# Patient Record
Sex: FEMALE | Race: WHITE | HISPANIC OR LATINO | Employment: FULL TIME | URBAN - METROPOLITAN AREA
[De-identification: names, ages, dates, MRNs, and addresses within clinical notes are randomized per-mention and may not be internally consistent; named-entity substitution may affect disease eponyms.]

---

## 2017-01-11 ENCOUNTER — ALLSCRIPTS OFFICE VISIT (OUTPATIENT)
Dept: OTHER | Facility: OTHER | Age: 57
End: 2017-01-11

## 2017-01-11 DIAGNOSIS — R10.9 ABDOMINAL PAIN: ICD-10-CM

## 2017-01-13 ENCOUNTER — TRANSCRIBE ORDERS (OUTPATIENT)
Dept: ADMINISTRATIVE | Facility: HOSPITAL | Age: 57
End: 2017-01-13

## 2017-01-13 DIAGNOSIS — R10.9 RIGHT FLANK PAIN: Primary | ICD-10-CM

## 2017-01-21 ENCOUNTER — ALLSCRIPTS OFFICE VISIT (OUTPATIENT)
Dept: OTHER | Facility: OTHER | Age: 57
End: 2017-01-21

## 2017-02-07 ENCOUNTER — HOSPITAL ENCOUNTER (OUTPATIENT)
Dept: CT IMAGING | Facility: HOSPITAL | Age: 57
Discharge: HOME | End: 2017-02-07
Payer: COMMERCIAL

## 2017-02-07 DIAGNOSIS — R10.9 ABDOMINAL PAIN: ICD-10-CM

## 2017-02-07 PROCEDURE — 74176 CT ABD & PELVIS W/O CONTRAST: CPT

## 2017-02-09 ENCOUNTER — GENERIC CONVERSION - ENCOUNTER (OUTPATIENT)
Dept: OTHER | Facility: OTHER | Age: 57
End: 2017-02-09

## 2017-04-03 ENCOUNTER — ALLSCRIPTS OFFICE VISIT (OUTPATIENT)
Dept: OTHER | Facility: OTHER | Age: 57
End: 2017-04-03

## 2017-05-16 ENCOUNTER — ALLSCRIPTS OFFICE VISIT (OUTPATIENT)
Dept: OTHER | Facility: OTHER | Age: 57
End: 2017-05-16

## 2017-05-16 DIAGNOSIS — E55.9 VITAMIN D DEFICIENCY: ICD-10-CM

## 2017-05-16 DIAGNOSIS — E03.9 HYPOTHYROIDISM: ICD-10-CM

## 2017-05-16 DIAGNOSIS — E78.5 HYPERLIPIDEMIA: ICD-10-CM

## 2017-05-16 DIAGNOSIS — F41.1 GENERALIZED ANXIETY DISORDER: ICD-10-CM

## 2017-05-16 DIAGNOSIS — Z12.31 ENCOUNTER FOR SCREENING MAMMOGRAM FOR MALIGNANT NEOPLASM OF BREAST: ICD-10-CM

## 2017-06-22 ENCOUNTER — APPOINTMENT (OUTPATIENT)
Dept: LAB | Facility: CLINIC | Age: 57
End: 2017-06-22
Payer: COMMERCIAL

## 2017-06-22 ENCOUNTER — TRANSCRIBE ORDERS (OUTPATIENT)
Dept: LAB | Facility: CLINIC | Age: 57
End: 2017-06-22

## 2017-06-22 DIAGNOSIS — E03.9 HYPOTHYROIDISM: ICD-10-CM

## 2017-06-22 DIAGNOSIS — E55.9 VITAMIN D DEFICIENCY: ICD-10-CM

## 2017-06-22 DIAGNOSIS — E78.5 HYPERLIPIDEMIA: ICD-10-CM

## 2017-06-22 DIAGNOSIS — F41.1 GENERALIZED ANXIETY DISORDER: ICD-10-CM

## 2017-06-22 LAB
25(OH)D3 SERPL-MCNC: 21.2 NG/ML (ref 30–100)
ALBUMIN SERPL BCP-MCNC: 3.8 G/DL (ref 3.5–5)
ALP SERPL-CCNC: 58 U/L (ref 46–116)
ALT SERPL W P-5'-P-CCNC: 37 U/L (ref 12–78)
ANION GAP SERPL CALCULATED.3IONS-SCNC: 5 MMOL/L (ref 4–13)
AST SERPL W P-5'-P-CCNC: 21 U/L (ref 5–45)
BASOPHILS # BLD AUTO: 0 THOUSANDS/ΜL (ref 0–0.1)
BASOPHILS NFR BLD AUTO: 0 % (ref 0–1)
BILIRUB SERPL-MCNC: 0.44 MG/DL (ref 0.2–1)
BUN SERPL-MCNC: 31 MG/DL (ref 5–25)
CALCIUM SERPL-MCNC: 8.8 MG/DL (ref 8.3–10.1)
CHLORIDE SERPL-SCNC: 108 MMOL/L (ref 100–108)
CHOLEST SERPL-MCNC: 228 MG/DL (ref 50–200)
CO2 SERPL-SCNC: 27 MMOL/L (ref 21–32)
CREAT SERPL-MCNC: 0.66 MG/DL (ref 0.6–1.3)
EOSINOPHIL # BLD AUTO: 0.06 THOUSAND/ΜL (ref 0–0.61)
EOSINOPHIL NFR BLD AUTO: 1 % (ref 0–6)
ERYTHROCYTE [DISTWIDTH] IN BLOOD BY AUTOMATED COUNT: 13.4 % (ref 11.6–15.1)
GFR SERPL CREATININE-BSD FRML MDRD: >60 ML/MIN/1.73SQ M
GLUCOSE P FAST SERPL-MCNC: 89 MG/DL (ref 65–99)
HCT VFR BLD AUTO: 40 % (ref 34.8–46.1)
HDLC SERPL-MCNC: 50 MG/DL (ref 40–60)
HGB BLD-MCNC: 12.9 G/DL (ref 11.5–15.4)
LDLC SERPL CALC-MCNC: 159 MG/DL (ref 0–100)
LYMPHOCYTES # BLD AUTO: 2.1 THOUSANDS/ΜL (ref 0.6–4.47)
LYMPHOCYTES NFR BLD AUTO: 48 % (ref 14–44)
MCH RBC QN AUTO: 29 PG (ref 26.8–34.3)
MCHC RBC AUTO-ENTMCNC: 32.3 G/DL (ref 31.4–37.4)
MCV RBC AUTO: 90 FL (ref 82–98)
MONOCYTES # BLD AUTO: 0.38 THOUSAND/ΜL (ref 0.17–1.22)
MONOCYTES NFR BLD AUTO: 9 % (ref 4–12)
NEUTROPHILS # BLD AUTO: 1.81 THOUSANDS/ΜL (ref 1.85–7.62)
NEUTS SEG NFR BLD AUTO: 42 % (ref 43–75)
NRBC BLD AUTO-RTO: 0 /100 WBCS
PLATELET # BLD AUTO: 270 THOUSANDS/UL (ref 149–390)
PMV BLD AUTO: 9.2 FL (ref 8.9–12.7)
POTASSIUM SERPL-SCNC: 4.3 MMOL/L (ref 3.5–5.3)
PROT SERPL-MCNC: 6.9 G/DL (ref 6.4–8.2)
RBC # BLD AUTO: 4.45 MILLION/UL (ref 3.81–5.12)
SODIUM SERPL-SCNC: 140 MMOL/L (ref 136–145)
TRIGL SERPL-MCNC: 95 MG/DL
TSH SERPL DL<=0.05 MIU/L-ACNC: 2.32 UIU/ML (ref 0.36–3.74)
WBC # BLD AUTO: 4.36 THOUSAND/UL (ref 4.31–10.16)

## 2017-06-22 PROCEDURE — 85025 COMPLETE CBC W/AUTO DIFF WBC: CPT

## 2017-06-22 PROCEDURE — 80061 LIPID PANEL: CPT

## 2017-06-22 PROCEDURE — 36415 COLL VENOUS BLD VENIPUNCTURE: CPT

## 2017-06-22 PROCEDURE — 80053 COMPREHEN METABOLIC PANEL: CPT

## 2017-06-22 PROCEDURE — 84443 ASSAY THYROID STIM HORMONE: CPT

## 2017-06-22 PROCEDURE — 82306 VITAMIN D 25 HYDROXY: CPT

## 2017-06-23 ENCOUNTER — GENERIC CONVERSION - ENCOUNTER (OUTPATIENT)
Dept: OTHER | Facility: OTHER | Age: 57
End: 2017-06-23

## 2017-07-20 ENCOUNTER — GENERIC CONVERSION - ENCOUNTER (OUTPATIENT)
Dept: OTHER | Facility: OTHER | Age: 57
End: 2017-07-20

## 2017-08-15 ENCOUNTER — ALLSCRIPTS OFFICE VISIT (OUTPATIENT)
Dept: OTHER | Facility: OTHER | Age: 57
End: 2017-08-15

## 2018-01-10 NOTE — RESULT NOTES
Message   Recorded as Task   Date: 06/23/2017 08:58 AM, Created By: Radames Real   Task Name: Call Patient with results   Assigned To: Liatbernarda Cam   Regarding Patient: Valeriano Severino, Status: In Progress   Comment:    Jose Nazario - 23 Jun 2017 8:58 AM     Patient Phone: [phone]    cholesterol is a bit elevated and vitamin D level is low  Would recommend 5000 units of vitamin D3 over-the-counter daily  Encourage diet and exercise for cholesterol  Alex Quintana - 26 Jun 2017 10:34 AM     TASK IN PROGRESS   Mary Dias - 26 Jun 2017 10:34 AM     TASK EDITED  [facility] for patient to call for BW results  Kathy Franks - 28 Jun 2017 8:55 AM     TASK EDITED  lmom to call for results  Kathy Franks - 05 Jul 2017 12:56 PM     TASK EDITED  lmom to call for results  Kathy Franks - 11 Jul 2017 7:18 AM     TASK EDITED  pt has not responded to messages  Please send letter  Grisel Lima - 20 Jul 2017 10:28 AM     TASK EDITED  pt notified of results and recommendations          Signatures   Electronically signed by : Imtiaz Gerber, ; Jul 20 2017 10:29AM EST                       (Author)

## 2018-01-11 ENCOUNTER — ALLSCRIPTS OFFICE VISIT (OUTPATIENT)
Dept: OTHER | Facility: OTHER | Age: 58
End: 2018-01-11

## 2018-01-12 VITALS
BODY MASS INDEX: 19.93 KG/M2 | HEART RATE: 80 BPM | SYSTOLIC BLOOD PRESSURE: 142 MMHG | HEIGHT: 63 IN | DIASTOLIC BLOOD PRESSURE: 82 MMHG | WEIGHT: 112.5 LBS | RESPIRATION RATE: 16 BRPM

## 2018-01-12 NOTE — PROGRESS NOTES
Assessment   1  Sinusitis (473 9) (J32 9)    Plan   Sinusitis    · Azithromycin 250 MG Oral Tablet; TAKE 2 TABLETS ON DAY 1 THEN TAKE 1    TABLET A DAY FOR 4 DAYS    Discussion/Summary      #1  Azithromycin 250 mg -2 tablets today followed by 1 tablet daily for the next 4 days  She is to use Coricidin HBP/cold and flu 1 tablet 3 times a day and Delsym 2 tsp twice a day for her cough  to get over her infection  if not better  Possible side effects of new medications were reviewed with the patient/guardian today  The treatment plan was reviewed with the patient/guardian  The patient/guardian understands and agrees with the treatment plan       Self Referrals: No      Chief Complaint   Congestion, loss of voice, painful sore throat that started Paul night  Body aches, ears bothersome, HA, fatigue - lsh      History of Present Illness   HPI: This is a 51-year-old female who comes in with head congestion, postnasal drip and a sore throat  She also has a hoarse voice which has been going on for the past 5 days  She is also complaining of body aches and pressure in her ears  She denies any nausea, vomiting or diarrhea and she has used over-the-counter Dristan with no relief  Her blood pressure is 104/72 and her temperature is 98 3Â°  Review of Systems        Constitutional: feeling poorly, but-- as noted in HPI,-- no fever,-- no chills-- and-- not feeling tired  ENT: hoarseness-- and-- Head congestion and postnasal drip, but-- as noted in HPI,-- no nosebleeds,-- no hearing loss-- and-- no nasal discharge--       The patient presents with complaints of sudden onset of moderate bilateral earache, described as dull and aching, non-radiating  The patient presents with complaints of sudden onset of moderate bilateral sore throat, described as sharp, non-radiating  Cardiovascular: no complaints of slow or fast heart rate, no chest pain, no palpitations, no leg claudication or lower extremity edema  Respiratory: as noted in HPI,-- no shortness of breath,-- no orthopnea,-- no wheezing,-- no shortness of breath during exertion-- and-- no PND--       The patient presents with complaints of gradual onset of intermittent episodes of mild cough, described as non-productive  Gastrointestinal: no complaints of abdominal pain, no constipation, no nausea or diarrhea, no vomiting, no bloody stools  ROS reviewed  Active Problems   1  Allergic dermatitis (692 9) (L23 9)   2  Allergic rhinitis (477 9) (J30 9)   3  Anxiety (300 00) (F41 9)   4  Burning sensation of feet (782 0) (R20 8)   5  Chest discomfort (786 59) (R07 89)   6  Depression with anxiety (300 4) (F41 8)   7  Ear pain (388 70) (H92 09)   8  Eustachian tube dysfunction (381 81) (H69 80)   9  CIERA (generalized anxiety disorder) (300 02) (F41 1)   10  Grief reaction (309 0) (F43 20)   11  Headache (784 0) (R51)   12  Headache, migraine (346 90) (G43 909)   13  Hiatal hernia (553 3) (K44 9)   14  Denied: History of mental disorder   15  Hot Flashes   16  Hyperlipemia (272 4) (E78 5)   17  Hypothyroidism (244 9) (E03 9)   18  Left knee pain (719 46) (M25 562)   19  Neck pain (723 1) (M54 2)   20  Osteoarthritis (715 90) (M19 90)   21  Otitis media (382 9) (H66 90)   22  Pain of upper extremity (729 5) (M79 603)   23  Painful urination (788 1) (R30 9)   24  Patellar tendonitis of left knee (726 64) (M76 52)   25  Purpura (286 6)   26  Right flank pain (789 09) (R10 9)   27  Right shoulder pain (719 41) (M25 511)   28  Sinusitis (473 9) (J32 9)   29  Urinary tract infection, bacterial (599 0,041 9) (N39 0,A49 9)   30  Visit for screening mammogram (V76 12) (Z12 31)   31  Vitamin D deficiency (268 9) (E55 9)    Past Medical History   1  History of Acute nonsuppurative otitis media, unspecified laterality (381 00) (H65 199)   2  History of Acute otitis media, unspecified laterality   3  History of Acute Sphenoidal Sinusitis (461 3)   4   History of Grief reaction (309 0) (F43 20)   5  History of Hand pain, unspecified laterality   6  History of abdominal pain (V13 89) (Z87 898)   7  History of acute sinusitis (V12 69) (Z87 09)   8  History of chest pain (V13 89) (Z87 898)   9  History of chronic sinusitis (V12 69) (Z87 09)   10  History of fever (V13 89) (Z87 898)   11  History of headache (V13 89) (Z87 898)   12  Denied: History of mental disorder   13  History of nausea (V12 79) (Z87 898)   14  History of scabies (V12 09) (Z86 19)   15  History of sciatica (V12 49) (Z86 69)   16  History of sinusitis (V12 69) (Z87 09)   17  History of sinusitis (V12 69) (Z87 09)   18  History of urinary frequency (V13 09) (Z87 898)   19  History of Left hip pain (719 45) (M25 552)   20  History of Left knee pain (719 46) (M25 562)   21  History of Muscle ache (729 1) (M79 1)   22  History of Otalgia, unspecified laterality (388 70) (H92 09)   23  History of Pain of lower leg, unspecified laterality (729 5) (M79 669)   24  Sinusitis (473 9) (J32 9)   25  History of Soft Tissue Pain In Lower Extremities (729 5)   26  History of URI, acute (465 9) (J06 9)   27  History of Urinary Tract Infection (V13 02)  Active Problems And Past Medical History Reviewed: The active problems and past medical history were reviewed and updated today  Family History   Mother    1  Family history of Coronary Artery Disease (V17 49)   2  Family history of Diabetes Mellitus (V18 0)  Father    3  Family history of Cancer   4  Family history of Family Health Status Of Father -   Family History    5  No family history of mental disorder  Family History Reviewed: The family history was reviewed and updated today  Social History    · Being A Social Drinker   · Employed   · Native Language Citizen of [place]   · Never a smoker   · Occupation:   · Teacher   · Secondhand smoke exposure (V15 89) (Z77 22)  The social history was reviewed and updated today   The social history was reviewed and is unchanged  Surgical History   1  History of  Section  Surgical History Reviewed: The surgical history was reviewed and updated today  Current Meds    1  BuPROPion HCl ER (XL) 300 MG Oral Tablet Extended Release 24 Hour; Therapy: 50CZB4785 to Recorded   2  Fish Oil 1000 MG Oral Capsule Recorded   3  Levothyroxine Sodium 25 MCG Oral Tablet; take 1 tablet by mouth daily; Therapy: 38JOP6947 to (Dina Meade)  Requested for: 83URQ9428; Last     Rx:[id number] Ordered   4  Vitamin B-12 1000 MCG Oral Tablet; TAKE 1 TABLET DAILY AS DIRECTED; Therapy: 48IYL8218 to (Evaluate:48Qqn5154); Last Rx:2015 Ordered   5  Vitamin D 2000 UNIT Oral Capsule; take 1 capsule daily; Therapy: 23JFT3099 to (Last Rx:2015) Ordered     The medication list was reviewed and updated today  Allergies   1  Ceftin TABS   2  Ciprofloxacin HCl TABS   3  Fiorinal CAPS   4  Lyrica CAPS    Vitals    Recorded: 27WDV8243 10:43AM   Temperature 98 3 F, Oral   Heart Rate 76, L Radial   Pulse Quality Regular, L Radial   Systolic 294, LUE, Sitting   Diastolic 72, LUE, Sitting   Height 5 ft 3 in   Weight 117 lb    BMI Calculated 20 73   BSA Calculated 1 54     Physical Exam        Constitutional      General appearance: No acute distress, well appearing and well nourished  Ears, Nose, Mouth, and Throat      External inspection of ears and nose: Normal        Otoscopic examination: Abnormal  -- Tympanic membranes dull bilaterally without injection or erythema  Oropharynx: Abnormal  -- Positive postnasal drip, +2 erythema of posterior pharynx without exudates  Pulmonary      Auscultation of lungs: Clear to auscultation  Cardiovascular      Auscultation of heart: Normal rate and rhythm, normal S1 and S2, without murmurs  Examination of extremities for edema and/or varicosities: Normal        Lymphatic      Palpation of lymph nodes in neck: No lymphadenopathy         Psychiatric Orientation to person, place, and time: Normal        Mood and affect: Normal           Signatures    Electronically signed by : GENEVIEVE Murphy; Jan 11 2018 11:01AM EST                       (Author)     Electronically signed by : Mikey Shelton MD; Jan 11 2018 11:47AM EST                       (Author)

## 2018-01-12 NOTE — MISCELLANEOUS
Message  Return to work or school:   Teofilo Cheng is under my professional care  She was seen in my office on 05/16/2017   She is able to return to work on  05/17/2017      Patient was seen in the office today for her annual physical  please excuse absence for 05/16/2017  if you have any questions please feel free to call the office  Noemi Kwon PA-C        Signatures   Electronically signed by : Michelle Reilly, ; May 16 2017  3:33PM EST                       (Author)

## 2018-01-13 VITALS
BODY MASS INDEX: 20.55 KG/M2 | WEIGHT: 116 LBS | SYSTOLIC BLOOD PRESSURE: 104 MMHG | HEIGHT: 63 IN | HEART RATE: 76 BPM | DIASTOLIC BLOOD PRESSURE: 78 MMHG

## 2018-01-13 VITALS
HEIGHT: 63 IN | TEMPERATURE: 97.2 F | DIASTOLIC BLOOD PRESSURE: 82 MMHG | SYSTOLIC BLOOD PRESSURE: 108 MMHG | WEIGHT: 112.5 LBS | RESPIRATION RATE: 16 BRPM | HEART RATE: 80 BPM | BODY MASS INDEX: 19.93 KG/M2

## 2018-01-13 VITALS — TEMPERATURE: 97.9 F

## 2018-01-13 NOTE — RESULT NOTES
Verified Results  (1) COMPREHENSIVE METABOLIC PANEL 35QJE8257 51:21TM Pedro Sosa Order Number: [id number]     Test Name Result Flag Reference   GLUCOSE,RANDM 95 mg/dL     If the patient is fasting, the ADA then defines impaired fasting glucose as > 100 mg/dL and diabetes as > or equal to 123 mg/dL  SODIUM 141 mmol/L  136-145   POTASSIUM 4 0 mmol/L  3 5-5 3   CHLORIDE 107 mmol/L  100-108   CARBON DIOXIDE 27 mmol/L  21-32   ANION GAP (CALC) 7 mmol/L  4-13   BLOOD UREA NITROGEN 28 mg/dL H 5-25   CREATININE 0 74 mg/dL  0 60-1 30   Standardized to IDMS reference method   CALCIUM 8 6 mg/dL  8 3-10 1   BILI, TOTAL 0 50 mg/dL  0 20-1 00   ALK PHOSPHATAS 56 U/L     ALT (SGPT) 52 U/L  12-78   AST(SGOT) 25 U/L  5-45   ALBUMIN 3 9 g/dL  3 5-5 0   TOTAL PROTEIN 6 9 g/dL  6 4-8 2   eGFR Non-African American      >60 0 ml/min/1 73sq m   - Patient Instructions: This is a fasting blood test  Water, black tea or black coffee only after 9:00pm the night before test Drink 2 glasses of water the morning of test   National Kidney Disease Education Program recommendations are as follows:  GFR calculation is accurate only with a steady state creatinine  Chronic Kidney disease less than 60 ml/min/1 73 sq  meters  Kidney failure less than 15 ml/min/1 73 sq  meters  (1) CBC/PLT/DIFF 44Foq5345 08:29AM Pedro Sosa Order Number: [id number]     Test Name Result Flag Reference   WBC COUNT 4 29 Thousand/uL L 4 31-10 16   RBC COUNT 4 51 Million/uL  3 81-5 12   HEMOGLOBIN 13 1 g/dL  11 5-15 4   HEMATOCRIT 39 9 %  34 8-46  1   MCV 89 fL  82-98   MCH 29 0 pg  26 8-34 3   MCHC 32 8 g/dL  31 4-37 4   RDW 13 4 %  11 6-15 1   MPV 9 3 fL  8 9-12 7   PLATELET COUNT 306 Thousands/uL  149-390   nRBC AUTOMATED 0 /100 WBCs     NEUTROPHILS RELATIVE PERCENT 43 %  43-75   LYMPHOCYTES RELATIVE PERCENT 46 % H 14-44   MONOCYTES RELATIVE PERCENT 8 %  4-12   EOSINOPHILS RELATIVE PERCENT 3 %  0-6   BASOPHILS RELATIVE PERCENT 0 %  0-1   NEUTROPHILS ABSOLUTE COUNT 1 82 Thousands/?L L 1 85-7 62   LYMPHOCYTES ABSOLUTE COUNT 1 99 Thousands/?L  0 60-4 47   MONOCYTES ABSOLUTE COUNT 0 34 Thousand/?L  0 17-1 22   EOSINOPHILS ABSOLUTE COUNT 0 12 Thousand/?L  0 00-0 61   BASOPHILS ABSOLUTE COUNT 0 01 Thousands/?L  0 00-0 10   - Patient Instructions: This bloodwork is non-fasting  Please drink two glasses of water morning of bloodwork  - Patient Instructions: This bloodwork is non-fasting  Please drink two glasses of water morning of bloodwork  (1) LIPID PANEL FASTING W DIRECT LDL REFLEX 84Osu1748 08:29AM Latjeanmariee Puls Order Number: [id number]     Test Name Result Flag Reference   CHOLESTEROL 234 mg/dL H    LDL CHOLESTEROL CALCULATED 167 mg/dL H 0-100   - Patient Instructions: This is a fasting blood test  Water, black tea or black coffee only after 9:00pm the night before test   Drink 2 glasses of water the morning of test     - Patient Instructions: This is a fasting blood test  Water, black tea or black coffee only after 9:00pm the night before test Drink 2 glasses of water the morning of test   Triglyceride:         Normal              <150 mg/dl       Borderline High    150-199 mg/dl       High               200-499 mg/dl       Very High          >499 mg/dl  Cholesterol:         Desirable        <200 mg/dl      Borderline High  200-239 mg/dl      High             >239 mg/dl  HDL Cholesterol:        High    >59 mg/dL      Low     <41 mg/dL  LDL Cholesterol:        Optimal          <100 mg/dl        Near Optimal     100-129 mg/dl        Above Optimal          Borderline High   130-159 mg/dl          High              160-189 mg/dl          Very High        >189 mg/dl  LDL CALCULATED:    This screening LDL is a calculated result  It does not have the accuracy of the Direct Measured LDL in the monitoring of patients with hyperlipidemia and/or statin therapy     Direct Measure LDL (GLC651) must be ordered separately in these patients  TRIGLYCERIDES 136 mg/dL  <=150   Specimen collection should occur prior to N-Acetylcysteine or Metamizole administration due to the potential for falsely depressed results  HDL,DIRECT 40 mg/dL  40-60   Specimen collection should occur prior to Metamizole administration due to the potential for falsely depressed results  (1) TSH WITH FT4 REFLEX 09Aug2016 08:29AM Latrelle Puls Order Number: [id number]     Test Name Result Flag Reference   TSH 3 770 uIU/mL H 0 358-3 740   - Patient Instructions: This is a fasting blood test  Water, black tea or black coffee only after 9:00pm the night before test Drink 2 glasses of water the morning of test   Patients undergoing fluorescein dye angiography may retain small amounts of fluorescein in the body for 48-72 hours post procedure  Samples containing fluorescein can produce falsely depressed TSH values  If the patient had this procedure,a specimen should be resubmitted post fluorescein clearance  The recommended reference ranges for TSH during pregnancy are as follows:  First trimester 0 1 to 2 5 uIU/mL  Second trimester  0 2 to 3 0 uIU/mL  Third trimester 0 3 to 3 0 uIU/m   T4,FREE 0 83 ng/dL  0 76-1 46   - Patient Instructions: This is a fasting blood test  Water, black tea or black coffee only after 9:00pm the night before test Drink 2 glasses of water the morning of test      (1) VITAMIN D 25-HYDROXY 09Aug2016 08:29AM Latrelle Puls Order Number: [id number]     Test Name Result Flag Reference   VIT D 25-HYDROX 33 8 ng/mL  30 0-100 0   This assay is a certified procedure of the CDC Vitamin D Standardization Certification Program (VDSCP)     Deficiency <20ng/ml   Insufficiency 20-30ng/ml   Sufficient  ng/ml     *Patients undergoing fluorescein dye angiography may retain small amounts of fluorescein in the body for 48-72 hours post procedure   Samples containing fluorescein can produce falsely elevated Vitamin D values  If the patient had this procedure, a specimen should be resubmitted post fluorescein clearance

## 2018-01-14 VITALS
WEIGHT: 113.25 LBS | HEART RATE: 76 BPM | SYSTOLIC BLOOD PRESSURE: 122 MMHG | BODY MASS INDEX: 20.07 KG/M2 | HEIGHT: 63 IN | DIASTOLIC BLOOD PRESSURE: 70 MMHG

## 2018-01-14 NOTE — PROGRESS NOTES
Assessment    1  Encounter for preventive health examination (V70 0) (Z00 00)   2  Hypothyroidism (244 9) (E03 9)   3  Hyperlipemia (272 4) (E78 5)   4  Vitamin D deficiency (268 9) (E55 9)   5  CIERA (generalized anxiety disorder) (300 02) (F41 1)   6  Never a smoker    Plan  CIERA (generalized anxiety disorder), Hyperlipemia, Hypothyroidism, Vitamin D deficiency    · (1) CBC/PLT/DIFF; Status:Active; Requested for:16May2017;    · (1) COMPREHENSIVE METABOLIC PANEL; Status:Active; Requested for:16May2017;    · (1) LIPID PANEL FASTING W DIRECT LDL REFLEX; Status:Active; Requested  for:16May2017;    · (1) TSH WITH FT4 REFLEX; Status:Active; Requested for:16May2017;    · (1) VITAMIN D 25-HYDROXY; Status:Active; Requested for:16May2017;   Visit for screening mammogram    · MAMMO SCREENING BILATERAL W 3D & CAD; Status:Active - Retrospective By Protocol  Authorization; Requested for:16May2017;     Discussion/Summary    1  Health maintenance-recommend assessing baseline blood work  Patient is physically stable for travel currently overseas  No acute concerns  2  Hypothyroidism-stable on levothyroxine 25 Âµg daily  Will reassess TSH level with labs  3  Hyperlipidemia-status unknown  Will reassess labs  4  Vitamin D deficiency-stable on 2000 units vitamin D over-the-counter daily  5  Generalized anxiety disorder-patient seems to be stable at this point  No medication changes  Follow-up with lab results as necessary  Chief Complaint  Physical for a class she is taking overseas  mjs      History of Present Illness  HPI: This is a 80-year-old female that presents to the office for health maintenance physical  She is planning on traveling [place] for three-week course and pain  She is currently getting her masters completed in [employer] [facility] [employer]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  She has been feeling well without any acute complaints  She continues to grieve the untimely loss of her  in the past year   It has been almost a year since she has had routine blood work completed  Review of Systems    Constitutional: no fever, not feeling poorly, no chills and not feeling tired  Eyes: no eyesight problems and no purulent discharge from the eyes  ENT: no nosebleeds and no nasal discharge  Cardiovascular: no chest pain and no palpitations  Respiratory: no shortness of breath, no cough, no wheezing and no shortness of breath during exertion  Gastrointestinal: no abdominal pain, no nausea, no constipation and no diarrhea  Musculoskeletal: no arthralgias, no joint swelling and no myalgias  Integumentary: no rashes  Neurological: no headache  Hematologic/Lymphatic: no swollen glands and no swollen glands in the neck  Active Problems    1  Allergic dermatitis (692 9) (L23 9)   2  Allergic rhinitis (477 9) (J30 9)   3  Anxiety (300 00) (F41 9)   4  Burning sensation of feet (782 0) (R20 8)   5  Chest discomfort (786 59) (R07 89)   6  Depression with anxiety (300 4) (F41 8)   7  Ear pain (388 70) (H92 09)   8  Eustachian tube dysfunction (381 81) (H69 80)   9  CIERA (generalized anxiety disorder) (300 02) (F41 1)   10  Grief reaction (309 0) (F43 20)   11  Headache (784 0) (R51)   12  Hiatal hernia (553 3) (K44 9)   13  Denied: History of mental disorder   14  Hot Flashes   15  Hyperlipemia (272 4) (E78 5)   16  Hypothyroidism (244 9) (E03 9)   17  Left knee pain (719 46) (M25 562)   18  Migraine headache (346 90) (G43 909)   19  Neck pain (723 1) (M54 2)   20  Osteoarthritis (715 90) (M19 90)   21  Otitis media (382 9) (H66 90)   22  Pain of upper extremity (729 5) (M79 603)   23  Painful urination (788 1) (R30 9)   24  Patellar tendonitis of left knee (726 64) (M76 52)   25  Purpura (286 6)   26  Right flank pain (789 09) (R10 9)   27  Right shoulder pain (719 41) (M25 511)   28  Urinary tract infection, bacterial (599 0,041 9) (N39 0,A49 9)   29   Vitamin D deficiency (268 9) (E55 9)    Past Medical History    · History of Acute nonsuppurative otitis media, unspecified laterality (381 00) (H65 199)   · History of Acute otitis media, unspecified laterality   · History of Acute Sphenoidal Sinusitis (461 3)   · History of Grief reaction (309 0) (F43 20)   · History of Hand pain, unspecified laterality   · History of abdominal pain (V13 89) (P41 026)   · History of acute sinusitis (V12 69) (Z87 09)   · History of chest pain (V13 89) (U12 536)   · History of chronic sinusitis (V12 69) (Z87 09)   · History of fever (V13 89) (R61 593)   · History of headache (V13 89) (D63 042)   · Denied: History of mental disorder   · History of nausea (V12 79) (Z26 753)   · History of scabies (V12 09) (Z86 19)   · History of sciatica (V12 49) (Z86 69)   · History of sinusitis (V12 69) (Z87 09)   · History of sinusitis (V12 69) (Z87 09)   · History of urinary frequency (V13 09) (F13 206)   · History of Left hip pain (719 45) (M25 552)   · History of Left knee pain (719 46) (M25 562)   · History of Muscle ache (729 1) (M79 1)   · History of Otalgia, unspecified laterality (388 70) (H92 09)   · History of Pain of lower leg, unspecified laterality (729 5) (M79 669)   · Sinusitis (473 9) (J32 9)   · History of Soft Tissue Pain In Lower Extremities (729 5)   · History of URI, acute (465 9) (J06 9)   · History of Urinary Tract Infection (V13 02)    Surgical History    · History of  Section    Family History  Mother    · Family history of Coronary Artery Disease (V17 49)   · Family history of Diabetes Mellitus (V18 0)  Father    · Family history of Cancer   · Family history of Family Health Status Of Father -   Family History    · No family history of mental disorder    Social History    · Being A Social Drinker   · Employed   · Marital History - Currently    · Native Language Kosovan   · Never a smoker   · Occupation:   · Teacher   · Secondhand smoke exposure (V1 89) (Z77 22)    Current Meds   1   Fish Oil 1000 MG Oral Capsule Recorded   2  Levothyroxine Sodium 25 MCG Oral Tablet; TAKE 1 TABLET BY MOUTH ONCE DAILY; Therapy: 37AVV4850 to (Evaluate:24Nov2016)  Requested for: 16Ghr5628; Last   Rx:10Kkv5628 Ordered   3  Vitamin B-12 1000 MCG Oral Tablet; TAKE 1 TABLET DAILY AS DIRECTED; Therapy: 16YNI6554 to (Evaluate:25Lei1278); Last Rx:18Mar2015 Ordered   4  Vitamin D 2000 UNIT Oral Capsule; take 1 capsule daily; Therapy: 53JFK6350 to (Last Rx:18Mar2015) Ordered   5  Wellbutrin  MG Oral Tablet Extended Release 24 Hour; Therapy: 00OUW1433 to (Last Rx:33Prp7039)  Requested for: 69IRR3671 Ordered    Allergies    1  Ceftin TABS   2  Ciprofloxacin HCl TABS   3  Fiorinal CAPS   4  Lyrica CAPS    Vitals   Recorded: 89KRC0193 03:19PM   Heart Rate 76   Systolic 460   Diastolic 70   Height 5 ft 3 in   Weight 113 lb 4 00 oz   BMI Calculated 20 06   BSA Calculated 1 52     Physical Exam    Constitutional   General appearance: No acute distress, well appearing and well nourished  Head and Face   Head and face: Normal     Palpation of the face and sinuses: No sinus tenderness  Eyes   Conjunctiva and lids: No swelling, erythema or discharge  Pupils and irises: Equal, round, reactive to light  Ophthalmoscopic examination: Normal fundi and optic discs  Ears, Nose, Mouth, and Throat   External inspection of ears and nose: Normal     Otoscopic examination: Tympanic membranes translucent with normal light reflex  Canals patent without erythema  Hearing: Normal     Nasal mucosa, septum, and turbinates: Normal without edema or erythema  Lips, teeth, and gums: Normal, good dentition  Oropharynx: Normal with no erythema, edema, exudate or lesions  Neck   Neck: Supple, symmetric, trachea midline, no masses  Thyroid: Normal, no thyromegaly  Pulmonary   Percussion of chest: Normal     Palpation of chest: Normal     Cardiovascular   Palpation of heart: Normal PMI, no thrills      Auscultation of heart: Normal rate and rhythm, normal S1 and S2, no murmurs  Peripheral vascular exam: Normal     Examination of extremities for edema and/or varicosities: Normal     Abdomen   Abdomen: Non-tender, no masses  Liver and spleen: No hepatomegaly or splenomegaly  Anus, perineum, and rectum: Normal sphincter tone, no masses, no prolapse  Stool sample for occult blood: Negative  Genitourinary   External genitalia and vagina: Normal, no lesions appreciated  Urethra: Normal, no discharge  Bladder: Not distended, no tenderness  Cervix: Normal, no lesions  Uterus: Normal size, no tenderness, no masses  Adnexa/Parametria: Normal, no masses or tenderness  Lymphatic   Palpation of lymph nodes in axillae: No lymphadenopathy  Palpation of lymph nodes in groin: No lymphadenopathy  Musculoskeletal   Gait and station: Normal     Digits and nails: Normal without clubbing or cyanosis  Joints, bones, and muscles: Normal     Range of motion: Normal     Stability: Normal     Muscle strength/tone: Normal     Skin   Skin and subcutaneous tissue: Normal without rashes or lesions  Neurologic   Cortical function: Normal mental status  Reflexes: 2+ and symmetric  Psychiatric   Judgment and insight: Normal     Orientation to person, place, and time: Normal     Recent and remote memory: Intact      Mood and affect: Normal        Signatures   Electronically signed by : Guillermo Mazariegos [facility]; May 16 2017  3:35PM EST                       (Author)    Electronically signed by : Althea Walker MD; May 16 2017  7:36PM EST                       (Author)

## 2018-01-16 NOTE — RESULT NOTES
Verified Results  CT RENAL STONE STUDY ABDOMEN PELVIS WO CONTRAST [id number] 06:05PM Abbey Damon Order Number: [id number]    - Patient Instructions: To schedule this appointment, please contact Central Scheduling at [phone]  Test Name Result Flag Reference   CT RENAL STONE STUDY ABDOMEN PELVIS WO CONTRAST (Report)     CT ABDOMEN AND PELVIS WITHOUT IV CONTRAST - LOW DOSE RENAL STONE      INDICATION: Right flank pain      COMPARISON: None  TECHNIQUE: Low dose thin section CT examination of the abdomen and pelvis was performed without intravenous or oral contrast according to a protocol specifically designed to evaluate for urinary tract calculus  Axial, sagittal and coronal reformatted    images were submitted for interpretation  This examination, like all CT scans performed in the [facility], was performed utilizing techniques to minimize radiation dose exposure, including the use of iterative reconstruction and    automated exposure control  Evaluation for pathology in the abdomen and pelvis that is unrelated to urinary tract calculi is limited  FINDINGS:     RIGHT KIDNEY AND URETER:   No urinary tract calculi  No hydronephrosis or hydroureter  No perinephric collection  LEFT KIDNEY AND URETER:   No urinary tract calculi  No hydronephrosis or hydroureter  No perinephric collection  URINARY BLADDER:   Unremarkable  No significant abnormality in the visualized lung bases  Limited low radiation dose noncontrast CT evaluation demonstrates no clinically significant abnormality of liver, spleen, pancreas, or adrenal glands  No calcified gallstones or gallbladder wall thickening noted  No bowel obstruction  No ascites or lymphadenopathy  Bilateral pelvic clips are noted  There is a moderate amount of stool noted throughout the colon  Limited evaluation demonstrates no evidence to suggest acute appendicitis     No acute fracture or destructive osseous lesion is identified  IMPRESSION:     No hydronephrosis or intrarenal calculus  No acute intra-abdominal abnormality  No free air or free fluid          Workstation performed: [id number]     Signed by:   Barbara Soliz MD   2/9/17

## 2018-01-16 NOTE — RESULT NOTES
Verified Results  (1) CBC/PLT/DIFF [id number] 10:33AM Niecy Sosa Order Number: [id number]     Test Name Result Flag Reference   WBC COUNT 4 36 Thousand/uL  4 31-10 16   RBC COUNT 4 45 Million/uL  3 81-5 12   HEMOGLOBIN 12 9 g/dL  11 5-15 4   HEMATOCRIT 40 0 %  34 8-46  1   MCV 90 fL  82-98   MCH 29 0 pg  26 8-34 3   MCHC 32 3 g/dL  31 4-37 4   RDW 13 4 %  11 6-15 1   MPV 9 2 fL  8 9-12 7   PLATELET COUNT 272 Thousands/uL  149-390   nRBC AUTOMATED 0 /100 WBCs     NEUTROPHILS RELATIVE PERCENT 42 % L 43-75   LYMPHOCYTES RELATIVE PERCENT 48 % H 14-44   MONOCYTES RELATIVE PERCENT 9 %  4-12   EOSINOPHILS RELATIVE PERCENT 1 %  0-6   BASOPHILS RELATIVE PERCENT 0 %  0-1   NEUTROPHILS ABSOLUTE COUNT 1 81 Thousands/? ??L L 1 85-7 62   LYMPHOCYTES ABSOLUTE COUNT 2 10 Thousands/? ??L  0 60-4 47   MONOCYTES ABSOLUTE COUNT 0 38 Thousand/? ??L  0 17-1 22   EOSINOPHILS ABSOLUTE COUNT 0 06 Thousand/? ??L  0 00-0 61   BASOPHILS ABSOLUTE COUNT 0 00 Thousands/? ??L  0 00-0 10     (1) COMPREHENSIVE METABOLIC PANEL [id number] 12:86NU Niecy Catesdwell Order Number: [id number]     Test Name Result Flag Reference   SODIUM 140 mmol/L  136-145   POTASSIUM 4 3 mmol/L  3 5-5 3   CHLORIDE 108 mmol/L  100-108   CARBON DIOXIDE 27 mmol/L  21-32   ANION GAP (CALC) 5 mmol/L  4-13   BLOOD UREA NITROGEN 31 mg/dL H 5-25   CREATININE 0 66 mg/dL  0 60-1 30   Standardized to IDMS reference method   CALCIUM 8 8 mg/dL  8 3-10 1   BILI, TOTAL 0 44 mg/dL  0 20-1 00   ALK PHOSPHATAS 58 U/L     ALT (SGPT) 37 U/L  12-78   AST(SGOT) 21 U/L  5-45   ALBUMIN 3 8 g/dL  3 5-5 0   TOTAL PROTEIN 6 9 g/dL  6 4-8 2   eGFR Non-African American      >60 0 ml/min/1 73sq [facility] Disease Education Program recommendations are as follows:  GFR calculation is accurate only with a steady state creatinine  Chronic Kidney disease less than 60 ml/min/1 73 sq  meters  Kidney failure less than 15 ml/min/1 73 sq  meters     GLUCOSE FASTING 89 mg/dL  65-99     (1) LIPID PANEL FASTING W DIRECT LDL REFLEX 22Jun2017 10:33AM Nolan Minnie Order Number: [id number]     Test Name Result Flag Reference   CHOLESTEROL 228 mg/dL H    LDL CHOLESTEROL CALCULATED 159 mg/dL H 0-100   Triglyceride:         Normal              <150 mg/dl       Borderline High    150-199 mg/dl       High               200-499 mg/dl       Very High          >499 mg/dl  Cholesterol:         Desirable        <200 mg/dl      Borderline High  200-239 mg/dl      High             >239 mg/dl  HDL Cholesterol:        High    >59 mg/dL      Low     <41 mg/dL  LDL Cholesterol:        Optimal          <100 mg/dl        Near Optimal     100-129 mg/dl        Above Optimal          Borderline High   130-159 mg/dl          High              160-189 mg/dl          Very High        >189 mg/dl  LDL CALCULATED:    This screening LDL is a calculated result  It does not have the accuracy of the Direct Measured LDL in the monitoring of patients with hyperlipidemia and/or statin therapy  Direct Measure LDL (DVB809) must be ordered separately in these patients  TRIGLYCERIDES 95 mg/dL  <=150   Specimen collection should occur prior to N-Acetylcysteine or Metamizole administration due to the potential for falsely depressed results  HDL,DIRECT 50 mg/dL  40-60   Specimen collection should occur prior to Metamizole administration due to the potential for falsely depressed results  (1) TSH WITH FT4 REFLEX 22Jun2017 10:33AM Nolan Minnie Order Number: [id number]     Test Name Result Flag Reference   TSH 2 320 uIU/mL  0 358-3 740   Patients undergoing fluorescein dye angiography may retain small amounts of fluorescein in the body for 48-72 hours post procedure  Samples containing fluorescein can produce falsely depressed TSH values  If the patient had this procedure,a specimen should be resubmitted post fluorescein clearance            The recommended reference ranges for TSH during pregnancy are as follows:  First trimester 0 1 to 2 5 uIU/mL  Second trimester  0 2 to 3 0 uIU/mL  Third trimester 0 3 to 3 0 uIU/m     (1) VITAMIN D 25-HYDROXY [id number] 10:33AM Seema Barr Order Number: [id number]     Test Name Result Flag Reference   VIT D 25-HYDROX 21 2 ng/mL L 30 0-100 0   This assay is a certified procedure of the CDC Vitamin D Standardization Certification Program (VDSCP)     Deficiency <20ng/ml   Insufficiency 20-30ng/ml   Sufficient  ng/ml     *Patients undergoing fluorescein dye angiography may retain small amounts of fluorescein in the body for 48-72 hours post procedure  Samples containing fluorescein can produce falsely elevated Vitamin D values  If the patient had this procedure, a specimen should be resubmitted post fluorescein clearance

## 2018-01-23 VITALS
WEIGHT: 117 LBS | HEIGHT: 63 IN | SYSTOLIC BLOOD PRESSURE: 104 MMHG | DIASTOLIC BLOOD PRESSURE: 72 MMHG | HEART RATE: 76 BPM | BODY MASS INDEX: 20.73 KG/M2 | TEMPERATURE: 98.3 F

## 2018-03-15 DIAGNOSIS — E03.9 ACQUIRED HYPOTHYROIDISM: Primary | ICD-10-CM

## 2018-03-15 RX ORDER — LEVOTHYROXINE SODIUM 0.03 MG/1
TABLET ORAL
Qty: 90 TABLET | Refills: 0 | Status: SHIPPED | OUTPATIENT
Start: 2018-03-15 | End: 2018-06-10 | Stop reason: SDUPTHER

## 2018-06-10 DIAGNOSIS — E03.9 ACQUIRED HYPOTHYROIDISM: ICD-10-CM

## 2018-06-11 RX ORDER — LEVOTHYROXINE SODIUM 0.03 MG/1
TABLET ORAL
Qty: 90 TABLET | Refills: 0 | Status: SHIPPED | OUTPATIENT
Start: 2018-06-11 | End: 2018-09-14 | Stop reason: SDUPTHER

## 2018-07-02 ENCOUNTER — OFFICE VISIT (OUTPATIENT)
Dept: FAMILY MEDICINE CLINIC | Facility: CLINIC | Age: 58
End: 2018-07-02
Payer: COMMERCIAL

## 2018-07-02 VITALS
SYSTOLIC BLOOD PRESSURE: 110 MMHG | TEMPERATURE: 99.1 F | HEART RATE: 68 BPM | WEIGHT: 122 LBS | DIASTOLIC BLOOD PRESSURE: 62 MMHG | BODY MASS INDEX: 21.61 KG/M2

## 2018-07-02 DIAGNOSIS — E03.9 HYPOTHYROIDISM, UNSPECIFIED TYPE: ICD-10-CM

## 2018-07-02 DIAGNOSIS — Z12.11 SCREENING FOR COLON CANCER: ICD-10-CM

## 2018-07-02 DIAGNOSIS — E55.9 VITAMIN D DEFICIENCY: ICD-10-CM

## 2018-07-02 DIAGNOSIS — J30.9 ALLERGIC RHINITIS, UNSPECIFIED SEASONALITY, UNSPECIFIED TRIGGER: ICD-10-CM

## 2018-07-02 DIAGNOSIS — E78.5 HYPERLIPIDEMIA, UNSPECIFIED HYPERLIPIDEMIA TYPE: ICD-10-CM

## 2018-07-02 DIAGNOSIS — H69.80 DYSFUNCTION OF EUSTACHIAN TUBE, UNSPECIFIED LATERALITY: ICD-10-CM

## 2018-07-02 DIAGNOSIS — J01.40 ACUTE PANSINUSITIS, RECURRENCE NOT SPECIFIED: Primary | ICD-10-CM

## 2018-07-02 PROBLEM — F41.9 ANXIETY: Status: ACTIVE | Noted: 2017-01-21

## 2018-07-02 PROBLEM — F41.1 GAD (GENERALIZED ANXIETY DISORDER): Status: ACTIVE | Noted: 2017-01-21

## 2018-07-02 PROCEDURE — 99214 OFFICE O/P EST MOD 30 MIN: CPT | Performed by: FAMILY MEDICINE

## 2018-07-02 RX ORDER — AZELASTINE 1 MG/ML
2 SPRAY, METERED NASAL 2 TIMES DAILY
Qty: 30 ML | Refills: 0 | Status: SHIPPED | OUTPATIENT
Start: 2018-07-02 | End: 2019-01-23

## 2018-07-02 RX ORDER — BUPROPION HYDROCHLORIDE 300 MG/1
300 TABLET ORAL
COMMUNITY
Start: 2018-06-22 | End: 2020-12-21 | Stop reason: SDUPTHER

## 2018-07-02 RX ORDER — AZITHROMYCIN 250 MG/1
TABLET, FILM COATED ORAL
Qty: 6 TABLET | Refills: 0 | Status: SHIPPED | OUTPATIENT
Start: 2018-07-02 | End: 2018-07-07

## 2018-07-02 NOTE — PROGRESS NOTES
Assessment/Plan:  1  Acute sinusitis, Z-Spencer prescribed  2  Allergic rhinitis/eustachian tube dysfunction, Astelin was prescribed  3  Hypothyroidism, blood work ordered  4  Hyperlipidemia blood work ordered  5  Vitamin-D deficiency blood work ordered  6  Colon cancer screening, Hemoccult was ordered  7  Patient to return in 1 month with her usual primary care provider, Braeden Aquino  If still symptoms in 1 week return to office at that point        Allergic rhinitis  Astelin ordered    Eustachian tube dysfunction  Astelin ordered    Hypothyroidism  Blood work ordered    Vitamin D deficiency  Blood work ordered    Hyperlipemia  Blood work ordered    Screening for colon cancer  Hemoccult is ordered with blood work       Diagnoses and all orders for this visit:    Acute pansinusitis, recurrence not specified  -     azithromycin (ZITHROMAX) 250 mg tablet; Take 2 tablets today then 1 tablet daily till finished    Allergic rhinitis, unspecified seasonality, unspecified trigger  -     azelastine (ASTELIN) 0 1 % nasal spray; 2 sprays into each nostril 2 (two) times a day Use in each nostril as directed    Dysfunction of Eustachian tube, unspecified laterality  -     azelastine (ASTELIN) 0 1 % nasal spray; 2 sprays into each nostril 2 (two) times a day Use in each nostril as directed    Hyperlipidemia, unspecified hyperlipidemia type  -     Comprehensive metabolic panel; Future  -     CBC; Future  -     Lipid Panel with Direct LDL reflex; Future  -     T4; Future  -     TSH, 3rd generation with Free T4 reflex; Future  -     Urinalysis with microscopic; Future  -     Occult Bloood,Fecal Immunochemical; Future  -     Vitamin D 25 hydroxy; Future    Hypothyroidism, unspecified type  -     Comprehensive metabolic panel; Future  -     CBC; Future  -     Lipid Panel with Direct LDL reflex; Future  -     T4; Future  -     TSH, 3rd generation with Free T4 reflex; Future  -     Urinalysis with microscopic;  Future  - Occult Bloood,Fecal Immunochemical; Future  -     Vitamin D 25 hydroxy; Future    Vitamin D deficiency  -     Comprehensive metabolic panel; Future  -     CBC; Future  -     Lipid Panel with Direct LDL reflex; Future  -     T4; Future  -     TSH, 3rd generation with Free T4 reflex; Future  -     Urinalysis with microscopic; Future  -     Occult Bloood,Fecal Immunochemical; Future  -     Vitamin D 25 hydroxy; Future    Screening for colon cancer  -     Comprehensive metabolic panel; Future  -     CBC; Future  -     Lipid Panel with Direct LDL reflex; Future  -     T4; Future  -     TSH, 3rd generation with Free T4 reflex; Future  -     Urinalysis with microscopic; Future  -     Occult Bloood,Fecal Immunochemical; Future  -     Vitamin D 25 hydroxy; Future          Subjective: patient c/o PND, sore throat, headache, sinus pressure, ear and neck pain x 3 days  Patient is taking Tylenol and Motrin with some relief  No cough or nasal congestion  ak     Patient ID: Delilah Watkins is a 62 y o  female  The past 3 days patient is having increasing sinus pain and pressure sneezing sinus headache  Postnasal drip scratchy throat mild dry cough  Patient is using over-the-counter Tylenol Motrin with very limited relief  Positive fever no chills or night sweats patient has not had blood work for her thyroid in over a year        The following portions of the patient's history were reviewed and updated as appropriate: allergies, current medications, past family history, past medical history, past social history, past surgical history and problem list     Review of Systems   Constitutional: Positive for fever  Negative for chills  HENT:        HPI   Eyes: Negative  Respiratory:        HPI   Cardiovascular: Negative  Gastrointestinal: Negative  Endocrine:        HPI   Genitourinary: Negative  Musculoskeletal: Negative  Skin: Negative  Allergic/Immunologic: Positive for environmental allergies  Neurological:        Mild sinus headache   Hematological: Negative  Psychiatric/Behavioral: Negative  Objective:      /62   Pulse 68   Temp 99 1 °F (37 3 °C)   Wt 55 3 kg (122 lb)   BMI 21 61 kg/m²          Physical Exam   Constitutional: She is oriented to person, place, and time  She appears well-developed and well-nourished  HENT:   Head: Normocephalic and atraumatic  Right TM with chronic perforation left TM with otosclerosis no injection positive allergic turbinates positive pansinus tenderness to percussion positive purulent postnasal drip minimal pharyngeal injection negative exudate positive shotty cervical anterior adenopathy   Eyes: Conjunctivae and EOM are normal  Pupils are equal, round, and reactive to light  No scleral icterus  Neck: Normal range of motion  Neck supple  Cardiovascular: Normal rate, regular rhythm and normal heart sounds  Pulmonary/Chest: Effort normal and breath sounds normal    Musculoskeletal: She exhibits no edema or tenderness  Lymphadenopathy:     She has cervical adenopathy  Neurological: She is alert and oriented to person, place, and time  No cranial nerve deficit  Negative meningeal signs   Skin: Skin is warm and dry  Psychiatric: She has a normal mood and affect

## 2018-07-25 ENCOUNTER — APPOINTMENT (OUTPATIENT)
Dept: LAB | Facility: CLINIC | Age: 58
End: 2018-07-25
Payer: COMMERCIAL

## 2018-07-25 DIAGNOSIS — E55.9 VITAMIN D DEFICIENCY: ICD-10-CM

## 2018-07-25 DIAGNOSIS — Z12.11 SCREENING FOR COLON CANCER: ICD-10-CM

## 2018-07-25 DIAGNOSIS — E78.5 HYPERLIPIDEMIA, UNSPECIFIED HYPERLIPIDEMIA TYPE: ICD-10-CM

## 2018-07-25 DIAGNOSIS — E03.9 HYPOTHYROIDISM, UNSPECIFIED TYPE: ICD-10-CM

## 2018-07-25 LAB
25(OH)D3 SERPL-MCNC: 19.6 NG/ML (ref 30–100)
ALBUMIN SERPL BCP-MCNC: 4 G/DL (ref 3.5–5)
ALP SERPL-CCNC: 62 U/L (ref 46–116)
ALT SERPL W P-5'-P-CCNC: 47 U/L (ref 12–78)
ANION GAP SERPL CALCULATED.3IONS-SCNC: 3 MMOL/L (ref 4–13)
AST SERPL W P-5'-P-CCNC: 23 U/L (ref 5–45)
BILIRUB SERPL-MCNC: 0.35 MG/DL (ref 0.2–1)
BUN SERPL-MCNC: 17 MG/DL (ref 5–25)
CALCIUM SERPL-MCNC: 8.7 MG/DL (ref 8.3–10.1)
CHLORIDE SERPL-SCNC: 106 MMOL/L (ref 100–108)
CHOLEST SERPL-MCNC: 251 MG/DL (ref 50–200)
CO2 SERPL-SCNC: 28 MMOL/L (ref 21–32)
CREAT SERPL-MCNC: 0.82 MG/DL (ref 0.6–1.3)
ERYTHROCYTE [DISTWIDTH] IN BLOOD BY AUTOMATED COUNT: 12.8 % (ref 11.6–15.1)
GFR SERPL CREATININE-BSD FRML MDRD: 79 ML/MIN/1.73SQ M
GLUCOSE P FAST SERPL-MCNC: 90 MG/DL (ref 65–99)
HCT VFR BLD AUTO: 43.3 % (ref 34.8–46.1)
HDLC SERPL-MCNC: 41 MG/DL (ref 40–60)
HGB BLD-MCNC: 13.7 G/DL (ref 11.5–15.4)
LDLC SERPL CALC-MCNC: 166 MG/DL (ref 0–100)
MCH RBC QN AUTO: 28.5 PG (ref 26.8–34.3)
MCHC RBC AUTO-ENTMCNC: 31.6 G/DL (ref 31.4–37.4)
MCV RBC AUTO: 90 FL (ref 82–98)
PLATELET # BLD AUTO: 257 THOUSANDS/UL (ref 149–390)
PMV BLD AUTO: 9.3 FL (ref 8.9–12.7)
POTASSIUM SERPL-SCNC: 3.9 MMOL/L (ref 3.5–5.3)
PROT SERPL-MCNC: 7.3 G/DL (ref 6.4–8.2)
RBC # BLD AUTO: 4.8 MILLION/UL (ref 3.81–5.12)
SODIUM SERPL-SCNC: 137 MMOL/L (ref 136–145)
T4 FREE SERPL-MCNC: 0.81 NG/DL (ref 0.76–1.46)
T4 SERPL-MCNC: 6.9 UG/DL (ref 4.7–13.3)
TRIGL SERPL-MCNC: 219 MG/DL
TSH SERPL DL<=0.05 MIU/L-ACNC: 4.95 UIU/ML (ref 0.36–3.74)
WBC # BLD AUTO: 4.89 THOUSAND/UL (ref 4.31–10.16)

## 2018-07-25 PROCEDURE — 82306 VITAMIN D 25 HYDROXY: CPT

## 2018-07-25 PROCEDURE — 84439 ASSAY OF FREE THYROXINE: CPT

## 2018-07-25 PROCEDURE — 80053 COMPREHEN METABOLIC PANEL: CPT

## 2018-07-25 PROCEDURE — 85027 COMPLETE CBC AUTOMATED: CPT

## 2018-07-25 PROCEDURE — 84443 ASSAY THYROID STIM HORMONE: CPT

## 2018-07-25 PROCEDURE — 36415 COLL VENOUS BLD VENIPUNCTURE: CPT

## 2018-07-25 PROCEDURE — 80061 LIPID PANEL: CPT

## 2018-07-26 ENCOUNTER — APPOINTMENT (OUTPATIENT)
Dept: LAB | Facility: CLINIC | Age: 58
End: 2018-07-26
Payer: COMMERCIAL

## 2018-07-26 DIAGNOSIS — E78.5 HYPERLIPIDEMIA, UNSPECIFIED HYPERLIPIDEMIA TYPE: ICD-10-CM

## 2018-07-26 DIAGNOSIS — Z12.11 SCREENING FOR COLON CANCER: ICD-10-CM

## 2018-07-26 DIAGNOSIS — R82.90 ABNORMAL FINDING IN URINE: Primary | ICD-10-CM

## 2018-07-26 DIAGNOSIS — E55.9 VITAMIN D DEFICIENCY: ICD-10-CM

## 2018-07-26 DIAGNOSIS — E03.9 HYPOTHYROIDISM, UNSPECIFIED TYPE: ICD-10-CM

## 2018-07-26 LAB
BACTERIA UR QL AUTO: ABNORMAL /HPF
BILIRUB UR QL STRIP: NEGATIVE
CAOX CRY URNS QL MICRO: ABNORMAL /HPF
CLARITY UR: ABNORMAL
COLOR UR: YELLOW
GLUCOSE UR STRIP-MCNC: NEGATIVE MG/DL
HEMOCCULT STL QL IA: NEGATIVE
HGB UR QL STRIP.AUTO: NEGATIVE
KETONES UR STRIP-MCNC: NEGATIVE MG/DL
LEUKOCYTE ESTERASE UR QL STRIP: ABNORMAL
NITRITE UR QL STRIP: NEGATIVE
NON-SQ EPI CELLS URNS QL MICRO: ABNORMAL /HPF
PH UR STRIP.AUTO: 6 [PH] (ref 4.5–8)
PROT UR STRIP-MCNC: NEGATIVE MG/DL
RBC #/AREA URNS AUTO: ABNORMAL /HPF
SP GR UR STRIP.AUTO: 1.03 (ref 1–1.03)
UROBILINOGEN UR QL STRIP.AUTO: 0.2 E.U./DL
WBC #/AREA URNS AUTO: ABNORMAL /HPF

## 2018-07-26 PROCEDURE — G0328 FECAL BLOOD SCRN IMMUNOASSAY: HCPCS

## 2018-07-26 PROCEDURE — 81001 URINALYSIS AUTO W/SCOPE: CPT

## 2018-07-27 ENCOUNTER — OFFICE VISIT (OUTPATIENT)
Dept: FAMILY MEDICINE CLINIC | Facility: CLINIC | Age: 58
End: 2018-07-27
Payer: COMMERCIAL

## 2018-07-27 ENCOUNTER — HOSPITAL ENCOUNTER (OUTPATIENT)
Dept: ULTRASOUND IMAGING | Facility: HOSPITAL | Age: 58
Discharge: HOME | End: 2018-07-27
Payer: COMMERCIAL

## 2018-07-27 VITALS
HEART RATE: 68 BPM | SYSTOLIC BLOOD PRESSURE: 116 MMHG | BODY MASS INDEX: 21.79 KG/M2 | WEIGHT: 123 LBS | TEMPERATURE: 98.2 F | HEIGHT: 63 IN | DIASTOLIC BLOOD PRESSURE: 68 MMHG

## 2018-07-27 DIAGNOSIS — M79.652 THIGH PAIN, MUSCULOSKELETAL, LEFT: ICD-10-CM

## 2018-07-27 DIAGNOSIS — M79.652 THIGH PAIN, MUSCULOSKELETAL, LEFT: Primary | ICD-10-CM

## 2018-07-27 PROCEDURE — 99213 OFFICE O/P EST LOW 20 MIN: CPT | Performed by: FAMILY MEDICINE

## 2018-07-27 PROCEDURE — 76882 US LMTD JT/FCL EVL NVASC XTR: CPT

## 2018-07-27 PROCEDURE — 3008F BODY MASS INDEX DOCD: CPT | Performed by: FAMILY MEDICINE

## 2018-07-27 PROCEDURE — 1036F TOBACCO NON-USER: CPT | Performed by: FAMILY MEDICINE

## 2018-07-27 RX ORDER — PHENOL 1.4 %
600 AEROSOL, SPRAY (ML) MUCOUS MEMBRANE 2 TIMES DAILY WITH MEALS
COMMUNITY

## 2018-07-27 RX ORDER — EVENING PRIMROSE OIL 500 MG
1 CAPSULE ORAL
COMMUNITY
End: 2019-09-11 | Stop reason: HOSPADM

## 2018-07-27 RX ORDER — VITAMIN B COMPLEX
1 CAPSULE ORAL DAILY
COMMUNITY

## 2018-07-27 RX ORDER — ASCORBIC ACID 500 MG
500 TABLET ORAL DAILY
COMMUNITY
End: 2019-09-11 | Stop reason: HOSPADM

## 2018-07-27 RX ORDER — NAPROXEN SODIUM 220 MG
440 TABLET ORAL 2 TIMES DAILY WITH MEALS
Qty: 120 TABLET | Refills: 0
Start: 2018-07-27 | End: 2018-11-16

## 2018-07-27 RX ORDER — MELATONIN
1000 DAILY
COMMUNITY

## 2018-07-27 NOTE — PROGRESS NOTES
Assessment/Plan:    Thigh pain, musculoskeletal, left  There is swelling and irritation just posterior to the medial aspect of the knee at the joint line  Recommend US  Question Baker Cyst          Diagnoses and all orders for this visit:    Thigh pain, musculoskeletal, left  -     US extremity soft tissue; Future  -     naproxen sodium (ALEVE) 220 MG tablet; Take 2 tablets (440 mg total) by mouth 2 (two) times a day with meals for 30 days    Other orders  -     cholecalciferol (VITAMIN D3) 1,000 units tablet; Take 1,000 Units by mouth daily  -     ascorbic acid (VITAMIN C) 500 mg tablet; Take 500 mg by mouth daily  -     b complex vitamins capsule; Take 1 capsule by mouth daily  -     calcium carbonate (OS-GABRIELLE) 600 MG tablet; Take 600 mg by mouth 2 (two) times a day with meals  -     Evening Primrose Oil 500 MG CAPS; Take by mouth          Subjective:   Left knee pain that has been ongoing for a year, but last night became worse  Area is sensitive to touch and makes it difficult to walk  No specific injury  -  [facility]     Patient ID: Zulma Mcclain is a 62 y o  female  Patient does have a significant amount of pain in the left knee  She noted it recently to get worse  Please see chief complaint  She did report that she stop eating meat, and her knees seem to be improving in the past   It was also other joints that improved  Unfortunately, this area of swelling and pain is now new and problematic  So far she did not try any medications for it  When she is sitting in the office she does not have a significant amount of pain  If you touch the area it causes a dramatic increase in pain  Of note, the patient is flying internationally next week  The following portions of the patient's history were reviewed and updated as appropriate: allergies, current medications and problem list     Review of Systems   Constitutional: Negative  HENT: Negative  Eyes: Negative      Respiratory: Negative  Cardiovascular: Negative  Musculoskeletal: Positive for myalgias  Objective:      /68 (BP Location: Left arm, Patient Position: Sitting, Cuff Size: Standard)   Pulse 68   Temp 98 2 °F (36 8 °C) (Oral)   Ht 5' 3" (1 6 m)   Wt 55 8 kg (123 lb)   BMI 21 79 kg/m²          Physical Exam   Constitutional: She appears well-developed and well-nourished  HENT:   Head: Normocephalic  Musculoskeletal:        Left knee: She exhibits swelling  Tenderness found  Legs:  Nursing note and vitals reviewed

## 2018-07-27 NOTE — ASSESSMENT & PLAN NOTE
There is swelling and irritation just posterior to the medial aspect of the knee at the joint line  Recommend US    Question Zepeda Cyst

## 2018-07-27 NOTE — ASSESSMENT & PLAN NOTE
Patient's ultrasound was abnormal, and creates the possibility of a communicating cyst with the knee  At this point, radiology is recommending that the patient have an MRI  Given that [place] is going away international in the near future, I would recommend stat MRI

## 2018-07-27 NOTE — PROGRESS NOTES
Problem List Items Addressed This Visit        Other    Thigh pain, musculoskeletal, left - Primary     Patient's ultrasound was abnormal, and creates the possibility of a communicating cyst with the knee  At this point, radiology is recommending that the patient have an MRI  Given that she is going away international in the near future, I would recommend stat MRI           Relevant Orders    MRI knee left  wo contrast

## 2018-07-28 ENCOUNTER — HOSPITAL ENCOUNTER (OUTPATIENT)
Dept: MRI IMAGING | Facility: HOSPITAL | Age: 58
Discharge: HOME | End: 2018-07-28
Payer: COMMERCIAL

## 2018-07-28 DIAGNOSIS — M79.652 THIGH PAIN, MUSCULOSKELETAL, LEFT: ICD-10-CM

## 2018-07-28 PROCEDURE — 73721 MRI JNT OF LWR EXTRE W/O DYE: CPT

## 2018-07-30 ENCOUNTER — APPOINTMENT (OUTPATIENT)
Dept: LAB | Facility: CLINIC | Age: 58
End: 2018-07-30
Payer: COMMERCIAL

## 2018-07-30 ENCOUNTER — TELEPHONE (OUTPATIENT)
Dept: FAMILY MEDICINE CLINIC | Facility: CLINIC | Age: 58
End: 2018-07-30

## 2018-07-30 DIAGNOSIS — R82.90 ABNORMAL FINDING IN URINE: ICD-10-CM

## 2018-07-30 LAB
BACTERIA UR QL AUTO: ABNORMAL /HPF
BILIRUB UR QL STRIP: NEGATIVE
CAOX CRY URNS QL MICRO: ABNORMAL /HPF
CLARITY UR: ABNORMAL
COLOR UR: YELLOW
GLUCOSE UR STRIP-MCNC: NEGATIVE MG/DL
HGB UR QL STRIP.AUTO: NEGATIVE
KETONES UR STRIP-MCNC: NEGATIVE MG/DL
LEUKOCYTE ESTERASE UR QL STRIP: NEGATIVE
NITRITE UR QL STRIP: NEGATIVE
NON-SQ EPI CELLS URNS QL MICRO: ABNORMAL /HPF
PH UR STRIP.AUTO: 5.5 [PH] (ref 4.5–8)
PROT UR STRIP-MCNC: NEGATIVE MG/DL
RBC #/AREA URNS AUTO: ABNORMAL /HPF
SP GR UR STRIP.AUTO: 1.03 (ref 1–1.03)
UROBILINOGEN UR QL STRIP.AUTO: 0.2 E.U./DL
WBC #/AREA URNS AUTO: ABNORMAL /HPF

## 2018-07-30 PROCEDURE — 81001 URINALYSIS AUTO W/SCOPE: CPT

## 2018-07-30 PROCEDURE — 87086 URINE CULTURE/COLONY COUNT: CPT

## 2018-07-30 NOTE — TELEPHONE ENCOUNTER
Per Dr Nikko Grullon results show baker cyst follow with ortho  Called pt with results  Pt aware   R Yomi

## 2018-07-31 LAB — BACTERIA UR CULT: NORMAL

## 2018-09-14 DIAGNOSIS — E03.9 ACQUIRED HYPOTHYROIDISM: ICD-10-CM

## 2018-09-14 RX ORDER — LEVOTHYROXINE SODIUM 0.03 MG/1
TABLET ORAL
Qty: 90 TABLET | Refills: 0 | Status: SHIPPED | OUTPATIENT
Start: 2018-09-14 | End: 2018-12-15 | Stop reason: SDUPTHER

## 2018-11-16 ENCOUNTER — OFFICE VISIT (OUTPATIENT)
Dept: FAMILY MEDICINE CLINIC | Facility: CLINIC | Age: 58
End: 2018-11-16
Payer: COMMERCIAL

## 2018-11-16 VITALS
RESPIRATION RATE: 16 BRPM | BODY MASS INDEX: 22.15 KG/M2 | WEIGHT: 125 LBS | HEART RATE: 68 BPM | SYSTOLIC BLOOD PRESSURE: 110 MMHG | DIASTOLIC BLOOD PRESSURE: 68 MMHG | HEIGHT: 63 IN

## 2018-11-16 DIAGNOSIS — F41.1 GAD (GENERALIZED ANXIETY DISORDER): ICD-10-CM

## 2018-11-16 DIAGNOSIS — R07.9 CHEST PAIN, UNSPECIFIED TYPE: Primary | ICD-10-CM

## 2018-11-16 DIAGNOSIS — R11.0 NAUSEA: ICD-10-CM

## 2018-11-16 DIAGNOSIS — E03.9 HYPOTHYROIDISM, UNSPECIFIED TYPE: ICD-10-CM

## 2018-11-16 DIAGNOSIS — E78.49 OTHER HYPERLIPIDEMIA: ICD-10-CM

## 2018-11-16 PROBLEM — Z12.11 SCREENING FOR COLON CANCER: Status: RESOLVED | Noted: 2018-07-02 | Resolved: 2018-11-16

## 2018-11-16 PROCEDURE — 93000 ELECTROCARDIOGRAM COMPLETE: CPT | Performed by: FAMILY MEDICINE

## 2018-11-16 PROCEDURE — 3008F BODY MASS INDEX DOCD: CPT | Performed by: FAMILY MEDICINE

## 2018-11-16 PROCEDURE — 99214 OFFICE O/P EST MOD 30 MIN: CPT | Performed by: FAMILY MEDICINE

## 2018-11-16 RX ORDER — MELOXICAM 7.5 MG/1
7.5 TABLET ORAL DAILY
Qty: 5 TABLET | Refills: 0 | Status: SHIPPED | OUTPATIENT
Start: 2018-11-16 | End: 2019-01-23

## 2018-11-16 NOTE — PATIENT INSTRUCTIONS
Chest Pain   AMBULATORY CARE:   Chest pain  can be caused by a range of conditions, from not serious to life-threatening  It may be caused by a heart attack or a blood clot in your lungs  Sometimes chest pain or pressure is caused by poor blood flow to your heart (angina)  Infection, inflammation, or a fracture in the bones or cartilage in your chest can cause pain or discomfort  Chest pain can also be a symptom of a digestive problem, such as acid reflux or a stomach ulcer  An anxiety attack or a strong emotion such as anger can also cause chest pain  It is important to follow up with your healthcare provider to find the cause of your chest pain  Common symptoms you may have with chest pain:   · Fever or sweating     · Nausea or vomiting     · Shortness of breath     · Discomfort or pressure that spreads from your chest to your back, jaw, or arm     · A racing or slow heartbeat     · Feeling weak, tired, or faint  Call 911 if:   · You have any of the following signs of a heart attack:      ¨ Squeezing, pressure, or pain in your chest that lasts longer than 5 minutes or returns    ¨ Discomfort or pain in your back, neck, jaw, stomach, or arm     ¨ Trouble breathing    ¨ Nausea or vomiting    ¨ Lightheadedness or a sudden cold sweat, especially with chest pain or trouble breathing    Seek care immediately if:   · You have chest discomfort that gets worse, even with medicine  · You cough or vomit blood  · Your bowel movements are black or bloody  · You cannot stop vomiting, or it hurts to swallow  Contact your healthcare provider if:   · You have questions or concerns about your condition or care  Treatment for chest pain  may include medicine to treat your symptoms while your healthcare provider finds the cause of your chest pain  · Medicines  may be given to treat the cause of your chest pain  Examples include pain medicine, anxiety medicine, or medicines to increase blood flow to your heart  · Do not take certain medicines without asking your healthcare provider first   These include NSAIDs, herbal or vitamin supplements, or hormones (estrogen or progestin)  Follow up with your healthcare provider within 72 hours, or as directed: You may need to return for more tests to find the cause of your chest pain  You may be referred to a specialist, such as a cardiologist or gastroenterologist  Write down your questions so you remember to ask them during your visits  Healthy living tips: The following are general healthy guidelines  If your chest pain is caused by a heart problem, your healthcare provider will give you specific guidelines to follow  · Do not smoke  Nicotine and other chemicals in cigarettes and cigars can cause lung and heart damage  Ask your healthcare provider for information if you currently smoke and need help to quit  E-cigarettes or smokeless tobacco still contain nicotine  Talk to your healthcare provider before you use these products  · Eat a variety of healthy, low-fat foods  Healthy foods include fruits, vegetables, whole-grain breads, low-fat dairy products, beans, lean meats, and fish  Ask for more information about a heart healthy diet  · Ask about activity  Your healthcare provider will tell you which activities to limit or avoid  Ask when you can drive, return to work, and have sex  Ask about the best exercise plan for you  · Maintain a healthy weight  Ask your healthcare provider how much you should weigh  Ask him or her to help you create a weight loss plan if you are overweight  · Get the flu and pneumonia vaccines  All adults should get the influenza (flu) vaccine  Get it every year as soon as it becomes available  The pneumococcal vaccine is given to adults aged 72 years or older  The vaccine is given every 5 years to prevent pneumococcal disease, such as pneumonia    © 2017 [employer] Information is for End User's use only and may not be sold, redistributed or otherwise used for commercial purposes  All illustrations and images included in CareNotes® are the copyrighted property of A [employer]  or [employer]el  The above information is an  only  It is not intended as medical advice for individual conditions or treatments  Talk to your doctor, nurse or pharmacist before following any medical regimen to see if it is safe and effective for you

## 2018-11-16 NOTE — ASSESSMENT & PLAN NOTE
Left-sided chest pain radiating to left shoulder, with nausea, EKG no significant finding, stress test to be done non urgently  Discussed with patient if her chest pain get worse patient to go to the emergency room immediately    Meloxicam was given to help her pain if it is musculoskeletal,

## 2018-11-16 NOTE — PROGRESS NOTES
Assessment/Plan:    Chest pain  Left-sided chest pain radiating to left shoulder, with nausea, EKG no significant finding, stress test to be done non urgently  Discussed with patient if her chest pain get worse patient to go to the emergency room immediately  Meloxicam was given to help her pain if it is musculoskeletal,    Nausea  This could be related to the pain, advised to use Maalox over-the-counter, Zantac over-the-counter, if not better to return to the office  CIERA (generalized anxiety disorder)  Seem to be stable and under control, no trigger    Other hyperlipidemia  Stable, continue without statin  Diagnoses and all orders for this visit:    Chest pain, unspecified type  -     POCT ECG  -     Echo stress test w contrast if indicated; Future  -     meloxicam (MOBIC) 7 5 mg tablet; Take 1 tablet (7 5 mg total) by mouth daily    Nausea    Other hyperlipidemia    CIERA (generalized anxiety disorder)    Hypothyroidism, unspecified type          Subjective: Pt here with complains of chest pain and nausea since yesterday  KAMRYN Celaya     Patient ID: Balaji Murphy is a 62 y o  female  Patient complaining of chest pain since yesterday in the afternoon, her pain get worse at night after she was shoveling, her pain resolved for the night but then this morning her pain started again 6/10 on the left chest radiating to her left shoulder, associated with nausea, no vomiting, no palpitation, no fever or chills, no recent upper respiratory symptoms  Chest Pain    This is a new problem  The current episode started yesterday  The onset quality is sudden  The problem occurs constantly  The problem has been gradually worsening  The pain is at a severity of 6/10  The pain is moderate  The quality of the pain is described as dull, heavy and pressure  The pain radiates to the left shoulder  Associated symptoms include nausea   Pertinent negatives include no abdominal pain, back pain, claudication, cough, diaphoresis, dizziness, exertional chest pressure, fever, headaches, hemoptysis, irregular heartbeat, leg pain, lower extremity edema, malaise/fatigue, near-syncope, numbness, orthopnea, palpitations, PND, shortness of breath, sputum production, syncope, vomiting or weakness  The pain is aggravated by exertion  She has tried nothing for the symptoms  The treatment provided no relief  There are no known risk factors  The following portions of the patient's history were reviewed and updated as appropriate: allergies, current medications, past family history, past medical history, past social history, past surgical history and problem list     Review of Systems   Constitutional: Negative for activity change, appetite change, chills, diaphoresis, fatigue, fever and malaise/fatigue  HENT: Negative for congestion, postnasal drip, sinus pressure, sore throat and voice change  Eyes: Negative for pain, redness and visual disturbance  Respiratory: Negative for cough, hemoptysis, sputum production, chest tightness, shortness of breath and wheezing  Cardiovascular: Positive for chest pain  Negative for palpitations, orthopnea, claudication, syncope, PND and near-syncope  Gastrointestinal: Positive for nausea  Negative for abdominal pain, constipation, diarrhea and vomiting  Endocrine: Negative for cold intolerance, heat intolerance and polyuria  Genitourinary: Negative for dysuria, enuresis, flank pain and frequency  Musculoskeletal: Negative for back pain, gait problem, joint swelling and neck pain  Skin: Negative for color change and wound  Neurological: Negative for dizziness, syncope, speech difficulty, weakness, numbness and headaches  Psychiatric/Behavioral: Negative for behavioral problems and confusion  The patient is not nervous/anxious            Objective:      Vitals:    11/16/18 1317   BP: 110/68   BP Location: Left arm   Patient Position: Sitting   Cuff Size: Standard   Pulse: 68   Resp: 16 Weight: 56 7 kg (125 lb)   Height: 5' 3" (1 6 m)          Physical Exam   Constitutional: She is oriented to person, place, and time  She appears well-developed and well-nourished  HENT:   Head: Normocephalic and atraumatic  Eyes: Pupils are equal, round, and reactive to light  Conjunctivae and EOM are normal    Neck: Normal range of motion  Neck supple  Cardiovascular: Normal rate, regular rhythm, normal heart sounds and intact distal pulses  Pulmonary/Chest: Effort normal and breath sounds normal    Abdominal: Soft  Bowel sounds are normal    Musculoskeletal: Normal range of motion  Neurological: She is alert and oriented to person, place, and time  She has normal reflexes  Skin: Skin is warm and dry  Psychiatric: She has a normal mood and affect  Her behavior is normal    Nursing note and vitals reviewed      Procedures

## 2018-11-16 NOTE — ASSESSMENT & PLAN NOTE
This could be related to the pain, advised to use Maalox over-the-counter, Zantac over-the-counter, if not better to return to the office

## 2018-11-28 ENCOUNTER — HOSPITAL ENCOUNTER (OUTPATIENT)
Dept: NON INVASIVE DIAGNOSTICS | Facility: CLINIC | Age: 58
Discharge: HOME | End: 2018-11-28
Payer: COMMERCIAL

## 2018-11-28 DIAGNOSIS — R07.9 CHEST PAIN, UNSPECIFIED TYPE: ICD-10-CM

## 2018-11-28 PROCEDURE — 93351 STRESS TTE COMPLETE: CPT | Performed by: INTERNAL MEDICINE

## 2018-11-28 PROCEDURE — 93350 STRESS TTE ONLY: CPT

## 2018-11-29 LAB
CHEST PAIN STATEMENT: NORMAL
MAX DIASTOLIC BP: 70 MMHG
MAX HEART RATE: 144 BPM
MAX PREDICTED HEART RATE: 162 BPM
MAX. SYSTOLIC BP: 156 MMHG
PROTOCOL NAME: NORMAL
TARGET HR FORMULA: NORMAL
TEST INDICATION: NORMAL
TIME IN EXERCISE PHASE: NORMAL

## 2018-12-15 DIAGNOSIS — E03.9 ACQUIRED HYPOTHYROIDISM: ICD-10-CM

## 2018-12-17 RX ORDER — LEVOTHYROXINE SODIUM 0.03 MG/1
TABLET ORAL
Qty: 90 TABLET | Refills: 0 | Status: SHIPPED | OUTPATIENT
Start: 2018-12-17 | End: 2019-03-15 | Stop reason: SDUPTHER

## 2019-01-23 ENCOUNTER — OFFICE VISIT (OUTPATIENT)
Dept: FAMILY MEDICINE CLINIC | Facility: CLINIC | Age: 59
End: 2019-01-23
Payer: COMMERCIAL

## 2019-01-23 VITALS
HEART RATE: 76 BPM | DIASTOLIC BLOOD PRESSURE: 70 MMHG | HEIGHT: 63 IN | WEIGHT: 128 LBS | BODY MASS INDEX: 22.68 KG/M2 | SYSTOLIC BLOOD PRESSURE: 112 MMHG

## 2019-01-23 DIAGNOSIS — M79.605 PAIN IN BOTH LOWER EXTREMITIES: Primary | ICD-10-CM

## 2019-01-23 DIAGNOSIS — J01.00 ACUTE NON-RECURRENT MAXILLARY SINUSITIS: ICD-10-CM

## 2019-01-23 DIAGNOSIS — M79.604 PAIN IN BOTH LOWER EXTREMITIES: Primary | ICD-10-CM

## 2019-01-23 PROCEDURE — 1036F TOBACCO NON-USER: CPT | Performed by: PHYSICIAN ASSISTANT

## 2019-01-23 PROCEDURE — 99214 OFFICE O/P EST MOD 30 MIN: CPT | Performed by: PHYSICIAN ASSISTANT

## 2019-01-23 RX ORDER — CYCLOBENZAPRINE HCL 10 MG
10 TABLET ORAL 3 TIMES DAILY PRN
Qty: 30 TABLET | Refills: 0 | Status: SHIPPED | OUTPATIENT
Start: 2019-01-23 | End: 2019-04-22 | Stop reason: ALTCHOICE

## 2019-01-23 RX ORDER — AZITHROMYCIN 250 MG/1
TABLET, FILM COATED ORAL
Qty: 6 TABLET | Refills: 0 | Status: SHIPPED | OUTPATIENT
Start: 2019-01-23 | End: 2019-01-28

## 2019-01-23 NOTE — PROGRESS NOTES
Assessment/Plan:  Patient Instructions   1  Bilateral thigh pain-likely secondary to sciatica-recommend trial of meloxicam 15 mg daily for the next 1-2 weeks  Patient also given Flexeril 10 mg to be used up to 3 times daily as needed for muscle spasm or tightness  If patient is not doing better in the next 1-2 weeks would recommend further evaluation by physical therapy and considering imaging  2   Acute sinusitis-recommended Zithromax Z-Spencer as directed  The patient was also advised to use Flonase, 2 sprays in each nostril daily  They may use over-the-counter NSAIDs such as ibuprofen as necessary for pressure  Follow-up in the next 4-5 days if not improved  Diagnoses and all orders for this visit:    Pain in both lower extremities  -     cyclobenzaprine (FLEXERIL) 10 mg tablet; Take 1 tablet (10 mg total) by mouth 3 (three) times a day as needed for muscle spasms    Acute non-recurrent maxillary sinusitis  -     azithromycin (ZITHROMAX) 250 mg tablet; Take 2 Tabs by mouth today, then Take 1 tablet daily for 4 more days  Subjective:   C/o intermittent shooting pains down B/L legs  Onset  weeks  mjs     Patient ID: Davida Winters is a 62 y o  female  HPI:  This is a 54-year-old female who presents to the office with concerns over bilateral leg pain which has been coming and going for the past several weeks  She has been lifting a lot heavy items and is having some low back pain across the low back as well  She states sometimes the leg pain can be quite sharp and intense and last up to a half day before it seems to let up  She has not tried anything over-the-counter for it yet  She has not had any weakness in the legs or trouble with walking  Sometimes after she is standing for a while she will get a sharp pain  She does continue to go to the gym and exercise regularly  She also is concerned with increased headaches and sinus congestion recently    She has been having some dizziness and feels that her ears are full  The following portions of the patient's history were reviewed and updated as appropriate: allergies, current medications, past family history, past medical history, past social history, past surgical history and problem list     Review of Systems   Constitutional: Negative for activity change, fatigue and fever  HENT: Positive for congestion, postnasal drip, rhinorrhea, sinus pressure and sore throat  Negative for ear pain  Respiratory: Negative for cough, chest tightness, shortness of breath and wheezing  Cardiovascular: Negative for palpitations  Gastrointestinal: Negative for diarrhea and nausea  Musculoskeletal: Positive for arthralgias, back pain and myalgias  Skin: Negative for rash  Neurological: Negative for dizziness and numbness  All other systems reviewed and are negative  Objective:      /70   Pulse 76   Ht 5' 3" (1 6 m)   Wt 58 1 kg (128 lb)   BMI 22 67 kg/m²          Physical Exam   Constitutional: She is oriented to person, place, and time  She appears well-developed and well-nourished  No distress  HENT:   Head: Normocephalic and atraumatic  Mouth/Throat: No oropharyngeal exudate  Turbinates are erythematous and edematous bilaterally  Post nasal drip of the posterior pharynx noted  Bilateral tympanic membranes are dull with fluid effusion  Eyes: Pupils are equal, round, and reactive to light  Right eye exhibits no discharge  Left eye exhibits no discharge  Neck: Neck supple  Cardiovascular: Normal rate, regular rhythm and normal heart sounds  Exam reveals no friction rub  No murmur heard  Pulmonary/Chest: Effort normal and breath sounds normal  No respiratory distress  She has no wheezes  She has no rales  Musculoskeletal: Normal range of motion  She exhibits no edema  Full range of motion with lumbar spine  No point tenderness to palpation  Lymphadenopathy:     She has cervical adenopathy  Neurological: She is alert and oriented to person, place, and time  Skin: Skin is warm and dry  No erythema  Psychiatric: She has a normal mood and affect  Her behavior is normal    Nursing note and vitals reviewed

## 2019-01-23 NOTE — PATIENT INSTRUCTIONS
1   Bilateral thigh pain-likely secondary to sciatica-recommend trial of meloxicam 15 mg daily for the next 1-2 weeks  Patient also given Flexeril 10 mg to be used up to 3 times daily as needed for muscle spasm or tightness  If patient is not doing better in the next 1-2 weeks would recommend further evaluation by physical therapy and considering imaging  2   Acute sinusitis-recommended Zithromax Z-Spencer as directed  The patient was also advised to use Flonase, 2 sprays in each nostril daily  They may use over-the-counter NSAIDs such as ibuprofen as necessary for pressure  Follow-up in the next 4-5 days if not improved

## 2019-02-06 ENCOUNTER — OFFICE VISIT (OUTPATIENT)
Dept: FAMILY MEDICINE CLINIC | Facility: CLINIC | Age: 59
End: 2019-02-06
Payer: COMMERCIAL

## 2019-02-06 VITALS
HEIGHT: 63 IN | WEIGHT: 125.8 LBS | TEMPERATURE: 99 F | HEART RATE: 68 BPM | BODY MASS INDEX: 22.29 KG/M2 | DIASTOLIC BLOOD PRESSURE: 64 MMHG | SYSTOLIC BLOOD PRESSURE: 112 MMHG

## 2019-02-06 DIAGNOSIS — E03.9 HYPOTHYROIDISM, UNSPECIFIED TYPE: ICD-10-CM

## 2019-02-06 DIAGNOSIS — J01.01 ACUTE RECURRENT MAXILLARY SINUSITIS: Primary | ICD-10-CM

## 2019-02-06 DIAGNOSIS — M54.30 SCIATICA, UNSPECIFIED LATERALITY: ICD-10-CM

## 2019-02-06 PROCEDURE — 99214 OFFICE O/P EST MOD 30 MIN: CPT | Performed by: PHYSICIAN ASSISTANT

## 2019-02-06 PROCEDURE — 3008F BODY MASS INDEX DOCD: CPT | Performed by: PHYSICIAN ASSISTANT

## 2019-02-06 RX ORDER — MELOXICAM 15 MG/1
15 TABLET ORAL DAILY
Qty: 30 TABLET | Refills: 2 | Status: SHIPPED | OUTPATIENT
Start: 2019-02-06 | End: 2019-09-11 | Stop reason: HOSPADM

## 2019-02-06 RX ORDER — SULFAMETHOXAZOLE AND TRIMETHOPRIM 800; 160 MG/1; MG/1
1 TABLET ORAL EVERY 12 HOURS SCHEDULED
Qty: 20 TABLET | Refills: 0 | Status: SHIPPED | OUTPATIENT
Start: 2019-02-06 | End: 2019-02-16

## 2019-02-06 NOTE — PATIENT INSTRUCTIONS
1   Acute sinusitis-recommended Bactrim DS, 1 tablet twice daily for 10 days with food as directed  The patient was also advised to use Flonase, 2 sprays in each nostril daily  They may use over-the-counter NSAIDs such as ibuprofen as necessary for pressure  Follow-up in the next 4-5 days if not improved  2  Sciatica-patient given refill of meloxicam 15 mg daily as necessary  3  Hypothyroidism-presently stable with levothyroxine

## 2019-02-06 NOTE — PROGRESS NOTES
Assessment/Plan:  Patient Instructions   1  Acute sinusitis-recommended Bactrim DS, 1 tablet twice daily for 10 days with food as directed  The patient was also advised to use Flonase, 2 sprays in each nostril daily  They may use over-the-counter NSAIDs such as ibuprofen as necessary for pressure  Follow-up in the next 4-5 days if not improved  2  Sciatica-patient given refill of meloxicam 15 mg daily as necessary  3  Hypothyroidism-presently stable with levothyroxine  Diagnoses and all orders for this visit:    Acute recurrent maxillary sinusitis  -     sulfamethoxazole-trimethoprim (BACTRIM DS) 800-160 mg per tablet; Take 1 tablet by mouth every 12 (twelve) hours for 10 days    Sciatica, unspecified laterality  -     meloxicam (MOBIC) 15 mg tablet; Take 1 tablet (15 mg total) by mouth daily    Hypothyroidism, unspecified type          Subjective: Pt c/o still not better from recent sinus infection  She states she is no longer using the Flonase or antibiotic  kw     Patient ID: Barney Marti is a 62 y o  female  Recurrent symptoms of pain in her right maxillary sinus area and head  She feels that this is going into her ear on the right side  She has had some decreased hearing on the right side  She was recently treated with Zithromax for a sinus infection and felt that the symptoms were better for several days but started to return  She has been run down in a little bit fatigue as well as experiencing some nausea and stomach upset  The following portions of the patient's history were reviewed and updated as appropriate: allergies, current medications, past family history, past medical history, past social history, past surgical history and problem list     Review of Systems   Constitutional: Positive for activity change and fatigue  Negative for fever  HENT: Positive for congestion, postnasal drip, rhinorrhea, sinus pressure and sore throat  Negative for ear pain      Respiratory: Positive for cough  Negative for chest tightness, shortness of breath and wheezing  Cardiovascular: Negative for palpitations  Gastrointestinal: Negative for diarrhea and nausea  Musculoskeletal: Positive for myalgias  Negative for arthralgias  Skin: Negative for rash  Neurological: Negative for dizziness and numbness  All other systems reviewed and are negative  Objective:      /64 (BP Location: Left arm)   Pulse 68   Temp 99 °F (37 2 °C) (Tympanic)   Ht 5' 3" (1 6 m)   Wt 57 1 kg (125 lb 12 8 oz)   BMI 22 28 kg/m²          Physical Exam   Constitutional: She is oriented to person, place, and time  She appears well-developed and well-nourished  No distress  HENT:   Head: Normocephalic and atraumatic  Mouth/Throat: No oropharyngeal exudate  Turbinates are erythematous and edematous bilaterally  Post nasal drip of the posterior pharynx noted  Eyes: Pupils are equal, round, and reactive to light  Right eye exhibits no discharge  Left eye exhibits no discharge  Neck: Neck supple  Cardiovascular: Normal rate, regular rhythm and normal heart sounds  Exam reveals no friction rub  No murmur heard  Pulmonary/Chest: Effort normal and breath sounds normal  No respiratory distress  She has no wheezes  She has no rales  Musculoskeletal: Normal range of motion  She exhibits no edema  Lymphadenopathy:     She has cervical adenopathy  Neurological: She is alert and oriented to person, place, and time  Skin: Skin is warm and dry  No erythema  Psychiatric: She has a normal mood and affect  Her behavior is normal    Nursing note and vitals reviewed

## 2019-02-13 ENCOUNTER — TELEPHONE (OUTPATIENT)
Dept: FAMILY MEDICINE CLINIC | Facility: CLINIC | Age: 59
End: 2019-02-13

## 2019-02-13 NOTE — TELEPHONE ENCOUNTER
Pt is on her second round of antibiotics for her sinus infection she has a couple days left of her antibiotic but she still feels very ill and nauseous, she would like to know what she should do, please call pt

## 2019-02-13 NOTE — TELEPHONE ENCOUNTER
Nausea may be from the antibiotic that she is taking  She only has another 2 days left  Would recommend that she take it with food and possibly use some Pepto-Bismol for nausea as necessary  If her sinus infection symptoms are persistent I would recommend further evaluation by Otolaryngology

## 2019-03-15 DIAGNOSIS — E03.9 ACQUIRED HYPOTHYROIDISM: ICD-10-CM

## 2019-03-15 RX ORDER — LEVOTHYROXINE SODIUM 0.03 MG/1
TABLET ORAL
Qty: 90 TABLET | Refills: 0 | Status: SHIPPED | OUTPATIENT
Start: 2019-03-15 | End: 2019-06-27 | Stop reason: SDUPTHER

## 2019-04-22 ENCOUNTER — OFFICE VISIT (OUTPATIENT)
Dept: FAMILY MEDICINE CLINIC | Facility: CLINIC | Age: 59
End: 2019-04-22
Payer: COMMERCIAL

## 2019-04-22 VITALS
TEMPERATURE: 98.5 F | WEIGHT: 124 LBS | DIASTOLIC BLOOD PRESSURE: 80 MMHG | HEART RATE: 68 BPM | SYSTOLIC BLOOD PRESSURE: 122 MMHG | BODY MASS INDEX: 21.97 KG/M2 | HEIGHT: 63 IN

## 2019-04-22 DIAGNOSIS — R11.0 NAUSEA: ICD-10-CM

## 2019-04-22 DIAGNOSIS — E03.9 HYPOTHYROIDISM, UNSPECIFIED TYPE: ICD-10-CM

## 2019-04-22 DIAGNOSIS — F41.1 GAD (GENERALIZED ANXIETY DISORDER): Primary | ICD-10-CM

## 2019-04-22 DIAGNOSIS — F41.8 DEPRESSION WITH ANXIETY: ICD-10-CM

## 2019-04-22 DIAGNOSIS — E78.49 OTHER HYPERLIPIDEMIA: ICD-10-CM

## 2019-04-22 DIAGNOSIS — E55.9 VITAMIN D DEFICIENCY: ICD-10-CM

## 2019-04-22 DIAGNOSIS — M25.50 PAIN IN JOINT, MULTIPLE SITES: ICD-10-CM

## 2019-04-22 PROBLEM — M25.569 KNEE PAIN: Status: ACTIVE | Noted: 2018-07-27

## 2019-04-22 PROBLEM — M71.20 SYNOVIAL CYST OF POPLITEAL SPACE: Status: ACTIVE | Noted: 2018-10-04

## 2019-04-22 PROBLEM — M22.40 CHONDROMALACIA PATELLAE: Status: ACTIVE | Noted: 2018-10-04

## 2019-04-22 PROBLEM — G56.00 CARPAL TUNNEL SYNDROME: Status: ACTIVE | Noted: 2019-04-22

## 2019-04-22 PROCEDURE — 99214 OFFICE O/P EST MOD 30 MIN: CPT | Performed by: FAMILY MEDICINE

## 2019-04-23 ENCOUNTER — APPOINTMENT (OUTPATIENT)
Dept: LAB | Facility: CLINIC | Age: 59
End: 2019-04-23
Payer: COMMERCIAL

## 2019-04-23 DIAGNOSIS — F41.1 GAD (GENERALIZED ANXIETY DISORDER): ICD-10-CM

## 2019-04-23 DIAGNOSIS — R11.0 NAUSEA: ICD-10-CM

## 2019-04-23 DIAGNOSIS — E55.9 VITAMIN D DEFICIENCY: ICD-10-CM

## 2019-04-23 DIAGNOSIS — E03.9 HYPOTHYROIDISM, UNSPECIFIED TYPE: ICD-10-CM

## 2019-04-23 DIAGNOSIS — M25.50 PAIN IN JOINT, MULTIPLE SITES: ICD-10-CM

## 2019-04-23 DIAGNOSIS — E78.49 OTHER HYPERLIPIDEMIA: ICD-10-CM

## 2019-04-23 DIAGNOSIS — F41.8 DEPRESSION WITH ANXIETY: ICD-10-CM

## 2019-04-23 LAB
25(OH)D3 SERPL-MCNC: 38.4 NG/ML (ref 30–100)
ALBUMIN SERPL BCP-MCNC: 4 G/DL (ref 3.5–5)
ALP SERPL-CCNC: 57 U/L (ref 46–116)
ALT SERPL W P-5'-P-CCNC: 26 U/L (ref 12–78)
ANION GAP SERPL CALCULATED.3IONS-SCNC: 6 MMOL/L (ref 4–13)
AST SERPL W P-5'-P-CCNC: 20 U/L (ref 5–45)
BASOPHILS # BLD AUTO: 0.02 THOUSANDS/ΜL (ref 0–0.1)
BASOPHILS NFR BLD AUTO: 0 % (ref 0–1)
BILIRUB SERPL-MCNC: 0.5 MG/DL (ref 0.2–1)
BUN SERPL-MCNC: 16 MG/DL (ref 5–25)
CALCIUM SERPL-MCNC: 8.6 MG/DL (ref 8.3–10.1)
CHLORIDE SERPL-SCNC: 109 MMOL/L (ref 100–108)
CHOLEST SERPL-MCNC: 214 MG/DL (ref 50–200)
CO2 SERPL-SCNC: 27 MMOL/L (ref 21–32)
CREAT SERPL-MCNC: 0.9 MG/DL (ref 0.6–1.3)
EOSINOPHIL # BLD AUTO: 0.07 THOUSAND/ΜL (ref 0–0.61)
EOSINOPHIL NFR BLD AUTO: 2 % (ref 0–6)
ERYTHROCYTE [DISTWIDTH] IN BLOOD BY AUTOMATED COUNT: 13.2 % (ref 11.6–15.1)
ERYTHROCYTE [SEDIMENTATION RATE] IN BLOOD: 12 MM/HOUR (ref 0–20)
GFR SERPL CREATININE-BSD FRML MDRD: 71 ML/MIN/1.73SQ M
GLUCOSE P FAST SERPL-MCNC: 89 MG/DL (ref 65–99)
HCT VFR BLD AUTO: 39.9 % (ref 34.8–46.1)
HDLC SERPL-MCNC: 44 MG/DL (ref 40–60)
HGB BLD-MCNC: 13.1 G/DL (ref 11.5–15.4)
IMM GRANULOCYTES # BLD AUTO: 0.01 THOUSAND/UL (ref 0–0.2)
IMM GRANULOCYTES NFR BLD AUTO: 0 % (ref 0–2)
LDLC SERPL CALC-MCNC: 129 MG/DL (ref 0–100)
LYMPHOCYTES # BLD AUTO: 1.86 THOUSANDS/ΜL (ref 0.6–4.47)
LYMPHOCYTES NFR BLD AUTO: 40 % (ref 14–44)
MCH RBC QN AUTO: 29.8 PG (ref 26.8–34.3)
MCHC RBC AUTO-ENTMCNC: 32.8 G/DL (ref 31.4–37.4)
MCV RBC AUTO: 91 FL (ref 82–98)
MONOCYTES # BLD AUTO: 0.45 THOUSAND/ΜL (ref 0.17–1.22)
MONOCYTES NFR BLD AUTO: 10 % (ref 4–12)
NEUTROPHILS # BLD AUTO: 2.21 THOUSANDS/ΜL (ref 1.85–7.62)
NEUTS SEG NFR BLD AUTO: 48 % (ref 43–75)
NRBC BLD AUTO-RTO: 0 /100 WBCS
PLATELET # BLD AUTO: 280 THOUSANDS/UL (ref 149–390)
PMV BLD AUTO: 9.2 FL (ref 8.9–12.7)
POTASSIUM SERPL-SCNC: 3.9 MMOL/L (ref 3.5–5.3)
PROT SERPL-MCNC: 6.8 G/DL (ref 6.4–8.2)
RBC # BLD AUTO: 4.39 MILLION/UL (ref 3.81–5.12)
SODIUM SERPL-SCNC: 142 MMOL/L (ref 136–145)
TRIGL SERPL-MCNC: 205 MG/DL
TSH SERPL DL<=0.05 MIU/L-ACNC: 4.28 UIU/ML (ref 0.36–3.74)
WBC # BLD AUTO: 4.62 THOUSAND/UL (ref 4.31–10.16)

## 2019-04-23 PROCEDURE — 85025 COMPLETE CBC W/AUTO DIFF WBC: CPT

## 2019-04-23 PROCEDURE — 86038 ANTINUCLEAR ANTIBODIES: CPT

## 2019-04-23 PROCEDURE — 86618 LYME DISEASE ANTIBODY: CPT

## 2019-04-23 PROCEDURE — 85652 RBC SED RATE AUTOMATED: CPT

## 2019-04-23 PROCEDURE — 80053 COMPREHEN METABOLIC PANEL: CPT

## 2019-04-23 PROCEDURE — 82306 VITAMIN D 25 HYDROXY: CPT

## 2019-04-23 PROCEDURE — 84443 ASSAY THYROID STIM HORMONE: CPT

## 2019-04-23 PROCEDURE — 36415 COLL VENOUS BLD VENIPUNCTURE: CPT

## 2019-04-23 PROCEDURE — 80061 LIPID PANEL: CPT

## 2019-04-24 LAB — RYE IGE QN: NEGATIVE

## 2019-04-25 LAB
B BURGDOR IGG SER IA-ACNC: 0.13
B BURGDOR IGM SER IA-ACNC: 0.36

## 2019-04-29 ENCOUNTER — OFFICE VISIT (OUTPATIENT)
Dept: FAMILY MEDICINE CLINIC | Facility: CLINIC | Age: 59
End: 2019-04-29
Payer: COMMERCIAL

## 2019-04-29 VITALS
BODY MASS INDEX: 21.97 KG/M2 | HEART RATE: 64 BPM | DIASTOLIC BLOOD PRESSURE: 64 MMHG | HEIGHT: 63 IN | WEIGHT: 124 LBS | SYSTOLIC BLOOD PRESSURE: 116 MMHG

## 2019-04-29 DIAGNOSIS — E78.2 MIXED HYPERLIPIDEMIA: ICD-10-CM

## 2019-04-29 DIAGNOSIS — R42 DIZZY SPELLS: Primary | ICD-10-CM

## 2019-04-29 DIAGNOSIS — R51.9 FREQUENT HEADACHES: ICD-10-CM

## 2019-04-29 DIAGNOSIS — R26.89 LOSS OF BALANCE: ICD-10-CM

## 2019-04-29 PROCEDURE — 99214 OFFICE O/P EST MOD 30 MIN: CPT | Performed by: FAMILY MEDICINE

## 2019-04-29 RX ORDER — ATORVASTATIN CALCIUM 10 MG/1
10 TABLET, FILM COATED ORAL DAILY
Qty: 90 TABLET | Refills: 3 | Status: SHIPPED | OUTPATIENT
Start: 2019-04-29 | End: 2019-08-26 | Stop reason: SINTOL

## 2019-04-30 ENCOUNTER — TELEPHONE (OUTPATIENT)
Dept: FAMILY MEDICINE CLINIC | Facility: CLINIC | Age: 59
End: 2019-04-30

## 2019-04-30 DIAGNOSIS — R51.9 FREQUENT HEADACHES: Primary | ICD-10-CM

## 2019-04-30 DIAGNOSIS — R42 DIZZY SPELLS: ICD-10-CM

## 2019-05-02 ENCOUNTER — OFFICE VISIT (OUTPATIENT)
Dept: FAMILY MEDICINE CLINIC | Facility: CLINIC | Age: 59
End: 2019-05-02
Payer: COMMERCIAL

## 2019-05-02 VITALS
BODY MASS INDEX: 21.79 KG/M2 | HEART RATE: 68 BPM | DIASTOLIC BLOOD PRESSURE: 70 MMHG | HEIGHT: 63 IN | TEMPERATURE: 98 F | SYSTOLIC BLOOD PRESSURE: 100 MMHG | WEIGHT: 123 LBS

## 2019-05-02 DIAGNOSIS — E78.2 MIXED HYPERLIPIDEMIA: ICD-10-CM

## 2019-05-02 DIAGNOSIS — R30.0 DYSURIA: Primary | ICD-10-CM

## 2019-05-02 DIAGNOSIS — R51.9 FREQUENT HEADACHES: ICD-10-CM

## 2019-05-02 LAB
SL AMB  POCT GLUCOSE, UA: ABNORMAL
SL AMB LEUKOCYTE ESTERASE,UA: ABNORMAL
SL AMB POCT BILIRUBIN,UA: ABNORMAL
SL AMB POCT BLOOD,UA: ABNORMAL
SL AMB POCT CLARITY,UA: CLEAR
SL AMB POCT COLOR,UA: YELLOW
SL AMB POCT KETONES,UA: ABNORMAL
SL AMB POCT NITRITE,UA: ABNORMAL
SL AMB POCT PH,UA: 5
SL AMB POCT SPECIFIC GRAVITY,UA: >=1.03
SL AMB POCT URINE PROTEIN: ABNORMAL
SL AMB POCT UROBILINOGEN: 0.2

## 2019-05-02 PROCEDURE — 81002 URINALYSIS NONAUTO W/O SCOPE: CPT | Performed by: PHYSICIAN ASSISTANT

## 2019-05-02 PROCEDURE — 99214 OFFICE O/P EST MOD 30 MIN: CPT | Performed by: PHYSICIAN ASSISTANT

## 2019-05-02 PROCEDURE — 87086 URINE CULTURE/COLONY COUNT: CPT | Performed by: PHYSICIAN ASSISTANT

## 2019-05-02 RX ORDER — SULFAMETHOXAZOLE AND TRIMETHOPRIM 800; 160 MG/1; MG/1
1 TABLET ORAL EVERY 12 HOURS SCHEDULED
Qty: 20 TABLET | Refills: 0 | Status: SHIPPED | OUTPATIENT
Start: 2019-05-02 | End: 2019-05-09

## 2019-05-03 LAB — BACTERIA UR CULT: NORMAL

## 2019-05-09 ENCOUNTER — OFFICE VISIT (OUTPATIENT)
Dept: FAMILY MEDICINE CLINIC | Facility: CLINIC | Age: 59
End: 2019-05-09
Payer: COMMERCIAL

## 2019-05-09 VITALS
HEIGHT: 63 IN | DIASTOLIC BLOOD PRESSURE: 70 MMHG | BODY MASS INDEX: 21.79 KG/M2 | TEMPERATURE: 98.4 F | HEART RATE: 76 BPM | WEIGHT: 123 LBS | SYSTOLIC BLOOD PRESSURE: 124 MMHG

## 2019-05-09 DIAGNOSIS — R35.0 URINARY FREQUENCY: ICD-10-CM

## 2019-05-09 DIAGNOSIS — R51.9 FREQUENT HEADACHES: ICD-10-CM

## 2019-05-09 DIAGNOSIS — T78.40XA ALLERGIC REACTION TO DRUG, INITIAL ENCOUNTER: Primary | ICD-10-CM

## 2019-05-09 DIAGNOSIS — L50.9 HIVES: ICD-10-CM

## 2019-05-09 PROCEDURE — 99214 OFFICE O/P EST MOD 30 MIN: CPT | Performed by: PHYSICIAN ASSISTANT

## 2019-05-09 RX ORDER — PREDNISONE 10 MG/1
TABLET ORAL
Qty: 21 TABLET | Refills: 0 | Status: SHIPPED | OUTPATIENT
Start: 2019-05-09 | End: 2019-05-15

## 2019-05-28 ENCOUNTER — CONSULT (OUTPATIENT)
Dept: NEUROLOGY | Facility: CLINIC | Age: 59
End: 2019-05-28
Payer: COMMERCIAL

## 2019-05-28 VITALS
HEART RATE: 79 BPM | WEIGHT: 124 LBS | HEIGHT: 63 IN | BODY MASS INDEX: 21.97 KG/M2 | SYSTOLIC BLOOD PRESSURE: 127 MMHG | DIASTOLIC BLOOD PRESSURE: 60 MMHG

## 2019-05-28 DIAGNOSIS — R26.89 LOSS OF BALANCE: ICD-10-CM

## 2019-05-28 DIAGNOSIS — R42 DIZZY SPELLS: ICD-10-CM

## 2019-05-28 DIAGNOSIS — R51.9 WORSENING HEADACHES: Primary | ICD-10-CM

## 2019-05-28 DIAGNOSIS — R51.9 FREQUENT HEADACHES: ICD-10-CM

## 2019-05-28 DIAGNOSIS — M79.18 MYOFASCIAL PAIN ON LEFT SIDE: ICD-10-CM

## 2019-05-28 DIAGNOSIS — M54.2 CERVICALGIA: ICD-10-CM

## 2019-05-28 DIAGNOSIS — J32.0 CHRONIC MAXILLARY SINUSITIS: ICD-10-CM

## 2019-05-28 PROCEDURE — 99244 OFF/OP CNSLTJ NEW/EST MOD 40: CPT | Performed by: PSYCHIATRY & NEUROLOGY

## 2019-05-28 RX ORDER — BACLOFEN 10 MG/1
TABLET ORAL
Qty: 30 TABLET | Refills: 0 | Status: SHIPPED | OUTPATIENT
Start: 2019-05-28 | End: 2019-08-06 | Stop reason: ALTCHOICE

## 2019-05-28 RX ORDER — PREDNISONE 10 MG/1
TABLET ORAL
COMMUNITY
Start: 2019-05-09 | End: 2019-05-28

## 2019-06-11 ENCOUNTER — APPOINTMENT (OUTPATIENT)
Dept: LAB | Facility: CLINIC | Age: 59
End: 2019-06-11
Payer: COMMERCIAL

## 2019-06-11 DIAGNOSIS — R42 DIZZY SPELLS: ICD-10-CM

## 2019-06-11 LAB
BUN SERPL-MCNC: 16 MG/DL (ref 5–25)
CREAT SERPL-MCNC: 0.89 MG/DL (ref 0.6–1.3)
GFR SERPL CREATININE-BSD FRML MDRD: 72 ML/MIN/1.73SQ M
VIT B12 SERPL-MCNC: 541 PG/ML (ref 100–900)

## 2019-06-11 PROCEDURE — 84520 ASSAY OF UREA NITROGEN: CPT

## 2019-06-11 PROCEDURE — 36415 COLL VENOUS BLD VENIPUNCTURE: CPT

## 2019-06-11 PROCEDURE — 82607 VITAMIN B-12: CPT

## 2019-06-11 PROCEDURE — 82565 ASSAY OF CREATININE: CPT

## 2019-06-16 ENCOUNTER — HOSPITAL ENCOUNTER (OUTPATIENT)
Dept: MRI IMAGING | Facility: HOSPITAL | Age: 59
Discharge: HOME | End: 2019-06-16
Attending: PSYCHIATRY & NEUROLOGY
Payer: COMMERCIAL

## 2019-06-16 ENCOUNTER — HOSPITAL ENCOUNTER (OUTPATIENT)
Dept: CT IMAGING | Facility: HOSPITAL | Age: 59
Discharge: HOME | End: 2019-06-16
Attending: PSYCHIATRY & NEUROLOGY
Payer: COMMERCIAL

## 2019-06-16 DIAGNOSIS — J32.0 CHRONIC MAXILLARY SINUSITIS: ICD-10-CM

## 2019-06-16 DIAGNOSIS — R51.9 WORSENING HEADACHES: ICD-10-CM

## 2019-06-16 DIAGNOSIS — R42 DIZZY SPELLS: ICD-10-CM

## 2019-06-16 PROCEDURE — A9585 GADOBUTROL INJECTION: HCPCS | Performed by: PSYCHIATRY & NEUROLOGY

## 2019-06-16 PROCEDURE — 70553 MRI BRAIN STEM W/O & W/DYE: CPT

## 2019-06-16 PROCEDURE — 70486 CT MAXILLOFACIAL W/O DYE: CPT

## 2019-06-16 RX ADMIN — GADOBUTROL 5 ML: 604.72 INJECTION INTRAVENOUS at 11:52

## 2019-06-18 ENCOUNTER — TELEPHONE (OUTPATIENT)
Dept: NEUROLOGY | Facility: CLINIC | Age: 59
End: 2019-06-18

## 2019-06-20 ENCOUNTER — OFFICE VISIT (OUTPATIENT)
Dept: FAMILY MEDICINE CLINIC | Facility: CLINIC | Age: 59
End: 2019-06-20
Payer: COMMERCIAL

## 2019-06-20 VITALS
TEMPERATURE: 98.6 F | BODY MASS INDEX: 21.79 KG/M2 | DIASTOLIC BLOOD PRESSURE: 70 MMHG | SYSTOLIC BLOOD PRESSURE: 110 MMHG | HEIGHT: 63 IN | WEIGHT: 123 LBS | HEART RATE: 68 BPM

## 2019-06-20 DIAGNOSIS — R39.15 URINARY URGENCY: ICD-10-CM

## 2019-06-20 DIAGNOSIS — M54.32 BILATERAL SCIATICA: ICD-10-CM

## 2019-06-20 DIAGNOSIS — J32.9 CHRONIC SINUSITIS, UNSPECIFIED LOCATION: Primary | ICD-10-CM

## 2019-06-20 DIAGNOSIS — M54.31 BILATERAL SCIATICA: ICD-10-CM

## 2019-06-20 PROBLEM — M54.30 SCIATICA: Status: ACTIVE | Noted: 2019-06-20

## 2019-06-20 LAB
SL AMB  POCT GLUCOSE, UA: ABNORMAL
SL AMB LEUKOCYTE ESTERASE,UA: ABNORMAL
SL AMB POCT BILIRUBIN,UA: ABNORMAL
SL AMB POCT BLOOD,UA: ABNORMAL
SL AMB POCT CLARITY,UA: ABNORMAL
SL AMB POCT COLOR,UA: YELLOW
SL AMB POCT KETONES,UA: 15
SL AMB POCT NITRITE,UA: ABNORMAL
SL AMB POCT PH,UA: 5.5
SL AMB POCT SPECIFIC GRAVITY,UA: >=1.03
SL AMB POCT URINE PROTEIN: ABNORMAL
SL AMB POCT UROBILINOGEN: 0.2

## 2019-06-20 PROCEDURE — 1036F TOBACCO NON-USER: CPT | Performed by: FAMILY MEDICINE

## 2019-06-20 PROCEDURE — 99214 OFFICE O/P EST MOD 30 MIN: CPT | Performed by: FAMILY MEDICINE

## 2019-06-20 PROCEDURE — 81002 URINALYSIS NONAUTO W/O SCOPE: CPT | Performed by: FAMILY MEDICINE

## 2019-06-20 PROCEDURE — 3008F BODY MASS INDEX DOCD: CPT | Performed by: FAMILY MEDICINE

## 2019-06-26 ENCOUNTER — TELEPHONE (OUTPATIENT)
Dept: NEUROLOGY | Facility: CLINIC | Age: 59
End: 2019-06-26

## 2019-06-27 DIAGNOSIS — E03.9 ACQUIRED HYPOTHYROIDISM: ICD-10-CM

## 2019-06-27 RX ORDER — LEVOTHYROXINE SODIUM 0.03 MG/1
TABLET ORAL
Qty: 90 TABLET | Refills: 0 | Status: SHIPPED | OUTPATIENT
Start: 2019-06-27 | End: 2019-10-03 | Stop reason: SDUPTHER

## 2019-07-03 ENCOUNTER — TELEPHONE (OUTPATIENT)
Dept: NEUROLOGY | Facility: CLINIC | Age: 59
End: 2019-07-03

## 2019-07-03 NOTE — TELEPHONE ENCOUNTER
Called and spoke with patient regarding rescheduling appointment for 7/16/19 due to Dr Germania Knight not being in the office during that time, Patient agreed, I did offer appointment for 7/9/19 but patient will only be able to come in at 1:00 pm but unfortunately we do not have anything open I did let her know if there is a cancellation we will contact her  Patient rescheduled for 8/6/19 at 10:00 am  Patient was wondering if she can talk to someone in billing let her know Ramona Martinez from billing is not in yet and she will give her a call when she arrives

## 2019-07-05 ENCOUNTER — APPOINTMENT (OUTPATIENT)
Dept: LAB | Facility: CLINIC | Age: 59
End: 2019-07-05
Payer: COMMERCIAL

## 2019-07-05 ENCOUNTER — TRANSCRIBE ORDERS (OUTPATIENT)
Dept: LAB | Facility: CLINIC | Age: 59
End: 2019-07-05

## 2019-07-05 DIAGNOSIS — N76.6 VULVAR ULCER: Primary | ICD-10-CM

## 2019-07-05 PROCEDURE — 86696 HERPES SIMPLEX TYPE 2 TEST: CPT

## 2019-07-05 PROCEDURE — 86695 HERPES SIMPLEX TYPE 1 TEST: CPT

## 2019-07-06 LAB
HSV1 IGG SER IA-ACNC: 57.1 INDEX (ref 0–0.9)
HSV2 IGG SER IA-ACNC: <0.91 INDEX (ref 0–0.9)
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
## 2019-08-06 ENCOUNTER — OFFICE VISIT (OUTPATIENT)
Dept: NEUROLOGY | Facility: CLINIC | Age: 59
End: 2019-08-06
Payer: COMMERCIAL

## 2019-08-06 VITALS
SYSTOLIC BLOOD PRESSURE: 122 MMHG | DIASTOLIC BLOOD PRESSURE: 57 MMHG | BODY MASS INDEX: 21.93 KG/M2 | HEART RATE: 60 BPM | WEIGHT: 123.8 LBS | RESPIRATION RATE: 12 BRPM

## 2019-08-06 DIAGNOSIS — M25.50 PAIN IN JOINT, MULTIPLE SITES: ICD-10-CM

## 2019-08-06 DIAGNOSIS — M79.18 MYOFASCIAL PAIN: Primary | ICD-10-CM

## 2019-08-06 DIAGNOSIS — M79.18 MYOFASCIAL PAIN ON LEFT SIDE: ICD-10-CM

## 2019-08-06 PROCEDURE — 99245 OFF/OP CONSLTJ NEW/EST HI 55: CPT | Performed by: PHYSICAL MEDICINE & REHABILITATION

## 2019-08-06 RX ORDER — METAXALONE 800 MG/1
400 TABLET ORAL 3 TIMES DAILY PRN
Qty: 90 TABLET | Refills: 0 | Status: SHIPPED | OUTPATIENT
Start: 2019-08-06 | End: 2020-06-01 | Stop reason: ALTCHOICE

## 2019-08-06 NOTE — PATIENT INSTRUCTIONS
Therapeutic Massage - go every 2 weeks x 3 months (Look into massage schools for better affordability)  Outpatient PT - to improve your overall flexibility and gradual strength  Discontinue Baclofen  Trial with Skelaxin 400mg 3x/day as needed for muscle tightness spasms  If you feel excessive drowsiness discontinue  We can consider muscle injection in the future      Try an antiinflammatory diet

## 2019-08-23 NOTE — H&P (VIEW-ONLY)
Assessment/Plan:  There have been no changes in patient's past medical history  Medical clearance is pending the results of her lab work, per PCP note from yesterday  Patient will plan to followup in our office postoperatively  Diagnoses and all orders for this visit:    Chronic sphenoidal sinusitis      Deviated nasal septum      Encounter Diagnosis     ICD-10-CM    1   Chronic sphenoidal sinusitis J32 3 Case request operating room: FUNCTIONAL ENDOSCOPIC SINUS SURGERY (FESS) IMAGED GUIDED, SEPTOPLASTY     Basic metabolic panel     CBC and differential     Case request operating room: FUNCTIONAL ENDOSCOPIC SINUS SURGERY (FESS) IMAGED GUIDED, SEPTOPLASTY   2  Chronic sinusitis, unspecified location J32 9 Case request operating room: FUNCTIONAL ENDOSCOPIC SINUS SURGERY (FESS) IMAGED GUIDED, SEPTOPLASTY     Basic metabolic panel     CBC and differential     Case request operating room: FUNCTIONAL ENDOSCOPIC SINUS SURGERY (FESS) IMAGED GUIDED, SEPTOPLASTY   3  Deviated nasal septum J34 2 Case request operating room: FUNCTIONAL ENDOSCOPIC SINUS SURGERY (FESS) IMAGED GUIDED, SEPTOPLASTY     Basic metabolic panel     CBC and differential     Case request operating room: FUNCTIONAL ENDOSCOPIC SINUS SURGERY (FESS) IMAGED GUIDED, SEPTOPLASTY   4  Pre-operative laboratory examination Z01 812 Case request operating room: FUNCTIONAL ENDOSCOPIC SINUS SURGERY (FESS) IMAGED GUIDED, SEPTOPLASTY     Basic metabolic panel     CBC and differential     APTT     Protime-INR     XR chest pa & lateral     Case request operating room: FUNCTIONAL ENDOSCOPIC SINUS SURGERY (FESS) IMAGED GUIDED, SEPTOPLASTY   5  Preoperative testing Z01 818 Case request operating room: FUNCTIONAL ENDOSCOPIC SINUS SURGERY (FESS) IMAGED GUIDED, SEPTOPLASTY     Basic metabolic panel     CBC and differential     APTT     Protime-INR     XR chest pa & lateral     Case request operating room: FUNCTIONAL ENDOSCOPIC SINUS SURGERY (FESS) IMAGED GUIDED, SEPTOPLASTY            Patient ID: Vanessa Smith is a 61 y o  female  HPI  Patient Is scheduled for septoplasty and fess next month  She has no new complaints today  There have been no changes in her past medical history  Medical clearance is pending the results of her lab work per PCP note from yesterday  Previous history of present illness was reviewed and is as follows: She is a 60-year-old female who is referred for chronic sinus disease noted on CT scan confirmed on MRI of the brain with the MRI of the brain negative for focal brain abnormalities  CT of the sinuses personally reviewed which showed near opacification of the left sphenoid sinus with minimal inflammatory changes noted in additional sinuses with no acute or chronic sinusitis in other pan sinuses, other than left sphenoid  Patient has been seen by neurology as well as imaging performed for complaint of chronic headaches  Patient is still having a chronic heaches described on the crown of her head  She also reports chronic inability to breath through her right side of her nose       The following portions of the patient's history were reviewed and updated as appropriate: allergies, current medications, past family history, past medical history, past social history, past surgical history and problem list       Past Medical History:   Diagnosis Date    Chronic sinusitis     last assessed 2013    Grief reaction     last assessed 2017    Scabies     last assessed 3/27/2015    Sciatica     last assessed 2013       Past Surgical History:   Procedure Laterality Date     SECTION         Social History     Tobacco Use    Smoking status: Never Smoker    Smokeless tobacco: Never Used    Tobacco comment: secondhand smoke exposure   Substance Use Topics    Alcohol use: Yes     Comment: socially    Drug use: No       Current Outpatient Medications on File Prior to Visit   Medication Sig Dispense Refill    ascorbic acid (VITAMIN C) 500 mg tablet Take 500 mg by mouth daily      b complex vitamins capsule Take 1 capsule by mouth daily      buPROPion (WELLBUTRIN XL) 300 mg 24 hr tablet Take 300 mg by mouth      calcium carbonate (OS-GABRILELE) 600 MG tablet Take 600 mg by mouth 2 (two) times a day with meals      cholecalciferol (VITAMIN D3) 1,000 units tablet Take 1,000 Units by mouth daily      Evening Primrose Oil 500 MG CAPS Take by mouth      levothyroxine 25 mcg tablet TAKE 1 TABLET BY MOUTH DAILY 90 tablet 0    metaxalone (SKELAXIN) 800 mg tablet Take 0 5 tablets (400 mg total) by mouth 3 (three) times a day as needed for muscle spasms 90 tablet 0    meloxicam (MOBIC) 15 mg tablet Take 1 tablet (15 mg total) by mouth daily (Patient not taking: Reported on 7/29/2019) 30 tablet 2    oxyCODONE-acetaminophen (PERCOCET) 5-325 mg per tablet Take 1 tablet by mouth every 4 (four) hours as needed for moderate painMax Daily Amount: 6 tablets (Patient not taking: Reported on 8/6/2019) 30 tablet 0     No current facility-administered medications on file prior to visit  Allergies   Allergen Reactions    Bactrim [Sulfamethoxazole-Trimethoprim] Hives    Cefuroxime     Ciprofloxacin     Fiorinal  [Butalbital-Aspirin-Caffeine]     Pregabalin     Sulfa Antibiotics Rash           Review of Systems   Constitutional: Negative  HENT: Positive for congestion  Inability to breath through right side of her nose   Eyes: Negative  Respiratory: Negative  Cardiovascular: Negative  Gastrointestinal: Negative  Endocrine: Negative  Genitourinary: Negative  Musculoskeletal: Negative  Skin: Negative  Neurological: Positive for headaches  /70   Pulse 60   Ht 5' 3" (1 6 m)   Wt 56 7 kg (125 lb)   BMI 22 14 kg/m²       PHYSICAL  EXAMINATION    CONSTITUTION:    Appears appropriate for age  No evidence of any acute distress  Communicates normally  Voice quality is clear      Alert and oriented  HEAD/FACE:    Atraumatic, normocephalic on inspection  No scars present  Salivary glands are normal in texture and size without any asymmetry  Facial nerve function is symmetric and normal     EYES:    Extraocular muscles intact in both eyes, normal gaze bilaterally and no evidence of nystagmus  Pupils equal, round, and accommodate to light bilaterally  EARS:    External ears normal     External canals are clear and dry  Tympanic membranes intact with normal mobility, no effusion, no retraction, no perforation  Central monolayer noted bilaterally with peripheral tympanosclerosis noted on the left  Post auricular area is normal    NOSE:    External nose without deformity  Internal mucosa pink and moist     Septum deviated right  Inferior nasal turbinates Edematous  bilaterally    ORAL CAVITY:    Lips normal and healthy in appearance  Partial upper denture removed, remaining upper/lower dentition in good repair  Gums healthy, pink and moist     Tongue appears pink and moist with no lesions  Floor of mouth pink, moist, and smooth  Submandibular ducts patent with clear saliva  Parotid ducts patent with clear saliva  OROPHARYNX:    Soft palate pink and moist without any lesions  Uvula midline without any lesions  Tonsils grade 1 bilaterally  Posterior pharynx pink and moist with mild lymphoid studding    NECK:    Supple and symmetric  No masses noted  Trachea midline  No thyromegaly or nodules noted  LYMPH:    No palpable adenopathy in left or right neck    SKIN:    No rashes  No lesions noted       Lungs:  Clear to auscultation bilaterally; no rales, rhonchi or wheezing in all lung fields    CV:  Regular rate and rhythm

## 2019-08-26 ENCOUNTER — HOSPITAL ENCOUNTER (OUTPATIENT)
Dept: CT IMAGING | Facility: HOSPITAL | Age: 59
Discharge: HOME | End: 2019-08-26
Payer: COMMERCIAL

## 2019-08-26 ENCOUNTER — OFFICE VISIT (OUTPATIENT)
Dept: FAMILY MEDICINE CLINIC | Facility: CLINIC | Age: 59
End: 2019-08-26
Payer: COMMERCIAL

## 2019-08-26 VITALS
RESPIRATION RATE: 16 BRPM | DIASTOLIC BLOOD PRESSURE: 70 MMHG | TEMPERATURE: 98.6 F | HEIGHT: 63 IN | WEIGHT: 123 LBS | SYSTOLIC BLOOD PRESSURE: 106 MMHG | HEART RATE: 60 BPM | BODY MASS INDEX: 21.79 KG/M2

## 2019-08-26 DIAGNOSIS — Z01.818 PREOP GENERAL PHYSICAL EXAM: ICD-10-CM

## 2019-08-26 DIAGNOSIS — J32.3 CHRONIC SPHENOIDAL SINUSITIS: ICD-10-CM

## 2019-08-26 DIAGNOSIS — J32.0 CHRONIC MAXILLARY SINUSITIS: ICD-10-CM

## 2019-08-26 DIAGNOSIS — Z01.818 PRE-OP TESTING: ICD-10-CM

## 2019-08-26 DIAGNOSIS — Z12.39 BREAST CANCER SCREENING: Primary | ICD-10-CM

## 2019-08-26 DIAGNOSIS — J34.2 DEVIATED SEPTUM: ICD-10-CM

## 2019-08-26 PROCEDURE — 70486 CT MAXILLOFACIAL W/O DYE: CPT

## 2019-08-26 PROCEDURE — 1036F TOBACCO NON-USER: CPT | Performed by: FAMILY MEDICINE

## 2019-08-26 PROCEDURE — 93000 ELECTROCARDIOGRAM COMPLETE: CPT | Performed by: FAMILY MEDICINE

## 2019-08-26 PROCEDURE — 99214 OFFICE O/P EST MOD 30 MIN: CPT | Performed by: FAMILY MEDICINE

## 2019-08-26 PROCEDURE — 3008F BODY MASS INDEX DOCD: CPT | Performed by: FAMILY MEDICINE

## 2019-08-26 NOTE — PROGRESS NOTES
Assessment and Plan:  Clearance is pending the results of the labs  She will get her labs done this week  Problem List Items Addressed This Visit        Respiratory    Chronic maxillary sinusitis     Patient to have sinus surgery and a septoplasty done on September 11th by Dr Elsy Saldana  Deviated septum       Other    Preop general physical exam     Her exam was within normal limits  Her blood pressure was 106/70 and her temperature was 98 6°  Her weight was the same at 123 lb  Pre-op testing    Relevant Orders    POCT ECG (Completed)    Breast cancer screening - Primary     Patient was given a requisition to get a mammogram          Relevant Orders    Mammo screening bilateral w 3d & cad                 Diagnoses and all orders for this visit:    Breast cancer screening  -     Mammo screening bilateral w 3d & cad; Future    Preop general physical exam    Pre-op testing  -     POCT ECG    Deviated septum    Chronic maxillary sinusitis              Subjective:      Patient ID: Diego Valdovinos is a 61 y o  female  CC:    Chief Complaint   Patient presents with    Pre-op Exam     Pre op exam for sceptoplasty - scheduled 11 Sept 2019 Dr Chanelle Ku  EKG perfomed today  Pt has orders for [facility]       HPI:    This is a 28-year-old female who comes in for a preop physical exam for a deviated nasal septum and for chronic sinusitis  Her surgery is going to be on September 11th by Dr Elsy Saldana  She has been having headaches due to chronic sinusitis and is anticipating the surgery to give her relief  Her pain threshold is currently 4/10 because of the daily headaches associated with the sinus problem  Her blood pressure is 106/70 and her temperature is 98 6°  Her weight is 123 lb the same as last time and her BMI is 21 7  Her EKG was done today and was read by Dr Lu Pallas as within normal limits  She has requisitions to get lab work done which she will do this week        The following portions of the patient's history were reviewed and updated as appropriate: allergies, current medications, past family history, past medical history, past social history, past surgical history and problem list       Review of Systems   Constitutional: Negative  HENT: Positive for congestion, sinus pressure and sinus pain  Negative for ear discharge, ear pain, facial swelling, postnasal drip, rhinorrhea, sore throat, tinnitus, trouble swallowing and voice change  Eyes: Negative  Respiratory: Negative  Cardiovascular: Negative  Gastrointestinal: Negative  Endocrine: Negative  Genitourinary: Negative  Musculoskeletal: Negative  Skin: Negative  Allergic/Immunologic: Negative  Neurological: Positive for headaches  Negative for dizziness, tremors, seizures, syncope, facial asymmetry, speech difficulty, weakness, light-headedness and numbness  Hematological: Negative  Psychiatric/Behavioral: Negative  Data to review:       Objective:    Vitals:    08/26/19 1339   BP: 106/70   BP Location: Left arm   Patient Position: Sitting   Cuff Size: Standard   Pulse: 60   Resp: 16   Temp: 98 6 °F (37 °C)   TempSrc: Oral   Weight: 55 8 kg (123 lb)   Height: 5' 3" (1 6 m)        Physical Exam   Constitutional: She is oriented to person, place, and time  She appears well-developed and well-nourished  HENT:   Head: Normocephalic  Right Ear: External ear normal    Left Ear: External ear normal    Mouth/Throat: Oropharynx is clear and moist    Eyes: Pupils are equal, round, and reactive to light  Conjunctivae and EOM are normal    Neck: Normal range of motion  Neck supple  Cardiovascular: Normal rate, regular rhythm and normal heart sounds  Pulmonary/Chest: Effort normal and breath sounds normal    Abdominal: Soft  Bowel sounds are normal    Musculoskeletal: Normal range of motion  Neurological: She is alert and oriented to person, place, and time  Skin: Skin is warm  Psychiatric: She has a normal mood and affect  Her behavior is normal  Judgment and thought content normal    Nursing note and vitals reviewed

## 2019-08-26 NOTE — ASSESSMENT & PLAN NOTE
Her exam was within normal limits  Her blood pressure was 106/70 and her temperature was 98 6°  Her weight was the same at 123 lb

## 2019-08-27 PROBLEM — J34.2 DEVIATED NASAL SEPTUM: Status: ACTIVE | Noted: 2019-08-27

## 2019-08-27 PROBLEM — Z01.818 PREOPERATIVE TESTING: Status: ACTIVE | Noted: 2019-08-27

## 2019-08-27 PROBLEM — Z01.812 PRE-OPERATIVE LABORATORY EXAMINATION: Status: ACTIVE | Noted: 2019-08-27

## 2019-08-27 PROBLEM — J32.3 CHRONIC SPHENOIDAL SINUSITIS: Status: ACTIVE | Noted: 2019-08-27

## 2019-08-30 ENCOUNTER — TRANSCRIBE ORDERS (OUTPATIENT)
Dept: LAB | Facility: CLINIC | Age: 59
End: 2019-08-30

## 2019-08-30 ENCOUNTER — HOSPITAL ENCOUNTER (OUTPATIENT)
Dept: RADIOLOGY | Facility: HOSPITAL | Age: 59
Discharge: HOME | End: 2019-08-30
Payer: COMMERCIAL

## 2019-08-30 ENCOUNTER — APPOINTMENT (OUTPATIENT)
Dept: LAB | Facility: CLINIC | Age: 59
End: 2019-08-30
Payer: COMMERCIAL

## 2019-08-30 ENCOUNTER — TELEPHONE (OUTPATIENT)
Dept: FAMILY MEDICINE CLINIC | Facility: CLINIC | Age: 59
End: 2019-08-30

## 2019-08-30 DIAGNOSIS — J32.3 CHRONIC SPHENOIDAL SINUSITIS: Primary | ICD-10-CM

## 2019-08-30 DIAGNOSIS — Z01.812 PRE-OPERATIVE LABORATORY EXAMINATION: ICD-10-CM

## 2019-08-30 DIAGNOSIS — J34.2 DEVIATED NASAL SEPTUM: ICD-10-CM

## 2019-08-30 DIAGNOSIS — Z01.818 OTHER SPECIFIED PRE-OPERATIVE EXAMINATION: ICD-10-CM

## 2019-08-30 DIAGNOSIS — Z01.818 PREOPERATIVE TESTING: ICD-10-CM

## 2019-08-30 DIAGNOSIS — J32.8 OTHER CHRONIC SINUSITIS: ICD-10-CM

## 2019-08-30 DIAGNOSIS — J32.9 CHRONIC SINUSITIS, UNSPECIFIED LOCATION: ICD-10-CM

## 2019-08-30 DIAGNOSIS — J32.3 CHRONIC SPHENOIDAL SINUSITIS: ICD-10-CM

## 2019-08-30 LAB
ANION GAP SERPL CALCULATED.3IONS-SCNC: 3 MMOL/L (ref 4–13)
APTT PPP: 31 SECONDS (ref 23–37)
BASOPHILS # BLD AUTO: 0.01 THOUSANDS/ΜL (ref 0–0.1)
BASOPHILS NFR BLD AUTO: 0 % (ref 0–1)
BUN SERPL-MCNC: 19 MG/DL (ref 5–25)
CALCIUM SERPL-MCNC: 8.7 MG/DL (ref 8.3–10.1)
CHLORIDE SERPL-SCNC: 109 MMOL/L (ref 100–108)
CO2 SERPL-SCNC: 26 MMOL/L (ref 21–32)
CREAT SERPL-MCNC: 0.87 MG/DL (ref 0.6–1.3)
EOSINOPHIL # BLD AUTO: 0.09 THOUSAND/ΜL (ref 0–0.61)
EOSINOPHIL NFR BLD AUTO: 2 % (ref 0–6)
ERYTHROCYTE [DISTWIDTH] IN BLOOD BY AUTOMATED COUNT: 13 % (ref 11.6–15.1)
GFR SERPL CREATININE-BSD FRML MDRD: 73 ML/MIN/1.73SQ M
GLUCOSE SERPL-MCNC: 88 MG/DL (ref 65–140)
HCT VFR BLD AUTO: 41.8 % (ref 34.8–46.1)
HGB BLD-MCNC: 13.6 G/DL (ref 11.5–15.4)
IMM GRANULOCYTES # BLD AUTO: 0.01 THOUSAND/UL (ref 0–0.2)
IMM GRANULOCYTES NFR BLD AUTO: 0 % (ref 0–2)
INR PPP: 1.01 (ref 0.84–1.19)
LYMPHOCYTES # BLD AUTO: 2.09 THOUSANDS/ΜL (ref 0.6–4.47)
LYMPHOCYTES NFR BLD AUTO: 43 % (ref 14–44)
MCH RBC QN AUTO: 28.8 PG (ref 26.8–34.3)
MCHC RBC AUTO-ENTMCNC: 32.5 G/DL (ref 31.4–37.4)
MCV RBC AUTO: 88 FL (ref 82–98)
MONOCYTES # BLD AUTO: 0.47 THOUSAND/ΜL (ref 0.17–1.22)
MONOCYTES NFR BLD AUTO: 10 % (ref 4–12)
NEUTROPHILS # BLD AUTO: 2.23 THOUSANDS/ΜL (ref 1.85–7.62)
NEUTS SEG NFR BLD AUTO: 45 % (ref 43–75)
NRBC BLD AUTO-RTO: 0 /100 WBCS
PLATELET # BLD AUTO: 247 THOUSANDS/UL (ref 149–390)
PMV BLD AUTO: 9.2 FL (ref 8.9–12.7)
POTASSIUM SERPL-SCNC: 4.1 MMOL/L (ref 3.5–5.3)
PROTHROMBIN TIME: 12.9 SECONDS (ref 11.6–14.5)
RBC # BLD AUTO: 4.73 MILLION/UL (ref 3.81–5.12)
SODIUM SERPL-SCNC: 138 MMOL/L (ref 136–145)
WBC # BLD AUTO: 4.9 THOUSAND/UL (ref 4.31–10.16)

## 2019-08-30 PROCEDURE — 36415 COLL VENOUS BLD VENIPUNCTURE: CPT

## 2019-08-30 PROCEDURE — 85025 COMPLETE CBC W/AUTO DIFF WBC: CPT

## 2019-08-30 PROCEDURE — 85730 THROMBOPLASTIN TIME PARTIAL: CPT

## 2019-08-30 PROCEDURE — 85610 PROTHROMBIN TIME: CPT

## 2019-08-30 PROCEDURE — 80048 BASIC METABOLIC PNL TOTAL CA: CPT

## 2019-08-30 PROCEDURE — 71046 X-RAY EXAM CHEST 2 VIEWS: CPT

## 2019-08-30 NOTE — TELEPHONE ENCOUNTER
The patient's labs have returned and she is now cleared for surgery for her deviated nasal septum and her chronic sinusitis

## 2019-09-05 NOTE — PRE-PROCEDURE INSTRUCTIONS
Pre-Surgery Instructions:   Medication Instructions    ascorbic acid (VITAMIN C) 500 mg tablet Instructed patient per Anesthesia Guidelines   b complex vitamins capsule Instructed patient per Anesthesia Guidelines   buPROPion (WELLBUTRIN XL) 300 mg 24 hr tablet Instructed patient per Anesthesia Guidelines   calcium carbonate (OS-GABRIELLE) 600 MG tablet Instructed patient per Anesthesia Guidelines   cholecalciferol (VITAMIN D3) 1,000 units tablet Instructed patient per Anesthesia Guidelines   Evening Primrose Oil 500 MG CAPS Instructed patient per Anesthesia Guidelines   levothyroxine 25 mcg tablet Instructed patient per Anesthesia Guidelines   MAGNESIUM PO Instructed patient per Anesthesia Guidelines   metaxalone (SKELAXIN) 800 mg tablet Instructed patient per Anesthesia Guidelines  Instructed to take Levothyroxine and Wellbutrin with sip of water the morning of surgery per anesthesia guidelines  No aspirin, NSAIDs, vitamins, or supplements 1 week before surgery

## 2019-09-10 ENCOUNTER — ANESTHESIA EVENT (OUTPATIENT)
Dept: PERIOP | Facility: HOSPITAL | Age: 59
End: 2019-09-10
Payer: COMMERCIAL

## 2019-09-11 ENCOUNTER — HOSPITAL ENCOUNTER (OUTPATIENT)
Facility: HOSPITAL | Age: 59
Setting detail: OUTPATIENT SURGERY
Discharge: HOME | End: 2019-09-11
Attending: OTOLARYNGOLOGY | Admitting: OTOLARYNGOLOGY
Payer: COMMERCIAL

## 2019-09-11 ENCOUNTER — ANESTHESIA (OUTPATIENT)
Dept: PERIOP | Facility: HOSPITAL | Age: 59
End: 2019-09-11
Payer: COMMERCIAL

## 2019-09-11 VITALS
RESPIRATION RATE: 16 BRPM | DIASTOLIC BLOOD PRESSURE: 61 MMHG | TEMPERATURE: 97.5 F | WEIGHT: 125 LBS | OXYGEN SATURATION: 100 % | SYSTOLIC BLOOD PRESSURE: 133 MMHG | HEART RATE: 74 BPM | BODY MASS INDEX: 22.15 KG/M2 | HEIGHT: 63 IN

## 2019-09-11 DIAGNOSIS — Z01.812 PRE-OPERATIVE LABORATORY EXAMINATION: ICD-10-CM

## 2019-09-11 DIAGNOSIS — Z01.818 PREOPERATIVE TESTING: ICD-10-CM

## 2019-09-11 DIAGNOSIS — J32.3 CHRONIC SPHENOIDAL SINUSITIS: ICD-10-CM

## 2019-09-11 DIAGNOSIS — J32.9 CHRONIC SINUSITIS, UNSPECIFIED LOCATION: ICD-10-CM

## 2019-09-11 DIAGNOSIS — J34.2 DEVIATED NASAL SEPTUM: ICD-10-CM

## 2019-09-11 PROCEDURE — 88312 SPECIAL STAINS GROUP 1: CPT | Performed by: PATHOLOGY

## 2019-09-11 PROCEDURE — 30520 REPAIR OF NASAL SEPTUM: CPT | Performed by: OTOLARYNGOLOGY

## 2019-09-11 PROCEDURE — 31288 NASAL/SINUS ENDOSCOPY SURG: CPT | Performed by: OTOLARYNGOLOGY

## 2019-09-11 PROCEDURE — 88300 SURGICAL PATH GROSS: CPT | Performed by: PATHOLOGY

## 2019-09-11 PROCEDURE — 88304 TISSUE EXAM BY PATHOLOGIST: CPT | Performed by: PATHOLOGY

## 2019-09-11 PROCEDURE — 61782 SCAN PROC CRANIAL EXTRA: CPT | Performed by: OTOLARYNGOLOGY

## 2019-09-11 RX ORDER — OXYCODONE HYDROCHLORIDE AND ACETAMINOPHEN 5; 325 MG/1; MG/1
2 TABLET ORAL EVERY 4 HOURS PRN
Status: DISCONTINUED | OUTPATIENT
Start: 2019-09-11 | End: 2019-09-11 | Stop reason: HOSPADM

## 2019-09-11 RX ORDER — PROPOFOL 10 MG/ML
INJECTION, EMULSION INTRAVENOUS AS NEEDED
Status: DISCONTINUED | OUTPATIENT
Start: 2019-09-11 | End: 2019-09-11 | Stop reason: SURG

## 2019-09-11 RX ORDER — FENTANYL CITRATE 50 UG/ML
INJECTION, SOLUTION INTRAMUSCULAR; INTRAVENOUS AS NEEDED
Status: DISCONTINUED | OUTPATIENT
Start: 2019-09-11 | End: 2019-09-11 | Stop reason: SURG

## 2019-09-11 RX ORDER — ALBUTEROL SULFATE 2.5 MG/3ML
2.5 SOLUTION RESPIRATORY (INHALATION) ONCE AS NEEDED
Status: DISCONTINUED | OUTPATIENT
Start: 2019-09-11 | End: 2019-09-11 | Stop reason: HOSPADM

## 2019-09-11 RX ORDER — MAGNESIUM HYDROXIDE 1200 MG/15ML
LIQUID ORAL AS NEEDED
Status: DISCONTINUED | OUTPATIENT
Start: 2019-09-11 | End: 2019-09-11 | Stop reason: HOSPADM

## 2019-09-11 RX ORDER — SODIUM CHLORIDE 9 MG/ML
125 INJECTION, SOLUTION INTRAVENOUS CONTINUOUS
Status: DISCONTINUED | OUTPATIENT
Start: 2019-09-11 | End: 2019-09-11 | Stop reason: HOSPADM

## 2019-09-11 RX ORDER — MEPERIDINE HYDROCHLORIDE 50 MG/ML
12.5 INJECTION INTRAMUSCULAR; INTRAVENOUS; SUBCUTANEOUS AS NEEDED
Status: DISCONTINUED | OUTPATIENT
Start: 2019-09-11 | End: 2019-09-11 | Stop reason: HOSPADM

## 2019-09-11 RX ORDER — GINSENG 100 MG
CAPSULE ORAL AS NEEDED
Status: DISCONTINUED | OUTPATIENT
Start: 2019-09-11 | End: 2019-09-11 | Stop reason: HOSPADM

## 2019-09-11 RX ORDER — GLYCOPYRROLATE 0.2 MG/ML
INJECTION INTRAMUSCULAR; INTRAVENOUS AS NEEDED
Status: DISCONTINUED | OUTPATIENT
Start: 2019-09-11 | End: 2019-09-11 | Stop reason: SURG

## 2019-09-11 RX ORDER — EPHEDRINE SULFATE 50 MG/ML
INJECTION INTRAVENOUS AS NEEDED
Status: DISCONTINUED | OUTPATIENT
Start: 2019-09-11 | End: 2019-09-11 | Stop reason: SURG

## 2019-09-11 RX ORDER — DEXAMETHASONE SODIUM PHOSPHATE 10 MG/ML
INJECTION, SOLUTION INTRAMUSCULAR; INTRAVENOUS AS NEEDED
Status: DISCONTINUED | OUTPATIENT
Start: 2019-09-11 | End: 2019-09-11 | Stop reason: SURG

## 2019-09-11 RX ORDER — ONDANSETRON 2 MG/ML
INJECTION INTRAMUSCULAR; INTRAVENOUS AS NEEDED
Status: DISCONTINUED | OUTPATIENT
Start: 2019-09-11 | End: 2019-09-11 | Stop reason: SURG

## 2019-09-11 RX ORDER — ROCURONIUM BROMIDE 10 MG/ML
INJECTION, SOLUTION INTRAVENOUS AS NEEDED
Status: DISCONTINUED | OUTPATIENT
Start: 2019-09-11 | End: 2019-09-11 | Stop reason: SURG

## 2019-09-11 RX ORDER — LIDOCAINE HYDROCHLORIDE AND EPINEPHRINE 10; 10 MG/ML; UG/ML
INJECTION, SOLUTION INFILTRATION; PERINEURAL AS NEEDED
Status: DISCONTINUED | OUTPATIENT
Start: 2019-09-11 | End: 2019-09-11 | Stop reason: HOSPADM

## 2019-09-11 RX ORDER — ACETAMINOPHEN 325 MG/1
650 TABLET ORAL EVERY 4 HOURS PRN
Status: DISCONTINUED | OUTPATIENT
Start: 2019-09-11 | End: 2019-09-11 | Stop reason: HOSPADM

## 2019-09-11 RX ORDER — FENTANYL CITRATE/PF 50 MCG/ML
50 SYRINGE (ML) INJECTION
Status: DISCONTINUED | OUTPATIENT
Start: 2019-09-11 | End: 2019-09-11 | Stop reason: HOSPADM

## 2019-09-11 RX ORDER — NEOSTIGMINE METHYLSULFATE 1 MG/ML
INJECTION INTRAVENOUS AS NEEDED
Status: DISCONTINUED | OUTPATIENT
Start: 2019-09-11 | End: 2019-09-11 | Stop reason: SURG

## 2019-09-11 RX ORDER — PROPOFOL 10 MG/ML
INJECTION, EMULSION INTRAVENOUS CONTINUOUS PRN
Status: DISCONTINUED | OUTPATIENT
Start: 2019-09-11 | End: 2019-09-11 | Stop reason: SURG

## 2019-09-11 RX ORDER — DEXTROSE AND SODIUM CHLORIDE 5; .45 G/100ML; G/100ML
125 INJECTION, SOLUTION INTRAVENOUS CONTINUOUS
Status: DISCONTINUED | OUTPATIENT
Start: 2019-09-11 | End: 2019-09-11 | Stop reason: HOSPADM

## 2019-09-11 RX ORDER — ONDANSETRON 2 MG/ML
4 INJECTION INTRAMUSCULAR; INTRAVENOUS EVERY 6 HOURS PRN
Status: DISCONTINUED | OUTPATIENT
Start: 2019-09-11 | End: 2019-09-11 | Stop reason: HOSPADM

## 2019-09-11 RX ORDER — MIDAZOLAM HYDROCHLORIDE 1 MG/ML
INJECTION INTRAMUSCULAR; INTRAVENOUS AS NEEDED
Status: DISCONTINUED | OUTPATIENT
Start: 2019-09-11 | End: 2019-09-11 | Stop reason: SURG

## 2019-09-11 RX ORDER — LIDOCAINE HYDROCHLORIDE 10 MG/ML
INJECTION, SOLUTION INFILTRATION; PERINEURAL AS NEEDED
Status: DISCONTINUED | OUTPATIENT
Start: 2019-09-11 | End: 2019-09-11 | Stop reason: SURG

## 2019-09-11 RX ADMIN — LIDOCAINE HYDROCHLORIDE 50 MG: 10 INJECTION, SOLUTION INFILTRATION; PERINEURAL at 10:23

## 2019-09-11 RX ADMIN — FENTANYL CITRATE 100 MCG: 50 INJECTION, SOLUTION INTRAMUSCULAR; INTRAVENOUS at 10:22

## 2019-09-11 RX ADMIN — SODIUM CHLORIDE: 0.9 INJECTION, SOLUTION INTRAVENOUS at 10:49

## 2019-09-11 RX ADMIN — PROPOFOL 100 MCG/KG/MIN: 10 INJECTION, EMULSION INTRAVENOUS at 10:23

## 2019-09-11 RX ADMIN — REMIFENTANIL HYDROCHLORIDE 0.06 MCG/KG/MIN: 1 INJECTION, POWDER, LYOPHILIZED, FOR SOLUTION INTRAVENOUS at 10:23

## 2019-09-11 RX ADMIN — SODIUM CHLORIDE 125 ML/HR: 0.9 INJECTION, SOLUTION INTRAVENOUS at 09:09

## 2019-09-11 RX ADMIN — DEXAMETHASONE SODIUM PHOSPHATE 10 MG: 10 INJECTION, SOLUTION INTRAMUSCULAR; INTRAVENOUS at 10:28

## 2019-09-11 RX ADMIN — EPHEDRINE SULFATE 5 MG: 50 INJECTION, SOLUTION INTRAVENOUS at 11:21

## 2019-09-11 RX ADMIN — NEOSTIGMINE METHYLSULFATE 3 MG: 1 INJECTION, SOLUTION INTRAVENOUS at 11:55

## 2019-09-11 RX ADMIN — ACETAMINOPHEN 650 MG: 325 TABLET ORAL at 13:49

## 2019-09-11 RX ADMIN — GLYCOPYRROLATE 0.2 MG: 0.2 INJECTION INTRAMUSCULAR; INTRAVENOUS at 11:29

## 2019-09-11 RX ADMIN — PROPOFOL 150 MG: 10 INJECTION, EMULSION INTRAVENOUS at 10:23

## 2019-09-11 RX ADMIN — EPHEDRINE SULFATE 5 MG: 50 INJECTION, SOLUTION INTRAVENOUS at 11:03

## 2019-09-11 RX ADMIN — MIDAZOLAM 2 MG: 1 INJECTION INTRAMUSCULAR; INTRAVENOUS at 10:17

## 2019-09-11 RX ADMIN — EPHEDRINE SULFATE 5 MG: 50 INJECTION, SOLUTION INTRAVENOUS at 11:28

## 2019-09-11 RX ADMIN — EPHEDRINE SULFATE 5 MG: 50 INJECTION, SOLUTION INTRAVENOUS at 10:43

## 2019-09-11 RX ADMIN — ROCURONIUM BROMIDE 35 MG: 100 INJECTION, SOLUTION INTRAVENOUS at 10:23

## 2019-09-11 RX ADMIN — EPHEDRINE SULFATE 5 MG: 50 INJECTION, SOLUTION INTRAVENOUS at 11:50

## 2019-09-11 RX ADMIN — EPHEDRINE SULFATE 5 MG: 50 INJECTION, SOLUTION INTRAVENOUS at 11:25

## 2019-09-11 RX ADMIN — OXYCODONE HYDROCHLORIDE AND ACETAMINOPHEN 1 TABLET: 5; 325 TABLET ORAL at 13:49

## 2019-09-11 RX ADMIN — GLYCOPYRROLATE 0.4 MG: 0.2 INJECTION INTRAMUSCULAR; INTRAVENOUS at 11:55

## 2019-09-11 RX ADMIN — ONDANSETRON 4 MG: 2 INJECTION INTRAMUSCULAR; INTRAVENOUS at 11:45

## 2019-09-11 NOTE — INTERVAL H&P NOTE
H&P reviewed  After examining the patient I find no changes in the patients condition since the H&P had been written      Vitals:    09/11/19 0856   BP: 118/72   Pulse: 78   Resp: 16   Temp: 98 7 °F (37 1 °C)   SpO2: 99%

## 2019-09-11 NOTE — DISCHARGE INSTRUCTIONS
[facility]    POST OPERATIVE SINUS SURGERY INSTRUCTIONS      1  Change drip pad under the nose as needed for bleeding  2  Keep head of bed elevated  Sleep on at least a few pillows at night  3  Expect and do not become concerned about not being able to breathe through your nose  You will be a mouth-breather for approximately one week  4  You may cleanse crusting from the anterior portion of your nose with hydrogen peroxide and Q-tips  You may use Ocean nasal spray throughout the day to prevent crusting inside nasal cavity and splints  5  Begin using sinus rinse two times daily  If you have nasal splints in place you may start after the nasal splints are removed  6  Do not blow your nose until exactly 1 week after surgery  7  If you must sneeze, sneeze with mouth open  8  Avoid any strenuous activity, exercise, bending, or lifting, until approved by your surgeon  9  If there is significant bleeding, you may use over-the-counter Afrin  Place 2 sprays into the bleeding nostril every 5 minutes up to 3 consecutive times  If bleeding does not stop, call our office at [phone] or [phone] or go directly to the emergency room  10  If you have severe pain which is uncontrolled by medication, significant bleeding or a fever greater than 101°F, notify our office immediately at [phone] or [phone]  11   If you have a prescription for pain medication follow appropriate directions on label  If no prescription was given you may take over the counter pain medication as needed  12   If you have a prescription for steroids (Prednisone, Prednisolone) you may begin taking this medication the day following the surgical procedure  13   If you were instruction on using medicated rinses you may start those rinses the day following the surgical procedure  14   No smoking  Avoid second hand smoke exposure

## 2019-09-11 NOTE — ANESTHESIA PREPROCEDURE EVALUATION
Review of Systems/Medical History  Patient summary reviewed  Chart reviewed  No history of anesthetic complications     Cardiovascular  Hyperlipidemia,   Comment: ECG CONCLUSIONS: The stress ECG was negative for ischemia  There were no stress arrhythmias or conduction abnormalities      STRESS 2D ECHO RESULTS:     BASELINE: There were no regional wall motion abnormalities  Left ventricular size was normal  Overall left ventricular systolic function was normal  Estimated left ventricular ejection fraction was 65 %       PEAK STRESS: There were no regional wall motion abnormalities  There was an appropriate reduction in left ventricular size  There was an appropriate augmentation in LV function      ECHO CONCLUSIONS: There was no echocardiographic evidence for stress-induced ischemia  The image quality was good      SYSTEM MEASUREMENT TABLES     CW  TR Vmax: 2 3 m/s  TR maxP 7 mmHg     Prepared and electronically signed by  Kerrie Foote DO  Signed 2018 11:27:03,  Pulmonary  Negative pulmonary ROS        GI/Hepatic     Hiatal hernia,             Endo/Other  History of thyroid disease , hypothyroidism,      GYN       Hematology  Negative hematology ROS      Musculoskeletal  Negative musculoskeletal ROS Back pain , lumbar pain,        Neurology    Headaches, Fibromyalgia   Psychology   Anxiety, Depression ,              Physical Exam    Airway    Mallampati score: II  TM Distance: >3 FB  Neck ROM: full     Dental   No notable dental hx upper dentures,     Cardiovascular  Rhythm: regular, Rate: normal, Cardiovascular exam normal    Pulmonary  Pulmonary exam normal     Other Findings        Anesthesia Plan  ASA Score- 3     Anesthesia Type- general with ASA Monitors  Additional Monitors:   Airway Plan: ETT  Plan Factors-    Induction- intravenous  Postoperative Plan-     Informed Consent- Anesthetic plan and risks discussed with patient

## 2019-09-11 NOTE — OP NOTE
OPERATIVE REPORT  PATIENT NAME: Myesha Almanza    :  1960  MRN: [id number]  Pt Location: [facility] OR ROOM 04    SURGERY DATE: 2019    Surgeon(s) and Role:     Jenn Pulliam DO - Primary    Preop Diagnosis:  Chronic sphenoidal sinusitis [J32 3]  Chronic sinusitis, unspecified location [J32 9]  Deviated nasal septum [J34 2]  Pre-operative laboratory examination [Z01 812]  Preoperative testing [Z01 818]    Post-Op Diagnosis Codes:     * Chronic sphenoidal sinusitis [J32 3]     * Chronic sinusitis, unspecified location [J32 9]     * Deviated nasal septum [J34 2]     * Pre-operative laboratory examination [Z01 812]     * Preoperative testing [Z01 818]    Procedure(s) (LRB):  FUNCTIONAL ENDOSCOPIC SINUS SURGERY (FESS) IMAGED GUIDED (Left)  SEPTOPLASTY with Collumellar Strut Reconstruction (N/A)    Specimen(s):  ID Type Source Tests Collected by Time Destination   1 : left sinus contents Tissue Nasal/Sinus Polyps TISSUE EXAM Cheryle [place],  2019 1052    2 : septal cartilage and bone Tissue Nasal Septum TISSUE EXAM Cheryle [place],  2019 1052        Estimated Blood Loss:   Minimal    Drains:  * No LDAs found *    Anesthesia Type:   General    Operative Indications:  Chronic sphenoidal sinusitis [J32 3]  Chronic sinusitis, unspecified location [J32 9]  Deviated nasal septum [J34 2]  Pre-operative laboratory examination [Z01 812]  Preoperative testing [Z01 818]  Operative Findings:  Polyp obstructing left sphenoid face with fungal sinusitis filling left sphenoid  Deviated nasal septum on the right with obstruction    Complications:   None    Procedure and Technique:  After the patient was properly identified she was placed on the operating table in the usual supine position  Informed time-out was completed with the operative surgeon stating the procedure which was confirmed by Nursing and anesthesia with no contraindications to proceeding with surgery    General endotracheal anesthesia was initiated when deemed adequate the patient was rotated 90° to her right  The image guided head gear was placed on the patient, calibrated to the patient's preop films and noted to be functional   Cotton pledgets soaked in 4% cocaine solution were placed in the left and right nasal vault  After 5 minutes these were removed and the nasal septum as well as the left sphenoid face was injected with 8 mL of 1% lidocaine 1 to 130468 epinephrine  The left sphenoid face was injected utilizing 0 degree Alcantara lesia endoscopy  The septum was significantly deviated to the right  We began with the functional sinus surgery on the left using 0 degree Alcantara lesia endoscope and image guided equipment  The middle turbinate was gently lateralized so we could visualize the sphenoid face  A large polyp was noted on the sphenoid face and removed with image guided micro debrider  The inferior portion of the superior turbinate was resected using the micro debrider  Using image guidance the sphenoid face was infractured  Using a sphenoid punch as well as the micro debrider sphenoid ostia was opened widely  Fungal debris was filling the left sphenoid sinus which was carefully removed with suction as well as irrigation  The infection was completely removed with normal appearing mucosa noted in the left sphenoid sinus  A cotton pledget was placed against the sphenoid face and our attention was turned to the septum  Using a 15 blade scalpel I adriane-transfixion incision was created  Mucoperichondrial flaps were raised on the left and right side  A columellar strut approximately 2 cm in anterior-posterior direction was created using a D knife with a vertical cartilaginous incision  Posterior quadrangular cartilage was removed using a swivel knife  Floor spur as well as triangular spurring of the bony septum was noted to the right  Using Marguarite Lye forceps the bony spur was carefully resected    Using a chisel the floor spur was carefully resected  The columellar strut was scored since it was significantly bowed to the right  After scoring the cartilage was primarily straight  The columellar strut was secured to the periosteum over the anterior maxillary spine with 5 0 Monocryl whipstitchs'  The remaining quadrangular cartilage was trimmed of any of the deviated portion, then morselized and replaced between the mucoperichondrial flaps  The adriane-transfixion incision was closed with 4 0 chromic simple interrupted sutures  The cotton would pledget was removed from the left sphenoid face and 0 degree Alcantara lesia endoscopy was performed 1 last time to assure that there was no active bleeding and that the sinus was clear which it was  Four 0 plain gut on a Gene needle was used in the form of a whipstitch to reapproximate the mucoperichondrial flaps with the morselized cartilage centrally located  Nasal septum was noted to be midline and the nasal airway both right and left side were now widely patent  The nasopharynx was suction  Lainez splints coated in bacitracin ointment were placed in the left and right nasal vault and secure with a 2 0 nylon whipstitch  The oropharynx was suction  The image guided head gear was removed from the patient  The patient was rotated back to Anesthesia  General endotracheal anesthesia was discontinued  The patient was awakened without difficulty returned to the recovery room     I was present for the entire procedure    Patient Disposition:  PACU     SIGNATURE: Beatriz Contreras DO  DATE: September 11, 2019  TIME: 12:08 PM

## 2019-09-26 ENCOUNTER — OFFICE VISIT (OUTPATIENT)
Dept: FAMILY MEDICINE CLINIC | Facility: CLINIC | Age: 59
End: 2019-09-26
Payer: COMMERCIAL

## 2019-09-26 VITALS
DIASTOLIC BLOOD PRESSURE: 80 MMHG | BODY MASS INDEX: 21.72 KG/M2 | WEIGHT: 122.6 LBS | HEIGHT: 63 IN | HEART RATE: 72 BPM | SYSTOLIC BLOOD PRESSURE: 130 MMHG

## 2019-09-26 DIAGNOSIS — N39.0 URINARY TRACT INFECTION WITHOUT HEMATURIA, SITE UNSPECIFIED: Primary | ICD-10-CM

## 2019-09-26 DIAGNOSIS — J32.3 CHRONIC SPHENOIDAL SINUSITIS: ICD-10-CM

## 2019-09-26 DIAGNOSIS — E03.9 HYPOTHYROIDISM, UNSPECIFIED TYPE: ICD-10-CM

## 2019-09-26 DIAGNOSIS — R30.0 DYSURIA: ICD-10-CM

## 2019-09-26 LAB
SL AMB  POCT GLUCOSE, UA: NORMAL
SL AMB LEUKOCYTE ESTERASE,UA: NORMAL
SL AMB POCT BILIRUBIN,UA: NORMAL
SL AMB POCT BLOOD,UA: NORMAL
SL AMB POCT CLARITY,UA: CLEAR
SL AMB POCT COLOR,UA: YELLOW
SL AMB POCT KETONES,UA: NORMAL
SL AMB POCT NITRITE,UA: NORMAL
SL AMB POCT PH,UA: 6.5
SL AMB POCT SPECIFIC GRAVITY,UA: 1.02
SL AMB POCT URINE PROTEIN: NORMAL
SL AMB POCT UROBILINOGEN: 0.2

## 2019-09-26 PROCEDURE — 99214 OFFICE O/P EST MOD 30 MIN: CPT | Performed by: PHYSICIAN ASSISTANT

## 2019-09-26 PROCEDURE — 3008F BODY MASS INDEX DOCD: CPT | Performed by: PHYSICIAN ASSISTANT

## 2019-09-26 PROCEDURE — 87086 URINE CULTURE/COLONY COUNT: CPT | Performed by: PHYSICIAN ASSISTANT

## 2019-09-26 PROCEDURE — 81002 URINALYSIS NONAUTO W/O SCOPE: CPT | Performed by: PHYSICIAN ASSISTANT

## 2019-09-26 RX ORDER — NITROFURANTOIN 25; 75 MG/1; MG/1
100 CAPSULE ORAL 2 TIMES DAILY
Qty: 10 CAPSULE | Refills: 0 | Status: SHIPPED | OUTPATIENT
Start: 2019-09-26 | End: 2019-10-01

## 2019-09-26 NOTE — PATIENT INSTRUCTIONS
1  Urinary tract infection-recommend Macrobid 100 mg twice daily for 5 days  Patient encouraged to drink plenty of fluids  Follow-up in the next 4-5 days if symptoms are not improving or sooner if symptoms would worsen  Urinalysis will be sent for culture and sensitivity  2  Chronic sphenoid sinusitis-patient status post sinus surgery under general anesthesia  May have had urinary catheter placed  3  Hyperlipidemia-presently stable, no medication changes

## 2019-09-26 NOTE — PROGRESS NOTES
Assessment and Plan:  Patient Instructions   1  Urinary tract infection-recommend Macrobid 100 mg twice daily for 5 days  Patient encouraged to drink plenty of fluids  Follow-up in the next 4-5 days if symptoms are not improving or sooner if symptoms would worsen  Urinalysis will be sent for culture and sensitivity  2  Chronic sphenoid sinusitis-patient status post sinus surgery under general anesthesia  May have had urinary catheter placed  3  Hyperlipidemia-presently stable, no medication changes  Diagnoses and all orders for this visit:    Urinary tract infection without hematuria, site unspecified    Dysuria  -     POCT urine dip  -     Urine culture; Future  -     Urine culture  -     nitrofurantoin (MACROBID) 100 mg capsule; Take 1 capsule (100 mg total) by mouth 2 (two) times a day for 5 days    Hypothyroidism, unspecified type    Chronic sphenoidal sinusitis              Subjective:      Patient ID: Alfredito Rowley is a 61 y o  female  CC:    Chief Complaint   Patient presents with    Difficulty Urinating     symptoms began this morning~cd       HPI:    HPI:  This is a 71-year-old female who presents to the office with concern over some discomfort and burning upon urination which started earlier this morning  She has not had any fevers, chills, or flank pain  She has had previous urinary tract infections which have started with similar symptoms  Patient states that she recently did have sinus surgery under general anesthesia and may have had a catheter placed  The following portions of the patient's history were reviewed and updated as appropriate: allergies, current medications, past family history, past medical history, past social history, past surgical history and problem list       Review of Systems   Constitutional: Negative for chills, fatigue and fever  HENT: Negative for congestion, ear pain and sinus pressure  Eyes: Negative for visual disturbance     Respiratory: Negative for cough, chest tightness and shortness of breath  Cardiovascular: Negative for chest pain and palpitations  Gastrointestinal: Negative for diarrhea, nausea and vomiting  Endocrine: Negative for polyuria  Genitourinary: Negative for dysuria and frequency  Musculoskeletal: Negative for arthralgias and myalgias  Skin: Negative for pallor and rash  Neurological: Negative for dizziness, weakness, light-headedness, numbness and headaches  Psychiatric/Behavioral: Negative for agitation, behavioral problems and sleep disturbance  All other systems reviewed and are negative  Data to review:       Objective:    Vitals:    09/26/19 1540   BP: 130/80   BP Location: Left arm   Patient Position: Sitting   Cuff Size: Standard   Pulse: 72   Weight: 55 6 kg (122 lb 9 6 oz)   Height: 5' 3" (1 6 m)        Physical Exam   Constitutional: She is oriented to person, place, and time  She appears well-developed and well-nourished  No distress  HENT:   Head: Normocephalic and atraumatic  Right Ear: External ear normal    Left Ear: External ear normal    Nose: Nose normal    Mouth/Throat: Oropharynx is clear and moist  No oropharyngeal exudate  Eyes: Pupils are equal, round, and reactive to light  Conjunctivae and EOM are normal    Neck: Normal range of motion  Neck supple  No tracheal deviation present  No thyromegaly present  Cardiovascular: Normal rate, regular rhythm and normal heart sounds  Exam reveals no friction rub  No murmur heard  Pulmonary/Chest: Effort normal and breath sounds normal  No respiratory distress  She has no wheezes  She has no rales  Abdominal: Soft  Bowel sounds are normal  She exhibits no distension  There is no tenderness  There is no rebound and no guarding  No CVA tenderness of either side  Musculoskeletal: Normal range of motion  She exhibits no edema or tenderness  Lymphadenopathy:     She has no cervical adenopathy     Neurological: She is alert and oriented to person, place, and time  No cranial nerve deficit  Coordination normal    Skin: Skin is warm and dry  No rash noted  No erythema  Psychiatric: She has a normal mood and affect  Her behavior is normal  Thought content normal    Nursing note and vitals reviewed

## 2019-09-27 LAB — BACTERIA UR CULT: NORMAL

## 2019-10-03 DIAGNOSIS — E03.9 ACQUIRED HYPOTHYROIDISM: ICD-10-CM

## 2019-10-03 RX ORDER — LEVOTHYROXINE SODIUM 0.03 MG/1
TABLET ORAL
Qty: 90 TABLET | Refills: 0 | Status: SHIPPED | OUTPATIENT
Start: 2019-10-03 | End: 2020-01-09

## 2020-01-09 DIAGNOSIS — E03.9 ACQUIRED HYPOTHYROIDISM: ICD-10-CM

## 2020-01-09 RX ORDER — LEVOTHYROXINE SODIUM 0.03 MG/1
TABLET ORAL
Qty: 90 TABLET | Refills: 0 | Status: SHIPPED | OUTPATIENT
Start: 2020-01-09 | End: 2020-04-05

## 2020-01-21 ENCOUNTER — OFFICE VISIT (OUTPATIENT)
Dept: FAMILY MEDICINE CLINIC | Facility: CLINIC | Age: 60
End: 2020-01-21
Payer: COMMERCIAL

## 2020-01-21 VITALS
RESPIRATION RATE: 14 BRPM | HEIGHT: 63 IN | DIASTOLIC BLOOD PRESSURE: 80 MMHG | TEMPERATURE: 98 F | WEIGHT: 125 LBS | BODY MASS INDEX: 22.15 KG/M2 | HEART RATE: 70 BPM | SYSTOLIC BLOOD PRESSURE: 130 MMHG

## 2020-01-21 DIAGNOSIS — R14.0 BLOATING: ICD-10-CM

## 2020-01-21 DIAGNOSIS — E78.2 ELEVATED TRIGLYCERIDES WITH HIGH CHOLESTEROL: ICD-10-CM

## 2020-01-21 DIAGNOSIS — M67.40 GANGLION CYST: ICD-10-CM

## 2020-01-21 DIAGNOSIS — R79.89 ABNORMAL TSH: ICD-10-CM

## 2020-01-21 DIAGNOSIS — R10.9 ABDOMINAL PAIN, UNSPECIFIED ABDOMINAL LOCATION: Primary | ICD-10-CM

## 2020-01-21 PROCEDURE — 1036F TOBACCO NON-USER: CPT | Performed by: PHYSICIAN ASSISTANT

## 2020-01-21 PROCEDURE — 99214 OFFICE O/P EST MOD 30 MIN: CPT | Performed by: PHYSICIAN ASSISTANT

## 2020-01-21 PROCEDURE — 3008F BODY MASS INDEX DOCD: CPT | Performed by: PHYSICIAN ASSISTANT

## 2020-01-21 RX ORDER — LIFITEGRAST 50 MG/ML
SOLUTION/ DROPS OPHTHALMIC
COMMUNITY
Start: 2019-11-18 | End: 2020-06-01 | Stop reason: ALTCHOICE

## 2020-01-21 RX ORDER — DICYCLOMINE HYDROCHLORIDE 10 MG/1
10 CAPSULE ORAL
Qty: 30 CAPSULE | Refills: 0 | Status: SHIPPED | OUTPATIENT
Start: 2020-01-21 | End: 2020-06-01 | Stop reason: ALTCHOICE

## 2020-01-21 NOTE — PROGRESS NOTES
Assessment and Plan:  Patient Instructions   1  Abdominal pain-patient was some generalized discomfort for about 4 days  Recommend assessing ultrasound of the abdomen  There is some family history of stomach cancer and I would also like to assess gallbladder for any stones  Patient will have labs including celiac panel and lipase  2  Bloating-patient will be started on Bentyl as needed for symptoms  Further diagnostic studies as above  3  Hair loss-recommend assessing TSH  Patient has had previously abnormal TSH  4  Ganglion cyst of the right wrist-recommend evaluation by hand specialist as patient is complaining of some pain and discomfort with it  5  History of elevated triglycerides-will reassess lipid panel  Follow up with results as necessary  Diagnoses and all orders for this visit:    Abdominal pain, unspecified abdominal location  -     Lipid Panel with Direct LDL reflex  -     Comprehensive metabolic panel  -     TSH, 3rd generation with Free T4 reflex  -     Celiac Disease Antibody Profile; Future  -     Lipase; Future  -     US abdomen complete; Future    Bloating  -     Lipid Panel with Direct LDL reflex  -     Comprehensive metabolic panel  -     TSH, 3rd generation with Free T4 reflex  -     Celiac Disease Antibody Profile; Future  -     Lipase; Future  -     US abdomen complete; Future  -     dicyclomine (BENTYL) 10 mg capsule; Take 1 capsule (10 mg total) by mouth 4 (four) times a day (before meals and at bedtime)    Abnormal TSH  -     Lipid Panel with Direct LDL reflex  -     Comprehensive metabolic panel  -     TSH, 3rd generation with Free T4 reflex  -     Celiac Disease Antibody Profile; Future  -     Lipase; Future    Ganglion cyst  -     Ambulatory referral to Orthopedic Surgery; Future  -     Lipid Panel with Direct LDL reflex  -     Comprehensive metabolic panel  -     TSH, 3rd generation with Free T4 reflex  -     Celiac Disease Antibody Profile;  Future  -     Lipase; Future    Elevated triglycerides with high cholesterol  -     Lipid Panel with Direct LDL reflex              Subjective:      Patient ID: Jayant Avalos is a 61 y o  female  CC:    Chief Complaint   Patient presents with   Martita Lopes     ongoing for awhile now but states last four days have gotten worst     Abdominal Pain    Constipation       HPI:    HPI:  This is a 70-year-old female who presents to the office with concerns of some abdominal discomfort and bloating sensation after meals for the past 4 days  Symptoms have been worse than usual   She is still having fairly normal bowel movements  She does have a little bit of constipation from time to time  She is concerned as there is a family history of abdominal cancers and she would like to rule out any masses  She has not had any previous history of gallbladder abnormalities  If anything her most discomfort is on the left side of the abdomen however  She does not have any point tenderness however it feels more generalized  Patient states it definitely is related to foods but not any specific food  It seems to get worse after she eats anything about 15 minutes later  She also has a history of elevated triglycerides and would like to have that reassessed  Furthermore she mentions that she has had some hair loss  Upon further examination of her chart she did have an abnormal TSH in April 2019  She also complains that she has a lump on the back of her right wrist   It has been painful and bothersome from time to time  She would like to have it looked at by a specialist         The following portions of the patient's history were reviewed and updated as appropriate: allergies, current medications, past family history, past medical history, past social history, past surgical history and problem list       Review of Systems   Constitutional: Negative for chills, fatigue and fever  HENT: Negative for congestion, ear pain and sinus pressure      Eyes: Negative for visual disturbance  Respiratory: Negative for cough, chest tightness and shortness of breath  Cardiovascular: Negative for chest pain and palpitations  Gastrointestinal: Positive for abdominal distention, abdominal pain and constipation  Negative for diarrhea, nausea and vomiting  Abdominal bloating   Endocrine: Negative for polyuria  Genitourinary: Negative for dysuria and frequency  Musculoskeletal: Negative for arthralgias and myalgias  Lump of the posterior right wrist   Skin: Negative for pallor and rash  Neurological: Negative for dizziness, weakness, light-headedness, numbness and headaches  Psychiatric/Behavioral: Negative for agitation, behavioral problems and sleep disturbance  All other systems reviewed and are negative  Data to review:       Objective:    Vitals:    01/21/20 1609   BP: 130/80   BP Location: Left arm   Patient Position: Sitting   Cuff Size: Adult   Pulse: 70   Resp: 14   Temp: 98 °F (36 7 °C)   TempSrc: Tympanic   Weight: 56 7 kg (125 lb)   Height: 5' 3" (1 6 m)        Physical Exam   Constitutional: She is oriented to person, place, and time  She appears well-developed and well-nourished  No distress  HENT:   Head: Normocephalic and atraumatic  Right Ear: External ear normal    Left Ear: External ear normal    Nose: Nose normal    Mouth/Throat: Oropharynx is clear and moist  No oropharyngeal exudate  Eyes: Pupils are equal, round, and reactive to light  Conjunctivae and EOM are normal    Neck: Normal range of motion  Neck supple  No tracheal deviation present  No thyromegaly present  Cardiovascular: Normal rate, regular rhythm and normal heart sounds  Exam reveals no friction rub  No murmur heard  Pulmonary/Chest: Effort normal and breath sounds normal  No respiratory distress  She has no wheezes  She has no rales  Abdominal: Soft  Bowel sounds are normal  She exhibits no distension  There is tenderness   There is no rebound and no guarding  There is generalized abdominal tenderness without rebound, guarding, or rigidity  Bowel sounds are normoactive x4  Musculoskeletal: Normal range of motion  She exhibits no edema or tenderness  Right dorsal wrist with nodule which is evident upon flexion of the wrist   It is approximately 0 8 cm, elevated, fixed, nonmobile  Consistent with dorsal ganglion  Lymphadenopathy:     She has no cervical adenopathy  Neurological: She is alert and oriented to person, place, and time  No cranial nerve deficit  Coordination normal    Skin: Skin is warm and dry  No rash noted  No erythema  Psychiatric: She has a normal mood and affect  Her behavior is normal  Thought content normal    Nursing note and vitals reviewed

## 2020-01-21 NOTE — PATIENT INSTRUCTIONS
1   Abdominal pain-patient was some generalized discomfort for about 4 days  Recommend assessing ultrasound of the abdomen  There is some family history of stomach cancer and I would also like to assess gallbladder for any stones  Patient will have labs including celiac panel and lipase  2  Bloating-patient will be started on Bentyl as needed for symptoms  Further diagnostic studies as above  3  Hair loss-recommend assessing TSH  Patient has had previously abnormal TSH  4  Ganglion cyst of the right wrist-recommend evaluation by hand specialist as patient is complaining of some pain and discomfort with it  5  History of elevated triglycerides-will reassess lipid panel  Follow up with results as necessary

## 2020-01-22 ENCOUNTER — HOSPITAL ENCOUNTER (OUTPATIENT)
Dept: ULTRASOUND IMAGING | Facility: HOSPITAL | Age: 60
Discharge: HOME | End: 2020-01-22
Payer: COMMERCIAL

## 2020-01-22 DIAGNOSIS — R10.9 ABDOMINAL PAIN, UNSPECIFIED ABDOMINAL LOCATION: ICD-10-CM

## 2020-01-22 DIAGNOSIS — R14.0 BLOATING: ICD-10-CM

## 2020-01-22 PROCEDURE — 76700 US EXAM ABDOM COMPLETE: CPT

## 2020-01-23 ENCOUNTER — TELEPHONE (OUTPATIENT)
Dept: FAMILY MEDICINE CLINIC | Facility: CLINIC | Age: 60
End: 2020-01-23

## 2020-01-23 DIAGNOSIS — R14.0 BLOATING: Primary | ICD-10-CM

## 2020-01-23 NOTE — TELEPHONE ENCOUNTER
Ultrasound does not show any suspicious masses of the abdomen however there are gallstones and some gallbladder thickening present    Would recommend HIDA scan to better assess gallbladder function

## 2020-01-23 NOTE — TELEPHONE ENCOUNTER
Pt notified of ultrasound results  She will call scheduling to arrange for HIDA scan  She will call us back if she has any difficulty scheduling

## 2020-01-24 ENCOUNTER — APPOINTMENT (OUTPATIENT)
Dept: LAB | Facility: CLINIC | Age: 60
End: 2020-01-24
Payer: COMMERCIAL

## 2020-01-24 DIAGNOSIS — R14.0 BLOATING: ICD-10-CM

## 2020-01-24 DIAGNOSIS — R79.89 ABNORMAL TSH: ICD-10-CM

## 2020-01-24 DIAGNOSIS — R10.9 ABDOMINAL PAIN, UNSPECIFIED ABDOMINAL LOCATION: ICD-10-CM

## 2020-01-24 DIAGNOSIS — M67.40 GANGLION CYST: ICD-10-CM

## 2020-01-24 LAB
ALBUMIN SERPL BCP-MCNC: 4.1 G/DL (ref 3.5–5)
ALP SERPL-CCNC: 66 U/L (ref 46–116)
ALT SERPL W P-5'-P-CCNC: 34 U/L (ref 12–78)
ANION GAP SERPL CALCULATED.3IONS-SCNC: 3 MMOL/L (ref 4–13)
AST SERPL W P-5'-P-CCNC: 17 U/L (ref 5–45)
BILIRUB SERPL-MCNC: 0.55 MG/DL (ref 0.2–1)
BUN SERPL-MCNC: 17 MG/DL (ref 5–25)
CALCIUM SERPL-MCNC: 9.1 MG/DL (ref 8.3–10.1)
CHLORIDE SERPL-SCNC: 108 MMOL/L (ref 100–108)
CHOLEST SERPL-MCNC: 231 MG/DL (ref 50–200)
CO2 SERPL-SCNC: 28 MMOL/L (ref 21–32)
CREAT SERPL-MCNC: 0.85 MG/DL (ref 0.6–1.3)
GFR SERPL CREATININE-BSD FRML MDRD: 75 ML/MIN/1.73SQ M
GLUCOSE P FAST SERPL-MCNC: 85 MG/DL (ref 65–99)
HDLC SERPL-MCNC: 45 MG/DL
LDLC SERPL CALC-MCNC: 159 MG/DL (ref 0–100)
LIPASE SERPL-CCNC: 83 U/L (ref 73–393)
POTASSIUM SERPL-SCNC: 3.8 MMOL/L (ref 3.5–5.3)
PROT SERPL-MCNC: 7.3 G/DL (ref 6.4–8.2)
SODIUM SERPL-SCNC: 139 MMOL/L (ref 136–145)
TRIGL SERPL-MCNC: 136 MG/DL
TSH SERPL DL<=0.05 MIU/L-ACNC: 3.6 UIU/ML (ref 0.36–3.74)

## 2020-01-24 PROCEDURE — 36415 COLL VENOUS BLD VENIPUNCTURE: CPT | Performed by: PHYSICIAN ASSISTANT

## 2020-01-24 PROCEDURE — 86255 FLUORESCENT ANTIBODY SCREEN: CPT

## 2020-01-24 PROCEDURE — 82784 ASSAY IGA/IGD/IGG/IGM EACH: CPT

## 2020-01-24 PROCEDURE — 83690 ASSAY OF LIPASE: CPT

## 2020-01-24 PROCEDURE — 80061 LIPID PANEL: CPT | Performed by: PHYSICIAN ASSISTANT

## 2020-01-24 PROCEDURE — 83516 IMMUNOASSAY NONANTIBODY: CPT

## 2020-01-24 PROCEDURE — 84443 ASSAY THYROID STIM HORMONE: CPT | Performed by: PHYSICIAN ASSISTANT

## 2020-01-24 PROCEDURE — 80053 COMPREHEN METABOLIC PANEL: CPT | Performed by: PHYSICIAN ASSISTANT

## 2020-01-25 LAB
ENDOMYSIUM IGA SER QL: NEGATIVE
GLIADIN PEPTIDE IGA SER-ACNC: 3 UNITS (ref 0–19)
GLIADIN PEPTIDE IGG SER-ACNC: 2 UNITS (ref 0–19)
IGA SERPL-MCNC: 102 MG/DL (ref 87–352)
TTG IGA SER-ACNC: <2 U/ML (ref 0–3)
TTG IGG SER-ACNC: <2 U/ML (ref 0–5)

## 2020-01-28 ENCOUNTER — HOSPITAL ENCOUNTER (OUTPATIENT)
Dept: NUCLEAR MEDICINE | Facility: HOSPITAL | Age: 60
Discharge: HOME | End: 2020-01-28
Payer: COMMERCIAL

## 2020-01-28 DIAGNOSIS — R14.0 BLOATING: ICD-10-CM

## 2020-01-28 PROCEDURE — A9537 TC99M MEBROFENIN: HCPCS

## 2020-01-28 PROCEDURE — 78227 HEPATOBIL SYST IMAGE W/DRUG: CPT

## 2020-01-28 RX ADMIN — SINCALIDE 1.1 MCG: 5 INJECTION, POWDER, LYOPHILIZED, FOR SOLUTION INTRAVENOUS at 13:38

## 2020-01-29 DIAGNOSIS — R10.9 ABDOMINAL PAIN, UNSPECIFIED ABDOMINAL LOCATION: ICD-10-CM

## 2020-01-29 DIAGNOSIS — R14.0 BLOATING: ICD-10-CM

## 2020-01-29 DIAGNOSIS — K44.9 HIATAL HERNIA: Primary | ICD-10-CM

## 2020-01-29 DIAGNOSIS — R11.0 NAUSEA: ICD-10-CM

## 2020-02-18 ENCOUNTER — CONSULT (OUTPATIENT)
Dept: OBGYN CLINIC | Facility: MEDICAL CENTER | Age: 60
End: 2020-02-18
Payer: COMMERCIAL

## 2020-02-18 VITALS
HEIGHT: 64 IN | BODY MASS INDEX: 21.13 KG/M2 | WEIGHT: 123.8 LBS | HEART RATE: 66 BPM | SYSTOLIC BLOOD PRESSURE: 129 MMHG | DIASTOLIC BLOOD PRESSURE: 77 MMHG

## 2020-02-18 DIAGNOSIS — M67.40 GANGLION CYST: Primary | ICD-10-CM

## 2020-02-18 PROCEDURE — 99244 OFF/OP CNSLTJ NEW/EST MOD 40: CPT | Performed by: ORTHOPAEDIC SURGERY

## 2020-02-18 PROCEDURE — 20612 ASPIRATE/INJ GANGLION CYST: CPT | Performed by: ORTHOPAEDIC SURGERY

## 2020-02-18 RX ORDER — BETAMETHASONE SODIUM PHOSPHATE AND BETAMETHASONE ACETATE 3; 3 MG/ML; MG/ML
3 INJECTION, SUSPENSION INTRA-ARTICULAR; INTRALESIONAL; INTRAMUSCULAR; SOFT TISSUE
Status: COMPLETED | OUTPATIENT
Start: 2020-02-18 | End: 2020-02-18

## 2020-02-18 RX ORDER — LIDOCAINE HYDROCHLORIDE 10 MG/ML
3 INJECTION, SOLUTION INFILTRATION; PERINEURAL
Status: COMPLETED | OUTPATIENT
Start: 2020-02-18 | End: 2020-02-18

## 2020-02-18 RX ADMIN — BETAMETHASONE SODIUM PHOSPHATE AND BETAMETHASONE ACETATE 3 MG: 3; 3 INJECTION, SUSPENSION INTRA-ARTICULAR; INTRALESIONAL; INTRAMUSCULAR; SOFT TISSUE at 14:15

## 2020-02-18 RX ADMIN — LIDOCAINE HYDROCHLORIDE 3 ML: 10 INJECTION, SOLUTION INFILTRATION; PERINEURAL at 14:15

## 2020-02-18 NOTE — PROGRESS NOTES
Chief Complaint     Right Wrist ganglion cyst    History of Present Illness     Cynthia Daugherty is a 61 y o  female presents the office today for evaluation of a cyst on the dorsum of her right wrist   I am seeing her in consultation at the request of Dr Bryant Hall  Patient states she has had a cyst to her right wrist for approximately 1 year now  She notes no incident of injury  She states recently has become painful to her when her wrist is in full extension  She does have a history of having ganglion cysts in the past to both wrists  She states she had 1 on her right side operatively removed and 1 on the left side aspirated  She denies any numbness and tingling  She does not take any medication in regards to pain  Past Medical History:   Diagnosis Date    Anxiety     Chronic sinusitis     last assessed 2013    Depression     Disease of thyroid gland     hypothyroidism    Grief reaction     last assessed 2017    Scabies     last assessed 3/27/2015    Sciatica     last assessed 2013    Vitamin D deficiency     Wears partial dentures     upper       Past Surgical History:   Procedure Laterality Date    BLADDER SUSPENSION       SECTION      COLONOSCOPY      MS REPAIR OF NASAL SEPTUM N/A 2019    Procedure: SEPTOPLASTY with Collumellar Strut Reconstruction;  Surgeon: Donato Grant DO;  Location: [facility];  Service: ENT    MS STEREOTACTIC COMP ASSIST PROC,CRANIAL,EXTRADURAL Left 2019    Procedure: FUNCTIONAL ENDOSCOPIC SINUS SURGERY (FESS) IMAGED GUIDED; Surgeon:  Donato Grant DO;  Location: [facility];  Service: ENT       Allergies   Allergen Reactions    Bactrim [Sulfamethoxazole-Trimethoprim] Hives and Rash    Sulfa Antibiotics Hives and Rash    Pregabalin     Cefuroxime GI Intolerance    Ciprofloxacin Nausea Only    Fiorinal [Butalbital-Aspirin-Caffeine] Headache     Can take meloxican       Current Outpatient Medications on File Prior to Visit   Medication Sig Dispense Refill    b complex vitamins capsule Take 1 capsule by mouth daily      buPROPion (WELLBUTRIN XL) 300 mg 24 hr tablet Take 300 mg by mouth      calcium carbonate (OS-GABRIELLE) 600 MG tablet Take 600 mg by mouth 2 (two) times a day with meals      cholecalciferol (VITAMIN D3) 1,000 units tablet Take 1,000 Units by mouth daily      dicyclomine (BENTYL) 10 mg capsule Take 1 capsule (10 mg total) by mouth 4 (four) times a day (before meals and at bedtime) 30 capsule 0    levothyroxine 25 mcg tablet TAKE 1 TABLET BY MOUTH DAILY 90 tablet 0    MAGNESIUM PO Take 1 tablet by mouth daily      metaxalone (SKELAXIN) 800 mg tablet Take 0 5 tablets (400 mg total) by mouth 3 (three) times a day as needed for muscle spasms 90 tablet 0    XIIDRA 5 % op solution       [DISCONTINUED] oxyCODONE-acetaminophen (PERCOCET) 5-325 mg per tablet Take 1 tablet by mouth every 4 (four) hours as needed for moderate painMax Daily Amount: 6 tablets (Patient not taking: Reported on 1/21/2020) 30 tablet 0     No current facility-administered medications on file prior to visit  Social History     Tobacco Use    Smoking status: Never Smoker    Smokeless tobacco: Never Used    Tobacco comment: secondhand smoke exposure   Substance Use Topics    Alcohol use: Yes     Frequency: 2-4 times a month     Comment: socially    Drug use: No       Family History   Problem Relation Age of Onset    Coronary artery disease Mother     Diabetes Mother         DM    Cancer Father        Review of Systems     As stated in the HPI  All other systems were reviewed and are negative  Physical Exam     /77   Pulse 66   Ht 5' 3 5" (1 613 m)   Wt 56 2 kg (123 lb 12 8 oz)   BMI 21 59 kg/m²     GENERAL: This is a well-developed, well-nourished, age-appropriate patient in no acute distress  The patient is alert and oriented x3  Pleasant and cooperative  Eyes: Anicteric sclerae   Extraocular movements appear intact  HENT: Nares are patent with no drainage  Lungs: There is equal chest rise on inspection  Breathing is non-labored with no audible wheezing  Cardiovascular: No cyanosis  No upper extremity lymphadema  Skin: Skin is warm to touch  No obvious skin lesions or rashes other than described below  Neurologic: No ataxia  Psychiatric: Mood and affect are appropriate  Right wrist  Skin intact  Mass noted to the dorsal radial aspect of the wrist  Positive transillumination  Mass minimally tender to palpate  Negative Tinel's of the mass  Pain at the mass with full wrist extension  Able to make full composite fist  5/5 Motor to the APB, FDI, FDP2, FDP5, EDC  Sensation intact to light touch in the median, radial, and ulnar nerve distribution  Data Review     Results Reviewed     None             Imaging:  No new imaging to review    Assessment and Plan      Diagnoses and all orders for this visit:    Ganglion cyst  -     Ambulatory referral to Orthopedic Surgery    Other orders  -     Hand/upper extremity injection         51-year-old female with right wrist dorsal ganglion cyst   Patient and I discussed her condition in the office  I offered her conservative measures of aspiration and cortisone injection in the office or surgical intervention of mass excision  Patient elected to proceed with aspiration in the office today  This is tolerated well in the office  She may return to all activities as tolerated  In the future if this does persist we may need to discuss removing it surgically  I will see her back on a as needed basis  I discussed the etiology and natural course of a ganglion with the patient  We discussed that these originate from joints or tendons and often have a stalk that comes out from the structure that axis of valve to move fluid from either the joint or the tendon into a balloon like structure  The fluid can move into the ganglion and allowed to grow, or recede    This is often based on activity and inflammation  Treatment of this condition was discussed as well  Nonsurgical management involves watchful waiting as many of these will resolve with rest, bracing, and anti-inflammatory medications, or an aspiration and injection with corticosteroid  Aspiration and injection, specifically of dorsal wrist ganglions, typically leads to alleviation of the ganglion in about 50% of patients  Surgical excision was also discussed and this comes with an 80% success rate for volar wrist ganglions and 92% success rate with dorsal wrist ganglions in terms of prevention of recurrence  Risks of the injection including pain, infection, tendon rupture, progression of arthritis, fat atrophy, depigmentation, and worsening of symptoms were all discussed with the patient  There was good understanding by the patient and they wish to proceed      Follow Up: PRN    To Do Next Visit:     PROCEDURES PERFORMED:  Hand/upper extremity injection: R wrist  Date/Time: 2/18/2020 2:15 PM  Supporting Documentation  Indications: pain   Procedure Details  Condition:dorsal carpal ganglion Site: R wrist   Preparation: Patient was prepped and draped in the usual sterile fashion  Needle size: 25 G  Ultrasound guidance: no  Approach: dorsal  Medications administered: 3 mL lidocaine 1 %; 3 mg betamethasone acetate-betamethasone sodium phosphate 6 (3-3) mg/mL    Patient tolerance: patient tolerated the procedure well with no immediate complications  Dressing:  Sterile dressing applied         Scribe Attestation    I,:   Amanda Montoya MA am acting as a scribe while in the presence of the attending physician :        I,:   Giovanna Bunn MD personally performed the services described in this documentation    as scribed in my presence :

## 2020-02-19 ENCOUNTER — TELEPHONE (OUTPATIENT)
Dept: OBGYN CLINIC | Facility: HOSPITAL | Age: 60
End: 2020-02-19

## 2020-02-19 NOTE — TELEPHONE ENCOUNTER
Patient sees Dr Elliot Barnard  Patient is calling in stating that she is having increased pain  She is calling in stating that she had taken Tylenol/Advil but it seems like nothing is working  Patient stated that she can hardly move her move her wrist since it hurts a lot  Patient is asking for a call back relating this      Call back# [phone]

## 2020-02-19 NOTE — TELEPHONE ENCOUNTER
Patient states that she had increased pain after steroid injection yesterday and having some stiffness in wrist   Advised that it is common to have increased pain after steroid injection as we irritate the tissues, It will take a week to calm down  Ice 20 min on 20 min off, elevation, tylenol alternating with NSAIDS OTC as directed  Call office if no improvement or if symptoms get worse

## 2020-03-24 ENCOUNTER — OFFICE VISIT (OUTPATIENT)
Dept: FAMILY MEDICINE CLINIC | Facility: CLINIC | Age: 60
End: 2020-03-24
Payer: COMMERCIAL

## 2020-03-24 VITALS
BODY MASS INDEX: 21.17 KG/M2 | HEIGHT: 64 IN | WEIGHT: 124 LBS | TEMPERATURE: 98.5 F | SYSTOLIC BLOOD PRESSURE: 110 MMHG | HEART RATE: 68 BPM | DIASTOLIC BLOOD PRESSURE: 64 MMHG

## 2020-03-24 DIAGNOSIS — L98.9 SKIN LESION: ICD-10-CM

## 2020-03-24 DIAGNOSIS — L72.9 CYST OF SKIN: Primary | ICD-10-CM

## 2020-03-24 PROBLEM — Z12.39 BREAST CANCER SCREENING: Status: RESOLVED | Noted: 2019-08-26 | Resolved: 2020-03-24

## 2020-03-24 PROBLEM — M67.451 GANGLION CYST OF RIGHT GROIN: Status: RESOLVED | Noted: 2020-03-24 | Resolved: 2020-03-24

## 2020-03-24 PROBLEM — Z01.812 PRE-OPERATIVE LABORATORY EXAMINATION: Status: RESOLVED | Noted: 2019-08-27 | Resolved: 2020-03-24

## 2020-03-24 PROBLEM — M67.451 GANGLION CYST OF RIGHT GROIN: Status: ACTIVE | Noted: 2020-03-24

## 2020-03-24 PROBLEM — Z01.818 PREOP TESTING: Status: RESOLVED | Noted: 2019-08-26 | Resolved: 2020-03-24

## 2020-03-24 PROBLEM — D65 DEFIBRINATION SYNDROME (HCC): Status: ACTIVE | Noted: 2020-03-24

## 2020-03-24 PROBLEM — N95.1 MENOPAUSAL HOT FLUSHES: Status: ACTIVE | Noted: 2020-03-24

## 2020-03-24 PROBLEM — F43.21 GRIEF: Status: ACTIVE | Noted: 2020-03-24

## 2020-03-24 PROBLEM — Z01.818 PREOPERATIVE TESTING: Status: RESOLVED | Noted: 2019-08-27 | Resolved: 2020-03-24

## 2020-03-24 PROBLEM — Z01.818 PREOP GENERAL PHYSICAL EXAM: Status: RESOLVED | Noted: 2019-08-26 | Resolved: 2020-03-24

## 2020-03-24 PROBLEM — M19.90 OSTEOARTHRITIS: Status: ACTIVE | Noted: 2020-03-24

## 2020-03-24 PROBLEM — R39.15 URINARY URGENCY: Status: RESOLVED | Noted: 2019-06-20 | Resolved: 2020-03-24

## 2020-03-24 PROBLEM — Z01.818 PRE-OP TESTING: Status: RESOLVED | Noted: 2019-08-26 | Resolved: 2020-03-24

## 2020-03-24 PROCEDURE — 3008F BODY MASS INDEX DOCD: CPT | Performed by: PHYSICIAN ASSISTANT

## 2020-03-24 PROCEDURE — 1036F TOBACCO NON-USER: CPT | Performed by: PHYSICIAN ASSISTANT

## 2020-03-24 PROCEDURE — 99214 OFFICE O/P EST MOD 30 MIN: CPT | Performed by: PHYSICIAN ASSISTANT

## 2020-03-24 RX ORDER — DOXYCYCLINE HYCLATE 100 MG/1
100 CAPSULE ORAL EVERY 12 HOURS SCHEDULED
Qty: 14 CAPSULE | Refills: 0 | Status: SHIPPED | OUTPATIENT
Start: 2020-03-24 | End: 2020-03-31

## 2020-03-24 RX ORDER — POLYETHYLENE GLYCOL 3350, SODIUM SULFATE, SODIUM CHLORIDE, POTASSIUM CHLORIDE, ASCORBIC ACID, SODIUM ASCORBATE 140-9-5.2G
KIT ORAL
COMMUNITY
Start: 2020-02-17 | End: 2020-08-07 | Stop reason: ALTCHOICE

## 2020-03-24 RX ORDER — ALPRAZOLAM 0.25 MG/1
0.25 TABLET ORAL
COMMUNITY
Start: 2020-01-30 | End: 2020-06-01 | Stop reason: ALTCHOICE

## 2020-03-24 RX ORDER — CHLORAL HYDRATE 500 MG
CAPSULE ORAL
COMMUNITY

## 2020-03-24 RX ORDER — OMEPRAZOLE 20 MG/1
CAPSULE, DELAYED RELEASE ORAL
COMMUNITY
End: 2020-06-01 | Stop reason: ALTCHOICE

## 2020-03-24 RX ORDER — LANOLIN ALCOHOL/MO/W.PET/CERES
CREAM (GRAM) TOPICAL
COMMUNITY
Start: 2015-03-18

## 2020-03-24 NOTE — ASSESSMENT & PLAN NOTE
Right outer hip with small lesions that does resemble an early seborrheic keratosis however I would want to have patient either see dermatology for further evaluation or return to office for removal if it does not resolve after antibiotic use

## 2020-03-24 NOTE — ASSESSMENT & PLAN NOTE
Antibiotic as directed  Warm compresses roughly if possible 3 to 4 times a day for 10 minutes  Full resolution expected

## 2020-03-24 NOTE — PATIENT INSTRUCTIONS
Problem List Items Addressed This Visit        Musculoskeletal and Integument    Cyst of skin - Primary     Antibiotic as directed  Warm compresses roughly if possible 3 to 4 times a day for 10 minutes  Full resolution expected  Relevant Medications    doxycycline hyclate (VIBRAMYCIN) 100 mg capsule    Skin lesion     Right outer hip with small lesions that does resemble an early seborrheic keratosis however I would want to have patient either see dermatology for further evaluation or return to office for removal if it does not resolve after antibiotic use

## 2020-03-24 NOTE — PROGRESS NOTES
Assessment and Plan:    Problem List Items Addressed This Visit        Musculoskeletal and Integument    Cyst of skin - Primary     Antibiotic as directed  Warm compresses roughly if possible 3 to 4 times a day for 10 minutes  Full resolution expected  Relevant Medications    doxycycline hyclate (VIBRAMYCIN) 100 mg capsule    Skin lesion     Right outer hip with small lesions that does resemble an early seborrheic keratosis however I would want to have patient either see dermatology for further evaluation or return to office for removal if it does not resolve after antibiotic use  Diagnoses and all orders for this visit:    Cyst of skin  -     doxycycline hyclate (VIBRAMYCIN) 100 mg capsule; Take 1 capsule (100 mg total) by mouth every 12 (twelve) hours for 7 days    Skin lesion    Other orders  -     Omega-3 Fatty Acids (FISH OIL) 1,000 mg; Take by mouth  -     omeprazole (PriLOSEC) 20 mg delayed release capsule; Take by mouth  -     vitamin B-12 (VITAMIN B-12) 1,000 mcg tablet; Take by mouth  -     ALPRAZolam (XANAX) 0 25 mg tablet; Take 0 25 mg by mouth  -     PLENVU 140 g SOLR; TAKE 140 MILLILITER AS DIRECTED PER OFFICE INSTRUCTIONS              Subjective:      Patient ID: Mahnaz Joyce is a 61 y o  female  CC:    Chief Complaint   Patient presents with    Mass     patient c/o a lump on the right side of her groin that she noticed about 3-4 days ago with tenderness and some swelling  Patient has not taken any NSAIDS  ak       HPI:    Patient states that over the weekend she started having a lump on her right groin which seems to be actually getting better and less obvious  No fever no vaginal discharge  She has never had this before  Also, she has a spot on her right outer thigh that she only noticed there for roughly 3 weeks that looked dry and she just wanted it looked at today    She does have a dermatologist that she sees for various things only when needed and has never had skin cancer  The following portions of the patient's history were reviewed and updated as appropriate: allergies, current medications, past family history, past medical history, past social history, past surgical history and problem list       Review of Systems   Constitutional: Negative  HENT: Negative  Eyes: Negative  Respiratory: Negative  Cardiovascular: Negative  Gastrointestinal: Negative  Endocrine: Negative  Genitourinary: Negative  Musculoskeletal: Negative  Skin:        Cyst in groin   Allergic/Immunologic: Negative  Neurological: Negative  Hematological: Negative  Psychiatric/Behavioral: Negative  Data to review:       Objective:    Vitals:    03/24/20 1214   BP: 110/64   Pulse: 68   Temp: 98 5 °F (36 9 °C)   Weight: 56 2 kg (124 lb)   Height: 5' 3 5" (1 613 m)        Physical Exam   Constitutional: She is oriented to person, place, and time  She appears well-developed and well-nourished  No distress  HENT:   Head: Normocephalic and atraumatic  Eyes: Conjunctivae are normal  Right eye exhibits no discharge  Left eye exhibits no discharge  Neck: Neck supple  Carotid bruit is not present  Cardiovascular: Normal rate, regular rhythm and normal heart sounds  Exam reveals no gallop and no friction rub  No murmur heard  Pulmonary/Chest: Effort normal and breath sounds normal  No respiratory distress  She has no wheezes  She has no rales  Abdominal:       Patient has a palpable tender right groin cyst that is located beneath shaved pubic hair  No evidence of induration erythema or edema and hard to locate  It is soft and roughly 1 cm  Neurological: She is alert and oriented to person, place, and time  Skin: Skin is warm and dry  She is not diaphoretic  Right outer hip area with a raised slightly irregular 0 5 mm light brown elevated lesion    Upon closer look with a light it does look like it might be an early seborrheic keratosis lesion which would be benign  Psychiatric: She has a normal mood and affect  Judgment normal    Nursing note and vitals reviewed

## 2020-03-31 ENCOUNTER — TELEPHONE (OUTPATIENT)
Dept: FAMILY MEDICINE CLINIC | Facility: CLINIC | Age: 60
End: 2020-03-31

## 2020-04-05 DIAGNOSIS — E03.9 ACQUIRED HYPOTHYROIDISM: ICD-10-CM

## 2020-04-05 RX ORDER — LEVOTHYROXINE SODIUM 0.03 MG/1
TABLET ORAL
Qty: 90 TABLET | Refills: 0 | Status: SHIPPED | OUTPATIENT
Start: 2020-04-05 | End: 2020-07-06

## 2020-06-01 ENCOUNTER — OFFICE VISIT (OUTPATIENT)
Dept: FAMILY MEDICINE CLINIC | Facility: CLINIC | Age: 60
End: 2020-06-01
Payer: COMMERCIAL

## 2020-06-01 VITALS
RESPIRATION RATE: 16 BRPM | HEIGHT: 63 IN | DIASTOLIC BLOOD PRESSURE: 70 MMHG | SYSTOLIC BLOOD PRESSURE: 122 MMHG | HEART RATE: 68 BPM | TEMPERATURE: 98.3 F | WEIGHT: 125.4 LBS | BODY MASS INDEX: 22.22 KG/M2

## 2020-06-01 DIAGNOSIS — M54.50 ACUTE LEFT-SIDED LOW BACK PAIN WITHOUT SCIATICA: Primary | ICD-10-CM

## 2020-06-01 PROCEDURE — 3008F BODY MASS INDEX DOCD: CPT | Performed by: PHYSICIAN ASSISTANT

## 2020-06-01 PROCEDURE — 1036F TOBACCO NON-USER: CPT | Performed by: PHYSICIAN ASSISTANT

## 2020-06-01 PROCEDURE — 99214 OFFICE O/P EST MOD 30 MIN: CPT | Performed by: PHYSICIAN ASSISTANT

## 2020-06-01 RX ORDER — LINACLOTIDE 290 UG/1
CAPSULE, GELATIN COATED ORAL
COMMUNITY
Start: 2020-05-30 | End: 2020-12-24 | Stop reason: ALTCHOICE

## 2020-07-03 DIAGNOSIS — E03.9 ACQUIRED HYPOTHYROIDISM: ICD-10-CM

## 2020-07-06 ENCOUNTER — HOSPITAL ENCOUNTER (OUTPATIENT)
Dept: RADIOLOGY | Facility: HOSPITAL | Age: 60
Discharge: HOME | End: 2020-07-06
Payer: COMMERCIAL

## 2020-07-06 ENCOUNTER — TRANSCRIBE ORDERS (OUTPATIENT)
Dept: ADMINISTRATIVE | Facility: HOSPITAL | Age: 60
End: 2020-07-06

## 2020-07-06 DIAGNOSIS — K59.00 CONSTIPATION, UNSPECIFIED CONSTIPATION TYPE: ICD-10-CM

## 2020-07-06 DIAGNOSIS — K59.00 CONSTIPATION, UNSPECIFIED CONSTIPATION TYPE: Primary | ICD-10-CM

## 2020-07-06 PROCEDURE — 74019 RADEX ABDOMEN 2 VIEWS: CPT

## 2020-07-06 RX ORDER — LEVOTHYROXINE SODIUM 0.03 MG/1
TABLET ORAL
Qty: 90 TABLET | Refills: 0 | Status: SHIPPED | OUTPATIENT
Start: 2020-07-06 | End: 2020-10-06

## 2020-07-14 ENCOUNTER — OFFICE VISIT (OUTPATIENT)
Dept: FAMILY MEDICINE CLINIC | Facility: CLINIC | Age: 60
End: 2020-07-14
Payer: COMMERCIAL

## 2020-07-14 VITALS
DIASTOLIC BLOOD PRESSURE: 60 MMHG | HEART RATE: 76 BPM | SYSTOLIC BLOOD PRESSURE: 112 MMHG | WEIGHT: 123 LBS | HEIGHT: 63 IN | TEMPERATURE: 98 F | BODY MASS INDEX: 21.79 KG/M2

## 2020-07-14 DIAGNOSIS — B02.9 HERPES ZOSTER WITHOUT COMPLICATION: Primary | ICD-10-CM

## 2020-07-14 PROCEDURE — 1036F TOBACCO NON-USER: CPT | Performed by: PHYSICIAN ASSISTANT

## 2020-07-14 PROCEDURE — 3008F BODY MASS INDEX DOCD: CPT | Performed by: PHYSICIAN ASSISTANT

## 2020-07-14 PROCEDURE — 99214 OFFICE O/P EST MOD 30 MIN: CPT | Performed by: PHYSICIAN ASSISTANT

## 2020-07-14 RX ORDER — VALACYCLOVIR HYDROCHLORIDE 1 G/1
1000 TABLET, FILM COATED ORAL 3 TIMES DAILY
Qty: 21 TABLET | Refills: 0 | Status: SHIPPED | OUTPATIENT
Start: 2020-07-14 | End: 2020-08-07 | Stop reason: ALTCHOICE
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
## 2020-07-14 NOTE — PATIENT INSTRUCTIONS
Problem List Items Addressed This Visit        Other    Herpes zoster without complication - Primary     Very early in diagnosis so will get pt right on antiviral to take as directed  Relevant Medications    valACYclovir (VALTREX) 1,000 mg tablet        Herpes Zoster   WHAT YOU SHOULD KNOW:   Herpes zoster (HZ) is also called shingles  It is an infection caused by the same virus that causes chickenpox (varicella-zoster virus)  After you get chickenpox, the virus stays in your body for several years without causing any symptoms  HZ occurs when the virus becomes active again  Once active, the virus will travel along a nerve to your skin and cause a rash  CARE AGREEMENT:   You have the right to help plan your care  Learn about your health condition and how it may be treated  Discuss treatment options with your caregivers to decide what care you want to receive  You always have the right to refuse treatment  RISKS:   If left untreated, HZ may cause eye problems, such as a drooping eyelid or blindness  It may lead to a brain infection or stroke  HZ can also cause nerve damage and lead to twitching, dizziness, or loss of taste and hearing  The blisters may leave scars or changes in skin color  HZ may cause pain even after the rash is gone  It may also lead to trouble moving parts of your body  WHILE YOU ARE HERE:   Informed consent  is a legal document that explains the tests, treatments, or procedures that you may need  Informed consent means you understand what will be done and can make decisions about what you want  You give your permission when you sign the consent form  You can have someone sign this form for you if you are not able to sign it  You have the right to understand your medical care in words you know  Before you sign the consent form, understand the risks and benefits of what will be done  Make sure all your questions are answered  Medicines:   · Antiviral medicine:   These help decrease symptoms and healing time  They may also decrease your risk of developing nerve pain  You will need to start taking them within 3 days of the start of symptoms to prevent nerve pain  · Pain medicine: You may need NSAIDs, acetaminophen, or opioid medicine depending on how much pain you are in  Do not wait until the pain is severe before you ask for more pain medicine  · Topical anesthetics: These are used to numb the skin and decrease pain  They can be a cream, gel, spray, or patch  · Anticonvulsants: These decrease nerve pain and may help you sleep at night  · Antidepressants: These may also be used to decrease nerve pain  · Epidural medicine: This is put into your spine to block pain  This medicine treats severe pain that does not get better with other pain medicines  Epidural medicine includes numbing medicine and steroids  Tests:  Your caregiver may send skin scrapings or fluid from your blisters for tests to see if you have HZ  © 2014 [employer] is for End User's use only and may not be sold, redistributed or otherwise used for commercial purposes  All illustrations and images included in CareNotes® are the copyrighted property of [employer]  or [employer]  The above information is an  only  It is not intended as medical advice for individual conditions or treatments  Talk to your doctor, nurse or pharmacist before following any medical regimen to see if it is safe and effective for you

## 2020-07-29 ENCOUNTER — TELEPHONE (OUTPATIENT)
Dept: FAMILY MEDICINE CLINIC | Facility: CLINIC | Age: 60
End: 2020-07-29

## 2020-08-06 ENCOUNTER — TELEPHONE (OUTPATIENT)
Dept: FAMILY MEDICINE CLINIC | Facility: CLINIC | Age: 60
End: 2020-08-06

## 2020-08-07 ENCOUNTER — APPOINTMENT (OUTPATIENT)
Dept: LAB | Facility: HOSPITAL | Age: 60
End: 2020-08-07
Payer: COMMERCIAL

## 2020-08-07 ENCOUNTER — HOSPITAL ENCOUNTER (OUTPATIENT)
Dept: RADIOLOGY | Facility: HOSPITAL | Age: 60
Discharge: HOME | End: 2020-08-07
Payer: COMMERCIAL

## 2020-08-07 ENCOUNTER — OFFICE VISIT (OUTPATIENT)
Dept: FAMILY MEDICINE CLINIC | Facility: CLINIC | Age: 60
End: 2020-08-07
Payer: COMMERCIAL

## 2020-08-07 VITALS
BODY MASS INDEX: 21.79 KG/M2 | HEART RATE: 64 BPM | TEMPERATURE: 98.4 F | WEIGHT: 123 LBS | SYSTOLIC BLOOD PRESSURE: 118 MMHG | HEIGHT: 63 IN | DIASTOLIC BLOOD PRESSURE: 78 MMHG

## 2020-08-07 DIAGNOSIS — R59.0 AXILLARY ADENOPATHY: Primary | ICD-10-CM

## 2020-08-07 DIAGNOSIS — R59.0 AXILLARY ADENOPATHY: ICD-10-CM

## 2020-08-07 DIAGNOSIS — N64.4 BREAST PAIN: ICD-10-CM

## 2020-08-07 LAB
ALBUMIN SERPL BCP-MCNC: 3.7 G/DL (ref 3.5–5)
ALP SERPL-CCNC: 104 U/L (ref 46–116)
ALT SERPL W P-5'-P-CCNC: 86 U/L (ref 12–78)
ANION GAP SERPL CALCULATED.3IONS-SCNC: 8 MMOL/L (ref 4–13)
AST SERPL W P-5'-P-CCNC: 54 U/L (ref 5–45)
BASOPHILS # BLD AUTO: 0.02 THOUSANDS/ΜL (ref 0–0.1)
BASOPHILS NFR BLD AUTO: 0 % (ref 0–1)
BILIRUB SERPL-MCNC: 0.24 MG/DL (ref 0.2–1)
BUN SERPL-MCNC: 18 MG/DL (ref 5–25)
CALCIUM SERPL-MCNC: 8.6 MG/DL (ref 8.3–10.1)
CHLORIDE SERPL-SCNC: 103 MMOL/L (ref 100–108)
CO2 SERPL-SCNC: 29 MMOL/L (ref 21–32)
CREAT SERPL-MCNC: 0.79 MG/DL (ref 0.6–1.3)
EOSINOPHIL # BLD AUTO: 0.1 THOUSAND/ΜL (ref 0–0.61)
EOSINOPHIL NFR BLD AUTO: 2 % (ref 0–6)
ERYTHROCYTE [DISTWIDTH] IN BLOOD BY AUTOMATED COUNT: 13.9 % (ref 11.6–15.1)
GFR SERPL CREATININE-BSD FRML MDRD: 82 ML/MIN/1.73SQ M
GLUCOSE SERPL-MCNC: 93 MG/DL (ref 65–140)
HCT VFR BLD AUTO: 40.3 % (ref 34.8–46.1)
HGB BLD-MCNC: 13 G/DL (ref 11.5–15.4)
IMM GRANULOCYTES # BLD AUTO: 0.01 THOUSAND/UL (ref 0–0.2)
IMM GRANULOCYTES NFR BLD AUTO: 0 % (ref 0–2)
LYMPHOCYTES # BLD AUTO: 1.59 THOUSANDS/ΜL (ref 0.6–4.47)
LYMPHOCYTES NFR BLD AUTO: 29 % (ref 14–44)
MCH RBC QN AUTO: 29.5 PG (ref 26.8–34.3)
MCHC RBC AUTO-ENTMCNC: 32.3 G/DL (ref 31.4–37.4)
MCV RBC AUTO: 92 FL (ref 82–98)
MONOCYTES # BLD AUTO: 0.56 THOUSAND/ΜL (ref 0.17–1.22)
MONOCYTES NFR BLD AUTO: 10 % (ref 4–12)
NEUTROPHILS # BLD AUTO: 3.17 THOUSANDS/ΜL (ref 1.85–7.62)
NEUTS SEG NFR BLD AUTO: 59 % (ref 43–75)
NRBC BLD AUTO-RTO: 0 /100 WBCS
PLATELET # BLD AUTO: 278 THOUSANDS/UL (ref 149–390)
PMV BLD AUTO: 9 FL (ref 8.9–12.7)
POTASSIUM SERPL-SCNC: 4.1 MMOL/L (ref 3.5–5.3)
PROT SERPL-MCNC: 7.2 G/DL (ref 6.4–8.2)
RBC # BLD AUTO: 4.4 MILLION/UL (ref 3.81–5.12)
SODIUM SERPL-SCNC: 140 MMOL/L (ref 136–145)
WBC # BLD AUTO: 5.45 THOUSAND/UL (ref 4.31–10.16)

## 2020-08-07 PROCEDURE — 86617 LYME DISEASE ANTIBODY: CPT | Performed by: PHYSICIAN ASSISTANT

## 2020-08-07 PROCEDURE — 99214 OFFICE O/P EST MOD 30 MIN: CPT | Performed by: PHYSICIAN ASSISTANT

## 2020-08-07 PROCEDURE — 36415 COLL VENOUS BLD VENIPUNCTURE: CPT | Performed by: PHYSICIAN ASSISTANT

## 2020-08-07 PROCEDURE — 1036F TOBACCO NON-USER: CPT | Performed by: PHYSICIAN ASSISTANT

## 2020-08-07 PROCEDURE — 80053 COMPREHEN METABOLIC PANEL: CPT | Performed by: PHYSICIAN ASSISTANT

## 2020-08-07 PROCEDURE — 85025 COMPLETE CBC W/AUTO DIFF WBC: CPT | Performed by: PHYSICIAN ASSISTANT

## 2020-08-07 PROCEDURE — 86618 LYME DISEASE ANTIBODY: CPT | Performed by: PHYSICIAN ASSISTANT

## 2020-08-07 PROCEDURE — 71046 X-RAY EXAM CHEST 2 VIEWS: CPT

## 2020-08-07 PROCEDURE — 3008F BODY MASS INDEX DOCD: CPT | Performed by: PHYSICIAN ASSISTANT

## 2020-08-07 NOTE — PROGRESS NOTES
Assessment and Plan:    Problem List Items Addressed This Visit        Immune and Lymphatic    Axillary adenopathy - Primary     Initially area was deemed to be reactive secondary to shingles however rash has completely resolved  Check chest x-ray blood work and ultrasound and patient is due for mammogram as well  Relevant Orders    XR chest pa & lateral    US superficial lump (non extremity)    Lyme Antibody Profile with reflex to WB    CBC and differential    Comprehensive metabolic panel    Mammo diagnostic bilateral w cad       Other    Breast pain     Diagnostic mammogram bilaterally ordered  Relevant Orders    Mammo diagnostic bilateral w cad                 Diagnoses and all orders for this visit:    Axillary adenopathy  -     XR chest pa & lateral; Future  -     US superficial lump (non extremity); Future  -     Lyme Antibody Profile with reflex to WB  -     CBC and differential  -     Comprehensive metabolic panel  -     Mammo diagnostic bilateral w cad; Future    Breast pain  -     Mammo diagnostic bilateral w cad; Future              Subjective:      Patient ID: Rafaela Barrow is a 61 y o  female  CC:    Chief Complaint   Patient presents with    Mass     Lump right axila for the past 2 weeks that has been increasing in size  Pt states it is painful   - [facility]       HPI:    Patient was seen almost 3 weeks ago and diagnosed with probable early onset shingles as she had a rash on her right outer shoulder area and discomfort in her axilla  Patient states that the rash has gone away however she has a lot of swelling and pain in her right armpit and breasts  She is overdue for mammogram       The following portions of the patient's history were reviewed and updated as appropriate: allergies, current medications, past family history, past medical history, past social history, past surgical history and problem list       Review of Systems   Constitutional: Negative  HENT: Negative  Eyes: Negative  Respiratory: Negative  Cardiovascular: Negative  Gastrointestinal: Negative  Endocrine: Negative  Genitourinary: Negative  Musculoskeletal: Negative  Skin: Negative  Allergic/Immunologic: Negative  Neurological: Negative  Hematological: Negative  Psychiatric/Behavioral: Negative  Data to review:       Objective:    Vitals:    08/07/20 1408   BP: 118/78   BP Location: Left arm   Patient Position: Sitting   Cuff Size: Standard   Pulse: 64   Temp: 98 4 °F (36 9 °C)   TempSrc: Temporal   Weight: 55 8 kg (123 lb)   Height: 5' 3" (1 6 m)        Physical Exam  Vitals signs and nursing note reviewed  Constitutional:       General: She is not in acute distress  Appearance: She is well-developed  She is not diaphoretic  HENT:      Head: Normocephalic and atraumatic  Eyes:      General:         Right eye: No discharge  Left eye: No discharge  Conjunctiva/sclera: Conjunctivae normal    Neck:      Musculoskeletal: Neck supple  Vascular: No carotid bruit  Cardiovascular:      Rate and Rhythm: Normal rate and regular rhythm  Heart sounds: Normal heart sounds  No murmur  No friction rub  No gallop  Pulmonary:      Effort: Pulmonary effort is normal  No respiratory distress  Breath sounds: Normal breath sounds  No wheezing or rales  Lymphadenopathy:      Upper Body:      Right upper body: Axillary adenopathy present  Skin:     General: Skin is warm and dry  Neurological:      Mental Status: She is alert and oriented to person, place, and time     Psychiatric:         Judgment: Judgment normal

## 2020-08-07 NOTE — ASSESSMENT & PLAN NOTE
Initially area was deemed to be reactive secondary to shingles however rash has completely resolved  Check chest x-ray blood work and ultrasound and patient is due for mammogram as well

## 2020-08-07 NOTE — PATIENT INSTRUCTIONS
Problem List Items Addressed This Visit        Immune and Lymphatic    Axillary adenopathy - Primary     Initially area was deemed to be reactive secondary to shingles however rash has completely resolved  Check chest x-ray blood work and ultrasound and patient is due for mammogram as well  Other    Breast pain     Diagnostic mammogram bilaterally ordered

## 2020-08-10 LAB
B BURGDOR IGG PATRN SER IB-IMP: POSITIVE
B BURGDOR IGG+IGM SER-ACNC: 1.63 ISR (ref 0–0.9)
B BURGDOR IGM PATRN SER IB-IMP: POSITIVE
B BURGDOR18KD IGG SER QL IB: PRESENT
B BURGDOR23KD IGG SER QL IB: PRESENT
B BURGDOR23KD IGM SER QL IB: PRESENT
B BURGDOR28KD IGG SER QL IB: ABNORMAL
B BURGDOR30KD IGG SER QL IB: ABNORMAL
B BURGDOR39KD IGG SER QL IB: PRESENT
B BURGDOR39KD IGM SER QL IB: PRESENT
B BURGDOR41KD IGG SER QL IB: PRESENT
B BURGDOR41KD IGM SER QL IB: PRESENT
B BURGDOR45KD IGG SER QL IB: ABNORMAL
B BURGDOR58KD IGG SER QL IB: PRESENT
B BURGDOR66KD IGG SER QL IB: ABNORMAL
B BURGDOR93KD IGG SER QL IB: ABNORMAL

## 2020-08-11 DIAGNOSIS — A69.20 LYME DISEASE: Primary | ICD-10-CM

## 2020-08-11 RX ORDER — DOXYCYCLINE 100 MG/1
100 TABLET ORAL 2 TIMES DAILY
Qty: 42 TABLET | Refills: 0 | Status: SHIPPED | OUTPATIENT
Start: 2020-08-11 | End: 2020-09-01

## 2020-08-11 NOTE — RESULT ENCOUNTER NOTE
Please call the patient and let her know that she has acute Lyme disease which is most likely causing her symptoms  This is also most likely why her liver function tests are slightly elevated  She needs to be on doxycycline 100 mg twice daily for 21 days and I have repeated blood work to check on her liver function to be done in 1 month after treatment is done  I did send her prescription to the only local pharmacy on file  She should still have other studies done just to be safe  Thank you

## 2020-08-12 ENCOUNTER — TELEPHONE (OUTPATIENT)
Dept: FAMILY MEDICINE CLINIC | Facility: CLINIC | Age: 60
End: 2020-08-12

## 2020-08-12 NOTE — TELEPHONE ENCOUNTER
PT IS ASKING FOR A CALL BACK PLEASE  SHE GOT A CALL FROM HER PHARMACY THAT SHE IS BEING PRESCRIBED A MEDICATION STARTING WITH "DOX" AND SHE IS ASKING WHAT THIS IS FOR AND WHY PLEASE  CAN SOMEONE PLEASE CALL HER BACK  THANK YOU 
[employer]

## 2020-08-18 ENCOUNTER — TELEPHONE (OUTPATIENT)
Dept: FAMILY MEDICINE CLINIC | Facility: CLINIC | Age: 60
End: 2020-08-18

## 2020-08-18 ENCOUNTER — HOSPITAL ENCOUNTER (OUTPATIENT)
Dept: MAMMOGRAPHY | Facility: CLINIC | Age: 60
Discharge: HOME | End: 2020-08-18
Payer: COMMERCIAL

## 2020-08-18 ENCOUNTER — APPOINTMENT (OUTPATIENT)
Dept: ULTRASOUND IMAGING | Facility: HOSPITAL | Age: 60
End: 2020-08-18
Payer: COMMERCIAL

## 2020-08-18 ENCOUNTER — TRANSCRIBE ORDERS (OUTPATIENT)
Dept: ADMINISTRATIVE | Facility: HOSPITAL | Age: 60
End: 2020-08-18

## 2020-08-18 VITALS — HEIGHT: 63 IN | WEIGHT: 123 LBS | BODY MASS INDEX: 21.79 KG/M2

## 2020-08-18 DIAGNOSIS — N64.4 BREAST PAIN: ICD-10-CM

## 2020-08-18 DIAGNOSIS — Z09 FOLLOW-UP EXAM, 3-6 MONTHS SINCE PREVIOUS EXAM: Primary | ICD-10-CM

## 2020-08-18 DIAGNOSIS — R59.0 AXILLARY ADENOPATHY: ICD-10-CM

## 2020-08-18 PROCEDURE — G0279 TOMOSYNTHESIS, MAMMO: HCPCS

## 2020-08-18 PROCEDURE — 77066 DX MAMMO INCL CAD BI: CPT

## 2020-08-18 PROCEDURE — 76642 ULTRASOUND BREAST LIMITED: CPT

## 2020-08-18 NOTE — TELEPHONE ENCOUNTER
PATIENT IS HAVING PAIN RADIATING FROM FRONT TO BACK ON HER L SIDE AROUND HER WAIST AREA, SHE WOULD LIKE TO KNOW IF THIS COULD BE A SIDE EFFECT OF THE DOXYCYCLINE   PLEASE CALL [phone]

## 2020-08-20 NOTE — TELEPHONE ENCOUNTER
Pt called in and I told her what KB said and she said she still has the pain which today it's about a 4 , last couple of days it was about a 7  the pain    also when she goes to pee it comes out as little drops first

## 2020-08-21 ENCOUNTER — OFFICE VISIT (OUTPATIENT)
Dept: FAMILY MEDICINE CLINIC | Facility: CLINIC | Age: 60
End: 2020-08-21
Payer: COMMERCIAL

## 2020-08-21 VITALS
HEART RATE: 64 BPM | DIASTOLIC BLOOD PRESSURE: 68 MMHG | HEIGHT: 63 IN | BODY MASS INDEX: 22.08 KG/M2 | TEMPERATURE: 98 F | WEIGHT: 124.6 LBS | SYSTOLIC BLOOD PRESSURE: 112 MMHG

## 2020-08-21 DIAGNOSIS — R79.89 ELEVATED LFTS: ICD-10-CM

## 2020-08-21 DIAGNOSIS — R30.0 DYSURIA: Primary | ICD-10-CM

## 2020-08-21 DIAGNOSIS — A69.20 LYME DISEASE: ICD-10-CM

## 2020-08-21 PROCEDURE — 99213 OFFICE O/P EST LOW 20 MIN: CPT | Performed by: PHYSICIAN ASSISTANT

## 2020-08-21 PROCEDURE — 1036F TOBACCO NON-USER: CPT | Performed by: PHYSICIAN ASSISTANT

## 2020-08-21 PROCEDURE — 87086 URINE CULTURE/COLONY COUNT: CPT | Performed by: PHYSICIAN ASSISTANT

## 2020-08-21 PROCEDURE — 81002 URINALYSIS NONAUTO W/O SCOPE: CPT | Performed by: PHYSICIAN ASSISTANT

## 2020-08-21 PROCEDURE — 3008F BODY MASS INDEX DOCD: CPT | Performed by: PHYSICIAN ASSISTANT

## 2020-08-21 NOTE — PROGRESS NOTES
Assessment and Plan:    Problem List Items Addressed This Visit        Other    Elevated LFTs     Patient with elevated LFTs secondary to most likely Lyme  I did give her a prescription to repeat these tests after her doxycycline treatment is finished  Relevant Orders    Hepatic function panel    Dysuria - Primary     In-house urine dip was negative for any abnormality and patient is on doxycycline for 1 week now for Lyme disease which should cover a urinary tract infection  I will be sending her urine out just to make sure that she has not developed an infection that is not susceptible to doxycycline and call with results  Relevant Orders    POCT urine dip (Completed)    Urine culture    Lyme disease     Patient being currently treated by doxycycline 100 mg twice daily  This could be causing her some GI upset and stomach discomfort and that could be explaining her current symptoms  At this time I am having her continue this medication as it is for the most part being tolerated  It was explained to patient that we normally do not repeat blood work for Lyme for test of cure  Diagnoses and all orders for this visit:    Dysuria  -     POCT urine dip  -     Urine culture; Future  -     Urine culture    Elevated LFTs  -     Hepatic function panel; Future    Lyme disease              Subjective:      Patient ID: Tangela Freeman is a 61 y o  female  CC:    Chief Complaint   Patient presents with    Flank Pain     Pt c/op left flank pain on and off x 3 to 4 days  She states there are days it is worse  Pt also having urinary hesitancy and pain at the end of her stream  kw       HPI:    Patient states that she has been having intermittent urinary hesitancy with some burning at very end of her stream   She is on doxycycline 100 mg twice daily for 1 week now for Lyme disease  She states she has been intermittently nauseous but no change in her increasing gas at all    She states that she is having some looser stools than usual as well  No fever  She states that sometimes the pain that is occurring in her left side in abdomen is sharp in fleeting and sometimes it is just there  The following portions of the patient's history were reviewed and updated as appropriate: allergies, current medications, past family history, past medical history, past social history, past surgical history and problem list       Review of Systems   Constitutional: Positive for appetite change  HENT: Negative  Eyes: Negative  Respiratory: Negative  Cardiovascular: Negative  Gastrointestinal: Positive for abdominal pain and nausea  Endocrine: Negative  Genitourinary: Positive for flank pain  Skin: Negative  Allergic/Immunologic: Negative  Neurological: Negative  Hematological: Negative  Psychiatric/Behavioral: Negative  Data to review:       Objective:    Vitals:    08/21/20 1443   BP: 112/68   BP Location: Left arm   Patient Position: Sitting   Pulse: 64   Temp: 98 °F (36 7 °C)   TempSrc: Temporal   Weight: 56 5 kg (124 lb 9 6 oz)   Height: 5' 3" (1 6 m)        Physical Exam  Vitals signs and nursing note reviewed  Constitutional:       Appearance: Normal appearance  She is well-developed  HENT:      Head: Normocephalic and atraumatic  Eyes:      General: Lids are normal       Conjunctiva/sclera: Conjunctivae normal       Pupils: Pupils are equal, round, and reactive to light  Cardiovascular:      Rate and Rhythm: Normal rate and regular rhythm  Heart sounds: No murmur  Pulmonary:      Effort: Pulmonary effort is normal       Breath sounds: Normal breath sounds  Abdominal:      General: Abdomen is flat  Bowel sounds are normal  There is no distension  Palpations: Abdomen is soft  Tenderness: There is abdominal tenderness in the suprapubic area and left lower quadrant  There is no right CVA tenderness, left CVA tenderness or guarding  Comments: Discomfort to palpation in the noted area which is mostly left lower quadrant suprapubic area  Skin:     General: Skin is warm and dry  Neurological:      General: No focal deficit present  Mental Status: She is alert  Coordination: Coordination is intact  Psychiatric:         Mood and Affect: Mood normal          Behavior: Behavior normal  Behavior is cooperative  Thought Content:  Thought content normal          Judgment: Judgment normal

## 2020-08-21 NOTE — ASSESSMENT & PLAN NOTE
In-house urine dip was negative for any abnormality and patient is on doxycycline for 1 week now for Lyme disease which should cover a urinary tract infection  I will be sending her urine out just to make sure that she has not developed an infection that is not susceptible to doxycycline and call with results

## 2020-08-21 NOTE — ASSESSMENT & PLAN NOTE
Patient with elevated LFTs secondary to most likely Lyme  I did give her a prescription to repeat these tests after her doxycycline treatment is finished

## 2020-08-21 NOTE — PATIENT INSTRUCTIONS
Problem List Items Addressed This Visit     None      Visit Diagnoses     Dysuria    -  Primary    Relevant Orders    POCT urine dip

## 2020-08-21 NOTE — ASSESSMENT & PLAN NOTE
Patient being currently treated by doxycycline 100 mg twice daily  This could be causing her some GI upset and stomach discomfort and that could be explaining her current symptoms  At this time I am having her continue this medication as it is for the most part being tolerated  It was explained to patient that we normally do not repeat blood work for Lyme for test of cure

## 2020-08-22 LAB — BACTERIA UR CULT: NORMAL

## 2020-08-27 DIAGNOSIS — A69.20 LYME DISEASE: ICD-10-CM

## 2020-08-27 RX ORDER — DOXYCYCLINE 100 MG/1
100 TABLET ORAL 2 TIMES DAILY
Qty: 42 TABLET | Refills: 0 | OUTPATIENT
Start: 2020-08-27 | End: 2020-09-17

## 2020-10-01 ENCOUNTER — LAB (OUTPATIENT)
Dept: LAB | Facility: HOSPITAL | Age: 60
End: 2020-10-01
Payer: COMMERCIAL

## 2020-10-01 DIAGNOSIS — R79.89 ELEVATED LFTS: ICD-10-CM

## 2020-10-01 LAB
ALBUMIN SERPL BCP-MCNC: 4.1 G/DL (ref 3.5–5)
ALP SERPL-CCNC: 62 U/L (ref 46–116)
ALT SERPL W P-5'-P-CCNC: 27 U/L (ref 12–78)
AST SERPL W P-5'-P-CCNC: 18 U/L (ref 5–45)
BILIRUB DIRECT SERPL-MCNC: 0.1 MG/DL (ref 0–0.2)
BILIRUB SERPL-MCNC: 0.37 MG/DL (ref 0.2–1)
PROT SERPL-MCNC: 7.6 G/DL (ref 6.4–8.2)

## 2020-10-01 PROCEDURE — 80076 HEPATIC FUNCTION PANEL: CPT

## 2020-10-01 PROCEDURE — 36415 COLL VENOUS BLD VENIPUNCTURE: CPT

## 2020-10-06 DIAGNOSIS — E03.9 ACQUIRED HYPOTHYROIDISM: ICD-10-CM

## 2020-10-06 RX ORDER — LEVOTHYROXINE SODIUM 0.03 MG/1
TABLET ORAL
Qty: 90 TABLET | Refills: 0 | Status: SHIPPED | OUTPATIENT
Start: 2020-10-06 | End: 2020-12-28

## 2020-11-24 ENCOUNTER — HOSPITAL ENCOUNTER (OUTPATIENT)
Dept: MAMMOGRAPHY | Facility: CLINIC | Age: 60
Discharge: HOME | End: 2020-11-24
Payer: COMMERCIAL

## 2020-11-24 DIAGNOSIS — Z09 FOLLOW-UP EXAM, 3-6 MONTHS SINCE PREVIOUS EXAM: ICD-10-CM

## 2020-11-24 PROCEDURE — 76642 ULTRASOUND BREAST LIMITED: CPT

## 2020-11-30 PROCEDURE — 87624 HPV HI-RISK TYP POOLED RSLT: CPT | Performed by: OBSTETRICS & GYNECOLOGY

## 2020-11-30 PROCEDURE — G0145 SCR C/V CYTO,THINLAYER,RESCR: HCPCS | Performed by: OBSTETRICS & GYNECOLOGY

## 2020-12-01 ENCOUNTER — LAB REQUISITION (OUTPATIENT)
Dept: LAB | Facility: HOSPITAL | Age: 60
End: 2020-12-01
Payer: COMMERCIAL

## 2020-12-01 DIAGNOSIS — Z01.419 ENCOUNTER FOR GYNECOLOGICAL EXAMINATION (GENERAL) (ROUTINE) WITHOUT ABNORMAL FINDINGS: ICD-10-CM

## 2020-12-03 LAB
HPV HR 12 DNA CVX QL NAA+PROBE: NEGATIVE
HPV16 DNA CVX QL NAA+PROBE: NEGATIVE
HPV18 DNA CVX QL NAA+PROBE: NEGATIVE

## 2020-12-04 LAB
LAB AP GYN PRIMARY INTERPRETATION: NORMAL
Lab: NORMAL

## 2020-12-21 ENCOUNTER — TELEPHONE (OUTPATIENT)
Dept: FAMILY MEDICINE CLINIC | Facility: CLINIC | Age: 60
End: 2020-12-21

## 2020-12-21 DIAGNOSIS — F41.9 ANXIETY: Primary | ICD-10-CM

## 2020-12-21 RX ORDER — BUPROPION HYDROCHLORIDE 300 MG/1
300 TABLET ORAL EVERY MORNING
Qty: 30 TABLET | Refills: 5 | Status: SHIPPED | OUTPATIENT
Start: 2020-12-21 | End: 2021-07-12

## 2020-12-24 ENCOUNTER — TELEMEDICINE (OUTPATIENT)
Dept: FAMILY MEDICINE CLINIC | Facility: CLINIC | Age: 60
End: 2020-12-24
Payer: COMMERCIAL

## 2020-12-24 VITALS — BODY MASS INDEX: 21.97 KG/M2 | HEIGHT: 63 IN | WEIGHT: 124 LBS

## 2020-12-24 DIAGNOSIS — R59.1 LYMPHADENOPATHY: ICD-10-CM

## 2020-12-24 DIAGNOSIS — J01.40 ACUTE NON-RECURRENT PANSINUSITIS: Primary | ICD-10-CM

## 2020-12-24 DIAGNOSIS — L65.9 HAIR LOSS: ICD-10-CM

## 2020-12-24 PROCEDURE — 99213 OFFICE O/P EST LOW 20 MIN: CPT | Performed by: FAMILY MEDICINE

## 2020-12-24 PROCEDURE — 1036F TOBACCO NON-USER: CPT | Performed by: FAMILY MEDICINE

## 2020-12-24 PROCEDURE — 3008F BODY MASS INDEX DOCD: CPT | Performed by: FAMILY MEDICINE

## 2020-12-24 RX ORDER — CONJUGATED ESTROGENS 0.62 MG/G
CREAM VAGINAL
COMMUNITY
Start: 2020-10-05

## 2020-12-24 RX ORDER — AZITHROMYCIN 250 MG/1
TABLET, FILM COATED ORAL
Qty: 6 TABLET | Refills: 0 | Status: SHIPPED | OUTPATIENT
Start: 2020-12-24 | End: 2020-12-29

## 2020-12-28 DIAGNOSIS — E03.9 ACQUIRED HYPOTHYROIDISM: ICD-10-CM

## 2020-12-28 RX ORDER — LEVOTHYROXINE SODIUM 0.03 MG/1
TABLET ORAL
Qty: 90 TABLET | Refills: 0 | Status: SHIPPED | OUTPATIENT
Start: 2020-12-28 | End: 2021-04-16

## 2021-01-18 ENCOUNTER — LAB (OUTPATIENT)
Dept: LAB | Facility: CLINIC | Age: 61
End: 2021-01-18
Payer: COMMERCIAL

## 2021-01-18 DIAGNOSIS — R59.1 LYMPHADENOPATHY: ICD-10-CM

## 2021-01-18 DIAGNOSIS — L65.9 HAIR LOSS: ICD-10-CM

## 2021-01-18 LAB
BASOPHILS # BLD AUTO: 0.02 THOUSANDS/ΜL (ref 0–0.1)
BASOPHILS NFR BLD AUTO: 0 % (ref 0–1)
EOSINOPHIL # BLD AUTO: 0.06 THOUSAND/ΜL (ref 0–0.61)
EOSINOPHIL NFR BLD AUTO: 1 % (ref 0–6)
ERYTHROCYTE [DISTWIDTH] IN BLOOD BY AUTOMATED COUNT: 13.1 % (ref 11.6–15.1)
HCT VFR BLD AUTO: 41.7 % (ref 34.8–46.1)
HGB BLD-MCNC: 13.6 G/DL (ref 11.5–15.4)
IMM GRANULOCYTES # BLD AUTO: 0.01 THOUSAND/UL (ref 0–0.2)
IMM GRANULOCYTES NFR BLD AUTO: 0 % (ref 0–2)
LYMPHOCYTES # BLD AUTO: 2.73 THOUSANDS/ΜL (ref 0.6–4.47)
LYMPHOCYTES NFR BLD AUTO: 51 % (ref 14–44)
MCH RBC QN AUTO: 29.4 PG (ref 26.8–34.3)
MCHC RBC AUTO-ENTMCNC: 32.6 G/DL (ref 31.4–37.4)
MCV RBC AUTO: 90 FL (ref 82–98)
MONOCYTES # BLD AUTO: 0.48 THOUSAND/ΜL (ref 0.17–1.22)
MONOCYTES NFR BLD AUTO: 9 % (ref 4–12)
NEUTROPHILS # BLD AUTO: 2.1 THOUSANDS/ΜL (ref 1.85–7.62)
NEUTS SEG NFR BLD AUTO: 39 % (ref 43–75)
NRBC BLD AUTO-RTO: 0 /100 WBCS
PLATELET # BLD AUTO: 256 THOUSANDS/UL (ref 149–390)
PMV BLD AUTO: 9 FL (ref 8.9–12.7)
RBC # BLD AUTO: 4.62 MILLION/UL (ref 3.81–5.12)
TSH SERPL DL<=0.05 MIU/L-ACNC: 4.14 UIU/ML (ref 0.36–3.74)
WBC # BLD AUTO: 5.4 THOUSAND/UL (ref 4.31–10.16)

## 2021-01-18 PROCEDURE — 84443 ASSAY THYROID STIM HORMONE: CPT

## 2021-01-18 PROCEDURE — 85025 COMPLETE CBC W/AUTO DIFF WBC: CPT

## 2021-01-18 PROCEDURE — 36415 COLL VENOUS BLD VENIPUNCTURE: CPT

## 2021-01-20 ENCOUNTER — TELEMEDICINE (OUTPATIENT)
Dept: FAMILY MEDICINE CLINIC | Facility: CLINIC | Age: 61
End: 2021-01-20
Payer: COMMERCIAL

## 2021-01-20 VITALS
SYSTOLIC BLOOD PRESSURE: 128 MMHG | BODY MASS INDEX: 21.44 KG/M2 | HEART RATE: 61 BPM | HEIGHT: 63 IN | WEIGHT: 121 LBS | TEMPERATURE: 98.6 F | DIASTOLIC BLOOD PRESSURE: 60 MMHG

## 2021-01-20 DIAGNOSIS — J32.9 CHRONIC SINUSITIS, UNSPECIFIED LOCATION: Primary | ICD-10-CM

## 2021-01-20 DIAGNOSIS — R26.89 ABNORMALITY OF GAIT DUE TO IMPAIRMENT OF BALANCE: ICD-10-CM

## 2021-01-20 DIAGNOSIS — M71.22 SYNOVIAL CYST OF LEFT POPLITEAL SPACE: ICD-10-CM

## 2021-01-20 PROBLEM — J32.0 CHRONIC MAXILLARY SINUSITIS: Status: RESOLVED | Noted: 2019-05-28 | Resolved: 2021-01-20

## 2021-01-20 PROBLEM — J32.3 CHRONIC SPHENOIDAL SINUSITIS: Status: RESOLVED | Noted: 2019-08-27 | Resolved: 2021-01-20

## 2021-01-20 PROCEDURE — 99214 OFFICE O/P EST MOD 30 MIN: CPT | Performed by: FAMILY MEDICINE

## 2021-01-20 RX ORDER — AMOXICILLIN AND CLAVULANATE POTASSIUM 875; 125 MG/1; MG/1
1 TABLET, FILM COATED ORAL EVERY 12 HOURS SCHEDULED
Qty: 20 TABLET | Refills: 0 | Status: SHIPPED | OUTPATIENT
Start: 2021-01-20 | End: 2021-01-30

## 2021-01-21 ENCOUNTER — TELEPHONE (OUTPATIENT)
Dept: FAMILY MEDICINE CLINIC | Facility: CLINIC | Age: 61
End: 2021-01-21

## 2021-01-21 NOTE — PATIENT INSTRUCTIONS
Problem List Items Addressed This Visit     Abnormality of gait due to impairment of balance     Patient has been having the balance issues for a while now  She feels like she is going to fall, but she has not actually fallen  Since the patient is consistently feeling like she is leaning to the right and backwards, this suggest that there may be more going on than just vertigo  She is not having the sensation of motion either  I feel we need to rule out tumor, and examine the interior auditory canals for other problems with this  Has been persistent for greater than 6 weeks  Again, based on this it is most appropriate to get an MRI to rule out tumor  Relevant Orders    MRI brain IAC wo and w contrast    Chronic sinusitis - Primary     Patient did recently have antibiotic treatment  Her blood count was normal with this  Since she improved on antibiotics, I will repeat the course of antibiotics  She is going to get an MRI because of the changes in balance, which would suggest that there could be some further issues  This should also evaluate sinuses  If this is not helpful in the end, i e  Symptoms recur, would recommend follow-up with ENT  Relevant Medications    amoxicillin-clavulanate (Augmentin) 875-125 mg per tablet    Synovial cyst of popliteal space     Recommend follow-up with orthopedics  Has been a problem recently  Relevant Orders    Ambulatory referral to Orthopedic Surgery          COVID 19 Instructions    Simeon Mercedes was advised to limit contact with others to essential tasks such as getting food, medications, and medical care  Proper handwashing reviewed, and Hand sanitzer when washing is not available  If the patient develops symptoms of COVID 19, the patient should call the office as soon as possible  For 2035-8526 Flu season, it is strongly recommended that Flu Vaccinations be obtained  Please try to download Google Duo    Once you do download this on your phone, you will be prompted to add your phone number to the account  After that, he should receive a text from backstitch, and use that code to verify your phone number  After that, you should be able to use Google Duo to receive and make video calls  Please download Microsoft Teams to your phone or computer  We will be transitioning to this platform for Video Visits  Instructions for downloading this are available from the office  We are committed to getting you vaccinated as soon as possible and will be closely following CDC and [facility]  guidelines as they are released and revised  Please refer to our COVID-19 vaccine webpage for the most up to date information on the vaccine and our distribution efforts      Cy barrera

## 2021-01-21 NOTE — PROGRESS NOTES
Virtual Regular Visit      Assessment/Plan:    Problem List Items Addressed This Visit     Abnormality of gait due to impairment of balance     Patient has been having the balance issues for a while now  She feels like she is going to fall, but she has not actually fallen  Since the patient is consistently feeling like she is leaning to the right and backwards, this suggest that there may be more going on than just vertigo  She is not having the sensation of motion either  I feel we need to rule out tumor, and examine the interior auditory canals for other problems with this  Has been persistent for greater than 6 weeks  Again, based on this it is most appropriate to get an MRI to rule out tumor  Relevant Orders    MRI brain IAC wo and w contrast    Chronic sinusitis - Primary     Patient did recently have antibiotic treatment  Her blood count was normal with this  Since she improved on antibiotics, I will repeat the course of antibiotics  She is going to get an MRI because of the changes in balance, which would suggest that there could be some further issues  This should also evaluate sinuses  If this is not helpful in the end, i e  Symptoms recur, would recommend follow-up with ENT  Relevant Medications    amoxicillin-clavulanate (Augmentin) 875-125 mg per tablet    Synovial cyst of popliteal space     Recommend follow-up with orthopedics  Has been a problem recently  Relevant Orders    Ambulatory referral to Orthopedic Surgery               Reason for visit is   Chief Complaint   Patient presents with    Fatigue     Pt states she is not feeling much better from her last visit  She states after the last antibiotics she felt better for about a week or so but her symptoms have some back  She is feeling very fatigued, her head feels " foggy" she is not having headaches right now but she had been   kw    Virtual Regular Visit        Encounter provider Justina Gillette, MD    Provider located at [address] [place] [id number]      Recent Visits  No visits were found meeting these conditions  Showing recent visits within past 7 days and meeting all other requirements     Today's Visits  Date Type Provider Dept   01/20/21 Telemedicine Jovany Graves MD [facility]   Showing today's visits and meeting all other requirements     Future Appointments  No visits were found meeting these conditions  Showing future appointments within next 150 days and meeting all other requirements        The patient was identified by name and date of birth  Cirilo Delgado was informed that this is a telemedicine visit and that the visit is being conducted through [employer] and patient was informed that this is not a secure, HIPAA-compliant platform  She agrees to proceed     My office door was closed  No one else was in the room  She acknowledged consent and understanding of privacy and security of the video platform  The patient has agreed to participate and understands they can discontinue the visit at any time  Patient is aware this is a billable service  Cande Anguiano is a 61 y o  female   Chief Complaint   Patient presents with    Fatigue     Pt states she is not feeling much better from her last visit  She states after the last antibiotics she felt better for about a week or so but her symptoms have some back  She is feeling very fatigued, her head feels " foggy" she is not having headaches right now but she had been  kw    Virtual Regular Visit        After last visit, she was on ABX  When she was on them, she was OK  Now, she is OK in AM, but gets some aches in the PM, and her neck as well  Feels like she is in a fog  Baker cyst left knee  She went to Ortho  Was OK for a while, but has some pain on the left from toe to hip  Worse at HS  Is severe pain at night, and prevents sleep    She was at [facility]     She mentioned that her balance is off at times  She will feel that she is falling back a bit at times, and leaning to the side sometimes  Is not dizziness, just the abnormal balance with this  She always leans to the right and slightly back, and feels like she is going to fall  Past Medical History:   Diagnosis Date    Anxiety     Chronic sinusitis     last assessed 2013    Depression     Disease of thyroid gland     hypothyroidism    Grief reaction     last assessed 2017    Scabies     last assessed 3/27/2015    Sciatica     last assessed 2013    Vitamin D deficiency     Wears partial dentures     upper       Past Surgical History:   Procedure Laterality Date    BLADDER SUSPENSION       SECTION      COLONOSCOPY      CT REPAIR OF NASAL SEPTUM N/A 2019    Procedure: SEPTOPLASTY with Collumellar Strut Reconstruction;  Surgeon: Arcelia Pascual DO;  Location: [facility];  Service: ENT    CT STEREOTACTIC COMP ASSIST PROC,CRANIAL,EXTRADURAL Left 2019    Procedure: FUNCTIONAL ENDOSCOPIC SINUS SURGERY (FESS) IMAGED GUIDED; Surgeon: Arcelia Pascual DO;  Location: [facility];  Service: ENT       Current Outpatient Medications   Medication Sig Dispense Refill    b complex vitamins capsule Take 1 capsule by mouth daily      buPROPion (WELLBUTRIN XL) 300 mg 24 hr tablet Take 1 tablet (300 mg total) by mouth every morning 30 tablet 5    calcium carbonate (OS-GABRIELLE) 600 MG tablet Take 600 mg by mouth 2 (two) times a day with meals      cholecalciferol (VITAMIN D3) 1,000 units tablet Take 1,000 Units by mouth daily      levothyroxine 25 mcg tablet TAKE 1 TABLET BY MOUTH EVERY DAY 90 tablet 0    MAGNESIUM PO Take 1 tablet by mouth daily      Omega-3 Fatty Acids (FISH OIL) 1,000 mg Take by mouth      Premarin vaginal cream APPLY 0 5 APPLICATOR EVERY NIGHT AT BEDTIME FOR 2 WKS AND THEN EVERY OTHER NIGHT AS NEEDED        vitamin B-12 (VITAMIN B-12) 1,000 mcg tablet Take by mouth      amoxicillin-clavulanate (Augmentin) 875-125 mg per tablet Take 1 tablet by mouth every 12 (twelve) hours for 10 days 20 tablet 0     No current facility-administered medications for this visit  Allergies   Allergen Reactions    Bactrim [Sulfamethoxazole-Trimethoprim] Hives and Rash    Sulfa Antibiotics Hives and Rash    Pregabalin     Cefuroxime GI Intolerance    Ciprofloxacin Nausea Only    Fiorinal [Butalbital-Aspirin-Caffeine] Headache     Can take meloxican       Review of Systems   Constitutional: Positive for fatigue  HENT:        Per HPI   Musculoskeletal:        Per HPI   Neurological:        Per HPI  Hematological: Negative  Psychiatric/Behavioral: Negative  Video Exam    Vitals:    01/20/21 1905   BP: 128/60   Pulse: 61   Temp: 98 6 °F (37 °C)   Weight: 54 9 kg (121 lb)   Height: 5' 3" (1 6 m)       Physical Exam  Nursing note reviewed  Constitutional:       General: She is not in acute distress  Appearance: She is well-developed  HENT:      Head: Normocephalic and atraumatic  Pulmonary:      Effort: Pulmonary effort is normal       Breath sounds: Normal breath sounds  I spent 20 minutes directly with the patient during this visit      VIRTUAL VISIT 100[address] acknowledges that she has consented to an online visit or consultation  She understands that the online visit is based solely on information provided by her, and that, in the absence of a face-to-face physical evaluation by the physician, the diagnosis she receives is both limited and provisional in terms of accuracy and completeness  This is not intended to replace a full medical face-to-face evaluation by the physician  Kathryn Bunn understands and accepts these terms

## 2021-01-21 NOTE — TELEPHONE ENCOUNTER
Patient called c/o feeling worse than yesterday  Has a headache and feels like she is going to pass out  Per Dr Elizondo Model she needs to go to ER for a full evaluation  I advised 911 but she chose to have a family member drive her    She wants to go to [facility] ER

## 2021-01-21 NOTE — ASSESSMENT & PLAN NOTE
Patient did recently have antibiotic treatment  Her blood count was normal with this  Since she improved on antibiotics, I will repeat the course of antibiotics  She is going to get an MRI because of the changes in balance, which would suggest that there could be some further issues  This should also evaluate sinuses  If this is not helpful in the end, i e  Symptoms recur, would recommend follow-up with ENT

## 2021-01-21 NOTE — ASSESSMENT & PLAN NOTE
Patient has been having the balance issues for a while now  She feels like she is going to fall, but she has not actually fallen  Since the patient is consistently feeling like she is leaning to the right and backwards, this suggest that there may be more going on than just vertigo  She is not having the sensation of motion either  I feel we need to rule out tumor, and examine the interior auditory canals for other problems with this  Has been persistent for greater than 6 weeks  Again, based on this it is most appropriate to get an MRI to rule out tumor

## 2021-01-22 ENCOUNTER — TELEMEDICINE (OUTPATIENT)
Dept: FAMILY MEDICINE CLINIC | Facility: CLINIC | Age: 61
End: 2021-01-22
Payer: COMMERCIAL

## 2021-01-22 VITALS — HEIGHT: 63 IN | BODY MASS INDEX: 21.26 KG/M2 | WEIGHT: 120 LBS

## 2021-01-22 DIAGNOSIS — R51.9 WORSENING HEADACHES: Primary | ICD-10-CM

## 2021-01-22 DIAGNOSIS — E03.9 HYPOTHYROIDISM, UNSPECIFIED TYPE: ICD-10-CM

## 2021-01-22 PROCEDURE — 99213 OFFICE O/P EST LOW 20 MIN: CPT | Performed by: FAMILY MEDICINE

## 2021-01-22 PROCEDURE — 3008F BODY MASS INDEX DOCD: CPT | Performed by: FAMILY MEDICINE

## 2021-01-22 RX ORDER — SUMATRIPTAN 50 MG/1
50 TABLET, FILM COATED ORAL ONCE AS NEEDED
Qty: 9 TABLET | Refills: 0 | Status: SHIPPED | OUTPATIENT
Start: 2021-01-22

## 2021-01-22 NOTE — ASSESSMENT & PLAN NOTE
Patient did have hypothyroidism before  She is not currently on Synthroid  Would recommend recheck TSH in the future as it was elevated with the most recent labs  It is minimally elevated, however  It is about the same as what it was before

## 2021-01-22 NOTE — PATIENT INSTRUCTIONS
Problem List Items Addressed This Visit     Hypothyroidism     Patient did have hypothyroidism before  She is not currently on Synthroid  Would recommend recheck TSH in the future as it was elevated with the most recent labs  It is minimally elevated, however  It is about the same as what it was before  Worsening headaches - Primary     Question if the headache is related to migraine versus tension headache verses sinus  Recommend that she get the Augmentin that was prescribed the other day, also, would recommend that she get the MRI that was ordered pre readmit  Imitrex sent to the pharmacy  This would be a treatment and diagnostic evaluation for migraine headache  Relevant Medications    SUMAtriptan (IMITREX) 50 mg tablet          COVID 19 Instructions    Napolean Span was advised to limit contact with others to essential tasks such as getting food, medications, and medical care  Proper handwashing reviewed, and Hand sanitzer when washing is not available  If the patient develops symptoms of COVID 19, the patient should call the office as soon as possible  For 2836-8975 Flu season, it is strongly recommended that Flu Vaccinations be obtained  Please try to download Google Duo  Once you do download this on your phone, you will be prompted to add your phone number to the account  After that, he should receive a text from [employer], and use that code to verify your phone number  After that, you should be able to use Google Duo to receive and make video calls  Please download Microsoft Teams to your phone or computer  We will be transitioning to this platform for Video Visits  Instructions for downloading this are available from the office  We are committed to getting you vaccinated as soon as possible and will be closely following CDC and [facility]  guidelines as they are released and revised    Please refer to our COVID-19 vaccine webpage for the most up to date information on the vaccine and our distribution efforts      Cy barrera

## 2021-01-22 NOTE — PROGRESS NOTES
Virtual Regular Visit      Assessment/Plan:    Problem List Items Addressed This Visit     Hypothyroidism     Patient did have hypothyroidism before  She is not currently on Synthroid  Would recommend recheck TSH in the future as it was elevated with the most recent labs  It is minimally elevated, however  It is about the same as what it was before  Worsening headaches - Primary     Question if the headache is related to migraine versus tension headache verses sinus  Recommend that she get the Augmentin that was prescribed the other day, also, would recommend that she get the MRI that was ordered pre readmit  Imitrex sent to the pharmacy  This would be a treatment and diagnostic evaluation for migraine headache  Relevant Medications    SUMAtriptan (IMITREX) 50 mg tablet               Reason for visit is   Chief Complaint   Patient presents with    Virtual Brief Visit     Pt states she went to the er for headache, fatigue, vertigo, sob  Er did a covid test but pt did not get results  Pt still has sx  Omid M[facility]od Virtual Regular Visit        Encounter provider Severa Robson, MD    Provider located at [address] [place] [id number]      Recent Visits  Date Type Provider Dept   01/21/21 Telephone 1202 [facility] Primary Care   01/20/21 [address] [place] [address] Primary Care   Showing recent visits within past 7 days and meeting all other requirements     Today's Visits  Date Type Provider Dept   01/22/21 Telemedicine Severa Robson, MD [facility] Primary Care   Showing today's visits and meeting all other requirements     Future Appointments  No visits were found meeting these conditions  Showing future appointments within next 150 days and meeting all other requirements        The patient was identified by name and date of birth   Yousif [facility] was informed that this is a telemedicine visit and that the visit is being conducted through [employer] and patient was informed that this is not a secure, HIPAA-compliant platform  She agrees to proceed     My office door was closed  No one else was in the room  She acknowledged consent and understanding of privacy and security of the video platform  The patient has agreed to participate and understands they can discontinue the visit at any time  Patient is aware this is a billable service  Vicki Smith is a 61 y o  female   Chief Complaint   Patient presents with    Virtual Brief Visit     Pt states she went to the er for headache, fatigue, vertigo, sob  Er did a covid test but pt did not get results  Pt still has sx  Johana Daugherty Virtual Regular Visit        Patient was feeling bad yesterday and went to the ER  COVID negative  She was fatigued and near syncope  Patient has been having daily headaches with this  Night before Last night when it woke her up, she had left-sided headache  She does note some pain in back of the neck along with a, with shooting pains when she twists  She has had some double vision at times, but after being on computer all day  When the headache starts, it seems to then generalized to her entire body  She has had some shooting pains on the back of the head  Feels like a shock  She has some changes in the face with this           Past Medical History:   Diagnosis Date    Anxiety     Chronic sinusitis     last assessed 2013    Depression     Disease of thyroid gland     hypothyroidism    Grief reaction     last assessed 2017    Scabies     last assessed 3/27/2015    Sciatica     last assessed 2013    Vitamin D deficiency     Wears partial dentures     upper       Past Surgical History:   Procedure Laterality Date    BLADDER SUSPENSION       SECTION      COLONOSCOPY      [place] REPAIR OF NASAL SEPTUM N/A 2019    Procedure: SEPTOPLASTY with Collumellar Strut Reconstruction; Surgeon: Karl Farley DO;  [facility];  Service: ENT    [place] STEREOTACTIC COMP ASSIST PROC,CRANIAL,EXTRADURAL Left 9/11/2019    Procedure: FUNCTIONAL ENDOSCOPIC SINUS SURGERY (FESS) IMAGED GUIDED; Surgeon: Karl Farley DO;  [facility];  Service: ENT       Current Outpatient Medications   Medication Sig Dispense Refill    b complex vitamins capsule Take 1 capsule by mouth daily      buPROPion (WELLBUTRIN XL) 300 mg 24 hr tablet Take 1 tablet (300 mg total) by mouth every morning 30 tablet 5    calcium carbonate (OS-GABRIELLE) 600 MG tablet Take 600 mg by mouth 2 (two) times a day with meals      cholecalciferol (VITAMIN D3) 1,000 units tablet Take 1,000 Units by mouth daily      levothyroxine 25 mcg tablet TAKE 1 TABLET BY MOUTH EVERY DAY 90 tablet 0    MAGNESIUM PO Take 1 tablet by mouth daily      Omega-3 Fatty Acids (FISH OIL) 1,000 mg Take by mouth      Premarin vaginal cream APPLY 0 5 APPLICATOR EVERY NIGHT AT BEDTIME FOR 2 WKS AND THEN EVERY OTHER NIGHT AS NEEDED   vitamin B-12 (VITAMIN B-12) 1,000 mcg tablet Take by mouth      amoxicillin-clavulanate (Augmentin) 875-125 mg per tablet Take 1 tablet by mouth every 12 (twelve) hours for 10 days (Patient not taking: Reported on 1/22/2021) 20 tablet 0    SUMAtriptan (IMITREX) 50 mg tablet Take 1 tablet (50 mg total) by mouth once as needed for migraine for up to 1 dose May repeat in 1 hour 9 tablet 0     No current facility-administered medications for this visit  Allergies   Allergen Reactions    Bactrim [Sulfamethoxazole-Trimethoprim] Hives and Rash    Sulfa Antibiotics Hives and Rash    Pregabalin     Cefuroxime GI Intolerance    Ciprofloxacin Nausea Only    Fiorinal [Butalbital-Aspirin-Caffeine] Headache     Can take meloxican       Review of Systems   Constitutional: Positive for activity change, appetite change and fatigue  HENT: Positive for congestion  Musculoskeletal: Negative  Skin: Negative  Neurological: Positive for light-headedness and headaches  Psychiatric/Behavioral: Negative  All other systems reviewed and are negative  Video Exam    Vitals:    01/22/21 0820   Weight: 54 4 kg (120 lb)   Height: 5' 3" (1 6 m)       Physical Exam  Nursing note reviewed  Constitutional:       General: She is not in acute distress  Appearance: She is well-developed  HENT:      Head: Normocephalic and atraumatic  Pulmonary:      Effort: Pulmonary effort is normal       Breath sounds: Normal breath sounds  I spent 20 minutes directly with the patient during this visit      VIRTUAL VISIT [address] acknowledges that she has consented to an online visit or consultation  She understands that the online visit is based solely on information provided by her, and that, in the absence of a face-to-face physical evaluation by the physician, the diagnosis she receives is both limited and provisional in terms of accuracy and completeness  This is not intended to replace a full medical face-to-face evaluation by the physician  Taz Villegas understands and accepts these terms

## 2021-01-22 NOTE — ASSESSMENT & PLAN NOTE
Question if the headache is related to migraine versus tension headache verses sinus  Recommend that she get the Augmentin that was prescribed the other day, also, would recommend that she get the MRI that was ordered pre readmit  Imitrex sent to the pharmacy  This would be a treatment and diagnostic evaluation for migraine headache

## 2021-01-25 ENCOUNTER — TELEMEDICINE (OUTPATIENT)
Dept: FAMILY MEDICINE CLINIC | Facility: CLINIC | Age: 61
End: 2021-01-25
Payer: COMMERCIAL

## 2021-01-25 VITALS — OXYGEN SATURATION: 98 % | TEMPERATURE: 96.3 F

## 2021-01-25 DIAGNOSIS — M67.40 GANGLION, JOINT: Primary | ICD-10-CM

## 2021-01-25 DIAGNOSIS — R51.9 WORSENING HEADACHES: ICD-10-CM

## 2021-01-25 PROCEDURE — 1036F TOBACCO NON-USER: CPT | Performed by: FAMILY MEDICINE

## 2021-01-25 PROCEDURE — 99213 OFFICE O/P EST LOW 20 MIN: CPT | Performed by: FAMILY MEDICINE

## 2021-01-25 RX ORDER — NAPROXEN SODIUM 220 MG
440 TABLET ORAL EVERY 12 HOURS PRN
Start: 2021-01-25

## 2021-01-25 NOTE — PATIENT INSTRUCTIONS
Problem List Items Addressed This Visit     Ganglion, joint - Primary     Baker cyst appears to have ruptured at this point  She no longer has pain  There is some bruising noted, but not severe pain with it  If the bruising continues to be a problem, she can certainly follow up at this office  As far as going to Orthopedics, there really is no further treatment needed at this point  Worsening headaches     With Imitrex, Augmentin, patient's headache seemed to have gotten better  I recommend that she try to limit the amount of Imitrex that she needs to use, but she can certainly add Aleve 2 pills twice a day, or Advil for pills 4 times a day as needed  Advil is the same as ibuprofen, Motrin  Relevant Medications    naproxen sodium (ALEVE) 220 MG tablet          COVID 19 Instructions    Simeon Mercedes was advised to limit contact with others to essential tasks such as getting food, medications, and medical care  Proper handwashing reviewed, and Hand sanitzer when washing is not available  If the patient develops symptoms of COVID 19, the patient should call the office as soon as possible  For [id number] Flu season, it is strongly recommended that Flu Vaccinations be obtained  Please try to download Google Duo  Once you do download this on your phone, you will be prompted to add your phone number to the account  After that, he should receive a text from [employer], and use that code to verify your phone number  After that, you should be able to use Google Duo to receive and make video calls  Please download Microsoft Teams to your phone or computer  We will be transitioning to this platform for Video Visits  Instructions for downloading this are available from the office  We are committed to getting you vaccinated as soon as possible and will be closely following CDC and [facility]  guidelines as they are released and revised    Please refer to our COVID-19 vaccine webpage for the most up to date information on the vaccine and our distribution efforts      Cy tn

## 2021-01-25 NOTE — ASSESSMENT & PLAN NOTE
With Imitrex, Augmentin, patient's headache seemed to have gotten better  I recommend that she try to limit the amount of Imitrex that she needs to use, but she can certainly add Aleve 2 pills twice a day, or Advil for pills 4 times a day as needed  Advil is the same as ibuprofen, Motrin

## 2021-01-25 NOTE — PROGRESS NOTES
Virtual Regular Visit      Assessment/Plan:    Problem List Items Addressed This Visit     Ganglion, joint - Primary     Baker cyst appears to have ruptured at this point  She no longer has pain  There is some bruising noted, but not severe pain with it  If the bruising continues to be a problem, she can certainly follow up at this office  As far as going to Orthopedics, there really is no further treatment needed at this point  Worsening headaches     With Imitrex, Augmentin, patient's headache seemed to have gotten better  I recommend that she try to limit the amount of Imitrex that she needs to use, but she can certainly add Aleve 2 pills twice a day, or Advil for pills 4 times a day as needed  Advil is the same as ibuprofen, Motrin  Reason for visit is   Chief Complaint   Patient presents with    Virtual Regular Visit     Follow up from ER  She continues with pain in the back of her neck and headaches  mjs    Virtual Regular Visit        Encounter provider Loly Merida MD    Provider located at [address] [place] [id number]      Recent Visits  Date Type Provider Dept   01/22/21 [address] [place] [address], [address] [place] [address] Primary Care   01/21/21 Telephone 1202 [facility] Primary Care   01/20/21 Telemedicine Loly Merida  [address] Primary Care   Showing recent visits within past 7 days and meeting all other requirements     Today's Visits  Date Type Provider Dept   01/25/21 Telemedicine Loly Merida MD [facility] Primary Care   Showing today's visits and meeting all other requirements     Future Appointments  No visits were found meeting these conditions  Showing future appointments within next 150 days and meeting all other requirements        The patient was identified by name and date of birth   Marisol Etienne was informed that this is a telemedicine visit and that the visit is being conducted through [employer] and patient was informed that this is not a secure, HIPAA-compliant platform  She agrees to proceed     My office door was closed  No one else was in the room  She acknowledged consent and understanding of privacy and security of the video platform  The patient has agreed to participate and understands they can discontinue the visit at any time  Patient is aware this is a billable service  Earlyne Claude is a 61 y o  female   Chief Complaint   Patient presents with    Virtual Regular Visit     Follow up from ER  She continues with pain in the back of her neck and headaches  mjs    Virtual Regular Visit        Patient seen for follow up on the HA  Imitrex has helped  Took at 11:30, and notes some discomfort with this  Much better than Thursday  She did have baker cyst before  but now the pain is gone, and has black and blue  Past Medical History:   Diagnosis Date    Anxiety     Chronic sinusitis     last assessed 2013    Depression     Disease of thyroid gland     hypothyroidism    Grief reaction     last assessed 2017    Scabies     last assessed 3/27/2015    Sciatica     last assessed 2013    Vitamin D deficiency     Wears partial dentures     upper       Past Surgical History:   Procedure Laterality Date    BLADDER SUSPENSION       SECTION      COLONOSCOPY      [place] REPAIR OF NASAL SEPTUM N/A 2019    Procedure: SEPTOPLASTY with Collumellar Strut Reconstruction;  Surgeon: Mayra Gusman DO;  [facility];  Service: ENT    [place] STEREOTACTIC COMP ASSIST PROC,CRANIAL,EXTRADURAL Left 2019    Procedure: FUNCTIONAL ENDOSCOPIC SINUS SURGERY (FESS) IMAGED GUIDED; Surgeon:  Mayra Gusman DO;  [facility];  Service: ENT       Current Outpatient Medications   Medication Sig Dispense Refill    amoxicillin-clavulanate (Augmentin) 875-125 mg per tablet Take 1 tablet by mouth every 12 (twelve) hours for 10 days 20 tablet 0    b complex vitamins capsule Take 1 capsule by mouth daily      buPROPion (WELLBUTRIN XL) 300 mg 24 hr tablet Take 1 tablet (300 mg total) by mouth every morning 30 tablet 5    calcium carbonate (OS-GABRIELLE) 600 MG tablet Take 600 mg by mouth 2 (two) times a day with meals      cholecalciferol (VITAMIN D3) 1,000 units tablet Take 1,000 Units by mouth daily      levothyroxine 25 mcg tablet TAKE 1 TABLET BY MOUTH EVERY DAY 90 tablet 0    MAGNESIUM PO Take 1 tablet by mouth daily      Omega-3 Fatty Acids (FISH OIL) 1,000 mg Take by mouth      Premarin vaginal cream APPLY 0 5 APPLICATOR EVERY NIGHT AT BEDTIME FOR 2 WKS AND THEN EVERY OTHER NIGHT AS NEEDED   SUMAtriptan (IMITREX) 50 mg tablet Take 1 tablet (50 mg total) by mouth once as needed for migraine for up to 1 dose May repeat in 1 hour 9 tablet 0    vitamin B-12 (VITAMIN B-12) 1,000 mcg tablet Take by mouth       No current facility-administered medications for this visit  Allergies   Allergen Reactions    Bactrim [Sulfamethoxazole-Trimethoprim] Hives and Rash    Sulfa Antibiotics Hives and Rash    Pregabalin     Cefuroxime GI Intolerance    Ciprofloxacin Nausea Only    Fiorinal [Butalbital-Aspirin-Caffeine] Headache     Can take meloxican       Review of Systems   Constitutional: Negative  HENT: Negative  Respiratory: Negative  Cardiovascular: Negative  Neurological: Positive for headaches  All other systems reviewed and are negative  Video Exam    Vitals:    01/25/21 1446   Temp: (!) 96 3 °F (35 7 °C)   SpO2: 98%       Physical Exam  Nursing note reviewed  Constitutional:       General: She is not in acute distress  Appearance: She is well-developed  HENT:      Head: Normocephalic and atraumatic  Pulmonary:      Effort: Pulmonary effort is normal       Breath sounds: Normal breath sounds            I spent 15 minutes directly with the patient during this visit      VIRTUAL VISIT DISCLAIMER    Mahnaz Joyce acknowledges that she has consented to an online visit or consultation  She understands that the online visit is based solely on information provided by her, and that, in the absence of a face-to-face physical evaluation by the physician, the diagnosis she receives is both limited and provisional in terms of accuracy and completeness  This is not intended to replace a full medical face-to-face evaluation by the physician  Mahnaz Joyce understands and accepts these terms

## 2021-02-02 ENCOUNTER — HOSPITAL ENCOUNTER (OUTPATIENT)
Dept: MRI IMAGING | Facility: HOSPITAL | Age: 61
Discharge: HOME | End: 2021-02-02
Payer: COMMERCIAL

## 2021-02-02 DIAGNOSIS — R26.89 ABNORMALITY OF GAIT DUE TO IMPAIRMENT OF BALANCE: ICD-10-CM

## 2021-02-02 PROCEDURE — G1004 CDSM NDSC: HCPCS

## 2021-02-02 PROCEDURE — 70553 MRI BRAIN STEM W/O & W/DYE: CPT

## 2021-02-02 PROCEDURE — A9585 GADOBUTROL INJECTION: HCPCS | Performed by: FAMILY MEDICINE

## 2021-02-02 RX ADMIN — GADOBUTROL 6 ML: 604.72 INJECTION INTRAVENOUS at 13:21

## 2021-02-04 ENCOUNTER — TELEMEDICINE (OUTPATIENT)
Dept: FAMILY MEDICINE CLINIC | Facility: CLINIC | Age: 61
End: 2021-02-04
Payer: COMMERCIAL

## 2021-02-04 DIAGNOSIS — E78.2 MIXED HYPERLIPIDEMIA: ICD-10-CM

## 2021-02-04 DIAGNOSIS — R51.9 WORSENING HEADACHES: ICD-10-CM

## 2021-02-04 DIAGNOSIS — R90.82 WHITE MATTER DISEASE: Primary | ICD-10-CM

## 2021-02-04 DIAGNOSIS — R42 DIZZY SPELLS: ICD-10-CM

## 2021-02-04 PROBLEM — N81.10 FEMALE BLADDER PROLAPSE: Status: ACTIVE | Noted: 2020-09-27

## 2021-02-04 PROBLEM — E78.5 HYPERLIPIDEMIA: Status: ACTIVE | Noted: 2019-04-29

## 2021-02-04 PROBLEM — N95.2 ATROPHIC VAGINITIS: Status: ACTIVE | Noted: 2020-09-27

## 2021-02-04 PROBLEM — R92.8 ABNORMAL MAMMOGRAM: Status: ACTIVE | Noted: 2020-11-30

## 2021-02-04 PROCEDURE — 99214 OFFICE O/P EST MOD 30 MIN: CPT | Performed by: FAMILY MEDICINE

## 2021-02-04 PROCEDURE — 1036F TOBACCO NON-USER: CPT | Performed by: FAMILY MEDICINE

## 2021-02-04 NOTE — ASSESSMENT & PLAN NOTE
Did have elevated cholesterol on labs in January of 2020  Given that there is some changes on the MRI, and high cholesterol from before, it is likely she should restart statins  She preferred to recheck the level, and if it is abnormal she will certainly start them at that point  Given that it was a year ago that she had the blood work done, this does seem reasonable, therefore I will order lipid panel  She also just had a comprehensive metabolic profile, so I will not order that

## 2021-02-04 NOTE — PROGRESS NOTES
Virtual Regular Visit      Assessment/Plan:    Problem List Items Addressed This Visit     Dizzy spells     Patient doing better  She is not having severe problems  Still has some issues at times  Relevant Orders    Ambulatory referral to Physical Therapy    Hyperlipidemia     Did have elevated cholesterol on labs in January of 2020  Given that there is some changes on the MRI, and high cholesterol from before, it is likely she should restart statins  She preferred to recheck the level, and if it is abnormal she will certainly start them at that point  Given that it was a year ago that she had the blood work done, this does seem reasonable, therefore I will order lipid panel  She also just had a comprehensive metabolic profile, so I will not order that  Relevant Orders    Lipid Panel with Direct LDL reflex    White matter disease - Primary     Noted on MRI  Look to reduce risk factors, like cholesterol, blood sugar, and blood pressure  Cholesterol repeat ordered  BP good before  Blood Sugar was normal in Jan 2021  Relevant Orders    Lipid Panel with Direct LDL reflex    Worsening headaches     Continues with HA  Is better, but not normal   Used treatment for Migraine  Patient has had some neck pain for a while now  May be that she is having tension headaches from this  Consider physical therapy  Relevant Orders    Ambulatory referral to Physical Therapy               Reason for visit is   Chief Complaint   Patient presents with    Virtual Regular Visit    Follow-up    Virtual Regular Visit        Encounter provider Severa Robson, MD    Provider located at [address] [place] [address] PRIMARY CARE  Consuelo PERKINS [phone]      Recent Visits  No visits were found meeting these conditions     Showing recent visits within past 7 days and meeting all other requirements     Today's Visits  Date Type Provider Dept   02/04/21 Telemedicine Nayla Handley Osmel Santana, [address] [place] [address] Primary Care   Showing today's visits and meeting all other requirements     Future Appointments  No visits were found meeting these conditions  Showing future appointments within next 150 days and meeting all other requirements        The patient was identified by name and date of birth  Latha Díaz was informed that this is a telemedicine visit and that the visit is being conducted through [employer] and patient was informed that this is not a secure, HIPAA-compliant platform  She agrees to proceed     My office door was closed  No one else was in the room  She acknowledged consent and understanding of privacy and security of the video platform  The patient has agreed to participate and understands they can discontinue the visit at any time  Patient is aware this is a billable service  Govind Fried is a 61 y o  female   Chief Complaint   Patient presents with    Virtual Regular Visit    Follow-up    Virtual Regular Visit        MRI reviewed  She has had some problems with her concerns about memory  She is still having HA and neck pain  Pain is back of neck up into head  Pain is the biggest concern  Reviewed hyperlipidemia  Patient was on atorvastatin at 1 point, but she did not continue it as she was unsure if she was able to take it with antibiotics  Last cholesterol was approximately 1 year ago             Past Medical History:   Diagnosis Date    Anxiety     Chronic sinusitis     last assessed 2013    Depression     Disease of thyroid gland     hypothyroidism    Grief reaction     last assessed 2017    Scabies     last assessed 3/27/2015    Sciatica     last assessed 2013    Vitamin D deficiency     Wears partial dentures     upper       Past Surgical History:   Procedure Laterality Date    BLADDER SUSPENSION       SECTION      COLONOSCOPY      [place] REPAIR OF NASAL SEPTUM N/A 2019    Procedure: SEPTOPLASTY with Collumellar Strut Reconstruction;  Surgeon: Delano Aleman DO;  [facility];  Service: ENT    [place] STEREOTACTIC COMP ASSIST PROC,CRANIAL,EXTRADURAL Left 9/11/2019    Procedure: FUNCTIONAL ENDOSCOPIC SINUS SURGERY (FESS) IMAGED GUIDED; Surgeon: Delano Aleman DO;  [facility];  Service: ENT       Current Outpatient Medications   Medication Sig Dispense Refill    b complex vitamins capsule Take 1 capsule by mouth daily      buPROPion (WELLBUTRIN XL) 300 mg 24 hr tablet Take 1 tablet (300 mg total) by mouth every morning 30 tablet 5    calcium carbonate (OS-GABRIELLE) 600 MG tablet Take 600 mg by mouth 2 (two) times a day with meals      cholecalciferol (VITAMIN D3) 1,000 units tablet Take 1,000 Units by mouth daily      levothyroxine 25 mcg tablet TAKE 1 TABLET BY MOUTH EVERY DAY 90 tablet 0    MAGNESIUM PO Take 1 tablet by mouth daily      naproxen sodium (ALEVE) 220 MG tablet Take 2 tablets (440 mg total) by mouth every 12 (twelve) hours as needed for mild pain      Omega-3 Fatty Acids (FISH OIL) 1,000 mg Take by mouth      Premarin vaginal cream APPLY 0 5 APPLICATOR EVERY NIGHT AT BEDTIME FOR 2 WKS AND THEN EVERY OTHER NIGHT AS NEEDED   SUMAtriptan (IMITREX) 50 mg tablet Take 1 tablet (50 mg total) by mouth once as needed for migraine for up to 1 dose May repeat in 1 hour 9 tablet 0    vitamin B-12 (VITAMIN B-12) 1,000 mcg tablet Take by mouth       No current facility-administered medications for this visit  Allergies   Allergen Reactions    Bactrim [Sulfamethoxazole-Trimethoprim] Hives and Rash    Sulfa Antibiotics Hives and Rash    Pregabalin     Cefuroxime GI Intolerance    Ciprofloxacin Nausea Only    Fiorinal [Butalbital-Aspirin-Caffeine] Headache     Can take meloxican       Review of Systems   Constitutional: Negative  HENT: Negative  Eyes: Negative  Respiratory: Negative  Cardiovascular: Negative  Gastrointestinal: Negative  Endocrine: Negative  Genitourinary: Negative  Musculoskeletal: Negative  Skin: Negative  Allergic/Immunologic: Negative  Neurological: Negative  Hematological: Negative  Psychiatric/Behavioral: Negative  Video Exam    There were no vitals filed for this visit  Physical Exam  Nursing note reviewed  Constitutional:       General: She is not in acute distress  Appearance: She is well-developed  HENT:      Head: Normocephalic and atraumatic  Pulmonary:      Effort: Pulmonary effort is normal       Breath sounds: Normal breath sounds  I spent 20 minutes directly with the patient during this visit      VIRTUAL VISIT [address] acknowledges that she has consented to an online visit or consultation  She understands that the online visit is based solely on information provided by her, and that, in the absence of a face-to-face physical evaluation by the physician, the diagnosis she receives is both limited and provisional in terms of accuracy and completeness  This is not intended to replace a full medical face-to-face evaluation by the physician  Chucho Rodriguez understands and accepts these terms

## 2021-02-04 NOTE — PATIENT INSTRUCTIONS
Problem List Items Addressed This Visit     Dizzy spells     Patient doing better  She is not having severe problems  Still has some issues at times  Relevant Orders    Ambulatory referral to Physical Therapy    Hyperlipidemia     Did have elevated cholesterol on labs in January of 2020  Given that there is some changes on the MRI, and high cholesterol from before, it is likely she should restart statins  She preferred to recheck the level, and if it is abnormal she will certainly start them at that point  Given that it was a year ago that she had the blood work done, this does seem reasonable, therefore I will order lipid panel  She also just had a comprehensive metabolic profile, so I will not order that  Relevant Orders    Lipid Panel with Direct LDL reflex    White matter disease - Primary     Noted on MRI  Look to reduce risk factors, like cholesterol, blood sugar, and blood pressure  Cholesterol repeat ordered  BP good before  Blood Sugar was normal in Jan 2021  Relevant Orders    Lipid Panel with Direct LDL reflex    Worsening headaches     Continues with HA  Is better, but not normal   Used treatment for Migraine  Patient has had some neck pain for a while now  May be that she is having tension headaches from this  Consider physical therapy  Relevant Orders    Ambulatory referral to Physical Therapy          COVID 19 Instructions    Alexandria Rojo was advised to limit contact with others to essential tasks such as getting food, medications, and medical care  Proper handwashing reviewed, and Hand sanitzer when washing is not available  If the patient develops symptoms of COVID 19, the patient should call the office as soon as possible  For 3965-2363 Flu season, it is strongly recommended that Flu Vaccinations be obtained  Please try to download [employer] Duo    Once you do download this on your phone, you will be prompted to add your phone number to the account  After that, he should receive a text from [employer], and use that code to verify your phone number  After that, you should be able to use Google Duo to receive and make video calls  Please download Microsoft Teams to your phone or computer  We will be transitioning to this platform for Video Visits  Instructions for downloading this are available from the office  We are committed to getting you vaccinated as soon as possible and will be closely following CDC and [facility]  guidelines as they are released and revised  Please refer to our COVID-19 vaccine webpage for the most up to date information on the vaccine and our distribution efforts      Cy barrera

## 2021-02-04 NOTE — ASSESSMENT & PLAN NOTE
Continues with HA  Is better, but not normal   Used treatment for Migraine  Patient has had some neck pain for a while now  May be that she is having tension headaches from this  Consider physical therapy

## 2021-02-04 NOTE — ASSESSMENT & PLAN NOTE
Noted on MRI  Look to reduce risk factors, like cholesterol, blood sugar, and blood pressure  Cholesterol repeat ordered  BP good before  Blood Sugar was normal in Jan 2021

## 2021-02-15 ENCOUNTER — LAB (OUTPATIENT)
Dept: LAB | Facility: CLINIC | Age: 61
End: 2021-02-15
Payer: COMMERCIAL

## 2021-02-15 ENCOUNTER — EVALUATION (OUTPATIENT)
Dept: PHYSICAL THERAPY | Facility: REHABILITATION | Age: 61
End: 2021-02-15
Payer: COMMERCIAL

## 2021-02-15 DIAGNOSIS — R42 DIZZY SPELLS: ICD-10-CM

## 2021-02-15 DIAGNOSIS — R51.9 WORSENING HEADACHES: Primary | ICD-10-CM

## 2021-02-15 DIAGNOSIS — E78.2 MIXED HYPERLIPIDEMIA: ICD-10-CM

## 2021-02-15 DIAGNOSIS — R90.82 WHITE MATTER DISEASE: ICD-10-CM

## 2021-02-15 LAB
CHOLEST SERPL-MCNC: 217 MG/DL (ref 50–200)
HDLC SERPL-MCNC: 54 MG/DL
LDLC SERPL CALC-MCNC: 145 MG/DL (ref 0–100)
TRIGL SERPL-MCNC: 88 MG/DL

## 2021-02-15 PROCEDURE — 36415 COLL VENOUS BLD VENIPUNCTURE: CPT

## 2021-02-15 PROCEDURE — 80061 LIPID PANEL: CPT

## 2021-02-15 PROCEDURE — 97162 PT EVAL MOD COMPLEX 30 MIN: CPT | Performed by: PHYSICAL THERAPIST

## 2021-02-15 PROCEDURE — 97110 THERAPEUTIC EXERCISES: CPT | Performed by: PHYSICAL THERAPIST

## 2021-02-15 NOTE — PROGRESS NOTES
PT Evaluation     Today's date: 2/15/2021  Patient name: Toña Marcus  : 1960  MRN: [id number]  Referring provider: Mary Liz MD  Dx:   Encounter Diagnosis     ICD-10-CM    1  Worsening headaches  R51 9 Ambulatory referral to Physical Therapy   2  Dizzy spells  R42 Ambulatory referral to Physical Therapy                  Assessment  Assessment details: Tatyana Barbour reports to PT with CC of headaches with cervicalgia  Results of eval indicate tenderness to cervical musculature, impaired postured, limited cervical AROM particularly in upper cervical spine, and limited intervertebral mobility  Testing negative for VBI or upper cervical ligament instability  Finding are consistent with cervicogenic headaches  She also demonstrated good balance with and with out visual feed back, and normal gait pattern  Will continue to monitor for any sings or symptoms related to pre-syncope or dysequilibrium to R side, however is doing well today  Her impairments are resulting in difficulty with sitting for extended periods of time to perform her work duties at computer  She would benefit from skilled PT to address these deficits and maximize function  Barriers to therapy: Depression   Understanding of Dx/Px/POC: good   Prognosis: good    Goals  STGs (4 weeks)  Pt will be independent with comprehensive HEP   Pt will demonstrate improved cervical AROM rotation to R by 10 degrees  Pt will report reduced average headache pain by at least 3 points  LTG's (to be achieved by d/c)  Pt will be able to self manage sx's independently   Pt will show pain free cervical AROM  Pt will report no more than 1 head ache per week  Pt will report no difficulty with performing her work duties at [employer] details: Initiate POC     Patient would benefit from: skilled physical therapy  Planned modality interventions: thermotherapy: hydrocollator packs and cryotherapy  Planned therapy interventions: home exercise program, therapeutic exercise, therapeutic activities, stretching, strengthening, patient education, postural training, neuromuscular re-education, manual therapy and joint mobilization  Frequency: 2x week  Duration in weeks: 8  Plan of Care beginning date: 2/15/2021  Plan of Care expiration date: 2021  Treatment plan discussed with: patient        Subjective Evaluation    History of Present Illness  Mechanism of injury: Headaches and lightheadedness (pre syncope)  Nothing note worthy at ED  MRI shows some white matter lesions  She has neck pain  Since 1 month ago her headaches started returning along with head aches  She denies pre-syncope since  On 21 went to ED because she felt very lightheaded, unwell, and had a bad headache  She was cleared and sent home  Had an MRI which showed some diffuse white matter changes  She is taking tyelonol for her headaches right now which is helping  She has a secondary complaint of feeling like she is off balance to her R when she is standing and moving around  She denies dizziness, and says this doesn't seem related to her pre-syncope  Pain is located primarily in subocciptal region  States if she rotates her head her neck hurts most  Pain is worse with using her computer  If she is having pain will take tylenol  She denies paresthesia's or pain radiating in UE's  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  She has to work on a computer a lot which is causing pain             Pain  Current pain rating: 3  At best pain ratin  At worst pain ratin  Quality: dull ache and pulling          Objective    Vitals  BP - 128/75  HR - 69    Flowsheet Rows      Most Recent Value   PT/OT G-Codes   Current Score  94   Projected Score  93         Changes in bowel/bladder function?: no     Ligament Laxity Testing  Alar Ligament:  Transverse Ligament: neg    VBI: neg    Cervical Palpation: suboccipital region b/l and R paraspinals and upper trap are tender to palation Cervical Posture: mild forward head rounded shoulders  Upper Quarter Screen   Dermatomal: intact    Myotomal: intact    Reflexes: NT    Cervical Range of Motion     Flexion 60 pain    Extension 40     Left Right   Lateral Flexion 20 pain 18 pain   Rotation 65 58 pain       PAIVMs: hypomobilitty through out and pain at C3 and C6    Compression Test: neg    Spurling A: neg    Distraction Test: neg    Upper cervical rotation test: pos, impaired mobility to R side     Gait Analysis: Demonstrates normal gait pattern, no abnormalities noted       MCTSIB  Stage 1: 30s  Stage 2: 30s  Stage 3: 30s  Stage 4: 30s       Precautions: depression      Manuals 2/15            SoR             IASTM              Mobs                           Neuro Re-Ed             Scap squeeze             Rows              Low rows                                                                  Ther Ex             Chin tucks  Seated 10x 5"            Upper trap stretch 20"x3 ea            Pec stretch              Towel snags              Seated T/s ext                                                    Ther Activity                                       Gait Training                                       Modalities

## 2021-02-22 ENCOUNTER — APPOINTMENT (OUTPATIENT)
Dept: PHYSICAL THERAPY | Facility: REHABILITATION | Age: 61
End: 2021-02-22
Payer: COMMERCIAL

## 2021-02-23 ENCOUNTER — OFFICE VISIT (OUTPATIENT)
Dept: PHYSICAL THERAPY | Facility: REHABILITATION | Age: 61
End: 2021-02-23
Payer: COMMERCIAL

## 2021-02-23 DIAGNOSIS — R51.9 WORSENING HEADACHES: Primary | ICD-10-CM

## 2021-02-23 DIAGNOSIS — R42 DIZZY SPELLS: ICD-10-CM

## 2021-02-23 PROCEDURE — 97140 MANUAL THERAPY 1/> REGIONS: CPT | Performed by: PHYSICAL THERAPIST

## 2021-02-23 PROCEDURE — 97112 NEUROMUSCULAR REEDUCATION: CPT | Performed by: PHYSICAL THERAPIST

## 2021-02-23 PROCEDURE — 97110 THERAPEUTIC EXERCISES: CPT | Performed by: PHYSICAL THERAPIST

## 2021-02-23 NOTE — PROGRESS NOTES
Daily Note     Today's date: 2021  Patient name: Divya Monroe  : 1960  MRN: [id number]  Referring provider: Ronnie Phipps MD  Dx:   Encounter Diagnosis     ICD-10-CM    1  Worsening headaches  R51 9    2  Dizzy spells  R42                   Subjective: Reports neck pain has been about the same  Was with more pain following her eval day  No headache currently  Objective: See treatment diary below      Assessment: Tolerated treatment well  Most benefit from IASTM with manual interventions today  Able to complete exercises w/o any pain, but needing cues to perform properly particularly with activating mid/lower traps  Difficulty with stretching L upper traps dur to pain with R side bending, will hold for now  Patient would benefit from continued PT      Plan: Continue per plan of care        Precautions: depression      Manuals 2/15 2/23           SoR  6'           IASTM   6'           Mobs              Manual traction   2'           Neuro Re-Ed             Scap squeeze  10x 5"           Rows   2x10           Low rows   2x10                                                               Ther Ex             Chin tucks  Seated 10x 5" Seated 10x 5"    Supine 10x 5"           Upper trap stretch 20"x3 ea 20"x3 for R side           Pec stretch   20"x3 doorway           Towel snags   10x 5" ea           Seated T/s ext                                                    Ther Activity                                       Gait Training                                       Modalities

## 2021-02-24 ENCOUNTER — OFFICE VISIT (OUTPATIENT)
Dept: PHYSICAL THERAPY | Facility: REHABILITATION | Age: 61
End: 2021-02-24
Payer: COMMERCIAL

## 2021-02-24 DIAGNOSIS — R51.9 WORSENING HEADACHES: Primary | ICD-10-CM

## 2021-02-24 DIAGNOSIS — R42 DIZZY SPELLS: ICD-10-CM

## 2021-02-24 PROCEDURE — 97140 MANUAL THERAPY 1/> REGIONS: CPT

## 2021-02-24 PROCEDURE — 97112 NEUROMUSCULAR REEDUCATION: CPT

## 2021-02-24 NOTE — PROGRESS NOTES
Daily Note     Today's date: 2021  Patient name: Marisol Etienne  : 1960  MRN: [id number]  Referring provider: Ted Felipe MD  Dx:   Encounter Diagnosis     ICD-10-CM    1  Worsening headaches  R51 9    2  Dizzy spells  R42        Start Time: 224  Stop Time: 1800  Total time in clinic (min): 45 minutes      * Patient with late arrival due to traffic  Rebekah Burgosmckennajuan diego performed Manual intervention first 15 minutes  Subjective: Reports neck pain has been about the same  Was with more pain following her eval day  No headache currently  Objective: See treatment diary below      Assessment: Tolerated treatment well  Required verbal cues and tactile facilitation as for proper body mechanics and exercise technique  Added thoracic extension and scapular add with OTB  No reports of discomfort at this time  Patient reports fatigue post intervention and would benefit from continued PT      Plan: Continue per plan of care        Precautions: depression      Manuals 2/15 2/23 2/24/21          SoR  6' 6  PM          IASTM   6' STM   PM          Mobs              Manual traction   2' 2'  PM          Neuro Re-Ed             Scap squeeze  10x 5" 10 x 5"          Rows   2x10 2 x :10 OTB           Low rows   2x10 2 x :10 OTB          Horizontal add   1 x 10 OTB education                                                 Ther Ex             Chin tucks  Seated 10x 5" Seated 10x 5"     Supine 10x 5"          Upper trap stretch 20"x3 ea 20"x3 for R side 20"x 4 for B side          Rotation   4 x :10 Bsides          Pec stretch   20"x3 doorway 20"x3 doorway          Towel snags   10x 5" ea 10 x :05 ea          Seated T/s ext   4 x :10                                                 Ther Activity                                       Gait Training                                       Modalities

## 2021-03-02 ENCOUNTER — OFFICE VISIT (OUTPATIENT)
Dept: PHYSICAL THERAPY | Facility: REHABILITATION | Age: 61
End: 2021-03-02
Payer: COMMERCIAL

## 2021-03-02 DIAGNOSIS — R51.9 WORSENING HEADACHES: Primary | ICD-10-CM

## 2021-03-02 DIAGNOSIS — R42 DIZZY SPELLS: ICD-10-CM

## 2021-03-02 PROCEDURE — 97110 THERAPEUTIC EXERCISES: CPT | Performed by: PHYSICAL THERAPIST

## 2021-03-02 PROCEDURE — 97140 MANUAL THERAPY 1/> REGIONS: CPT | Performed by: PHYSICAL THERAPIST

## 2021-03-02 PROCEDURE — 97112 NEUROMUSCULAR REEDUCATION: CPT | Performed by: PHYSICAL THERAPIST

## 2021-03-02 NOTE — PROGRESS NOTES
Daily Note     Today's date: 3/2/2021  Patient name: Sisi Chavira  : 1960  MRN: [id number]  Referring provider: Jennifer Monge MD  Dx:   Encounter Diagnosis     ICD-10-CM    1  Worsening headaches  R51 9    2  Dizzy spells  R42                         Subjective: Reports neck pain has been about the same  Has had a HA for the past few days, not sure why  Objective: See treatment diary below      Assessment: Tolerated treatment well  Needed some cues and further instruction in performing HEP properly  Fair response to manual interventions with reduced tightness reported  Performs exericses with no increase in pain  She would benefit from continued PT      Plan: Continue per plan of care        Precautions: depression    HEP: upper trap stretch, chin tucks, pec stretch, scapular squeeze,     Manuals 2/15 2/23 2/24/21 3/2         SoR  6' 6  PM 6'          IASTM   6' STM   PM IASTM 6'         Mobs              Manual traction   2' 2'  PM 2'          Neuro Re-Ed             Scap squeeze  10x 5" 10 x 5"          Rows   2x10 2 x :10 OTB  2x10 RTB         Low rows   2x10 2 x :10 OTB 2x10 RTB         Horizontal add   1 x 10 OTB education x10 OTB                                                Ther Ex             Chin tucks  Seated 10x 5" Seated 10x 5"     Supine 10x 5" seated 15x5"         Upper trap stretch 20"x3 ea 20"x3 for R side 20"x 4 for B side 20"x3 for R side         Rotation   4 x :10 Bsides With chin tuck 5x ea         Pec stretch   20"x3 doorway 20"x3 doorway 20"x3 doorway         Towel snags   10x 5" ea 10 x :05 ea 10 x :05 ea         Seated T/s ext   4 x :10                                                 Ther Activity                                       Gait Training                                       Modalities

## 2021-03-04 ENCOUNTER — OFFICE VISIT (OUTPATIENT)
Dept: PHYSICAL THERAPY | Facility: REHABILITATION | Age: 61
End: 2021-03-04
Payer: COMMERCIAL

## 2021-03-04 DIAGNOSIS — R51.9 WORSENING HEADACHES: Primary | ICD-10-CM

## 2021-03-04 DIAGNOSIS — R42 DIZZY SPELLS: ICD-10-CM

## 2021-03-04 PROCEDURE — 97140 MANUAL THERAPY 1/> REGIONS: CPT | Performed by: PHYSICAL THERAPIST

## 2021-03-04 PROCEDURE — 97110 THERAPEUTIC EXERCISES: CPT | Performed by: PHYSICAL THERAPIST

## 2021-03-04 PROCEDURE — 97112 NEUROMUSCULAR REEDUCATION: CPT | Performed by: PHYSICAL THERAPIST

## 2021-03-04 NOTE — PROGRESS NOTES
Daily Note     Today's date: 3/4/2021  Patient name: Alexandria Rojo  : 1960  MRN: [id number]  Referring provider: Julia De MD  Dx:   Encounter Diagnosis     ICD-10-CM    1  Worsening headaches  R51 9    2  Dizzy spells  R42                         Subjective: Reports having a stressfull day which is giving her more head and neck pain  Objective: See treatment diary below      Assessment: Tolerated treatment well  Reported musculare fatigue with postural strengthening progressions today  Cues needed for proper technique  No pain increase through out visit  She would benefit from continued PT      Plan: Continue per plan of care        Precautions: depression    HEP: upper trap stretch, chin tucks, pec stretch, scapular squeeze,     Manuals 2/15 2/23 2/24/21 3/2 3        SoR  6' 6  PM 6'  6'         IASTM   6' STM   PM IASTM 6' IASTM 6'        Mobs              Manual traction   2' 2'  PM 2'  4'         Neuro Re-Ed             Scap squeeze  10x 5" 10 x 5"          Rows   2x10 2 x :10 OTB  2x10 RTB 2x10 GTB        Low rows   2x10 2 x :10 OTB 2x10 RTB 2x10 GTB        Horizontal add   1 x 10 OTB education x10 OTB x10 OTB                                               Ther Ex             Chin tucks  Seated 10x 5" Seated 10x 5"     Supine 10x 5" seated 15x5" seated 15x5"        Upper trap stretch 20"x3 ea 20"x3 for R side 20"x 4 for B side 20"x3 for R side 20"x3 for R side        Rotation   4 x :10 Bsides With chin tuck 5x ea         Pec stretch   20"x3 doorway 20"x3 doorway 20"x3 doorway         Towel snags   10x 5" ea 10 x :05 ea 10 x :05 ea         Seated T/s ext   4 x :10          Chin tuck against wall      With Y's 10x                                   Ther Activity                                       Gait Training                                       Modalities

## 2021-03-09 ENCOUNTER — OFFICE VISIT (OUTPATIENT)
Dept: PHYSICAL THERAPY | Facility: REHABILITATION | Age: 61
End: 2021-03-09
Payer: COMMERCIAL

## 2021-03-09 DIAGNOSIS — R42 DIZZY SPELLS: ICD-10-CM

## 2021-03-09 DIAGNOSIS — R51.9 WORSENING HEADACHES: Primary | ICD-10-CM

## 2021-03-09 PROCEDURE — 97110 THERAPEUTIC EXERCISES: CPT | Performed by: PHYSICAL THERAPIST

## 2021-03-09 PROCEDURE — 97140 MANUAL THERAPY 1/> REGIONS: CPT | Performed by: PHYSICAL THERAPIST

## 2021-03-09 PROCEDURE — 97112 NEUROMUSCULAR REEDUCATION: CPT | Performed by: PHYSICAL THERAPIST

## 2021-03-09 NOTE — PROGRESS NOTES
Daily Note     Today's date: 3/9/2021  Patient name: Kathryn Bunn  : 1960  MRN: [id number]  Referring provider: Grace Leiva MD  Dx:   Encounter Diagnosis     ICD-10-CM    1  Worsening headaches  R51 9    2  Dizzy spells  R42                         Subjective: Reports feeling some improvement in her neck pian since last visit, but headaches haven't changed as much  Objective: See treatment diary below      Assessment: Tolerated treatment well  Less fatigue reported with postural strengthening today, able to tolerate greater volume  Demonstrated fair technique through  Reported some reduction in pain today by end of visit  She would benefit from continued PT      Plan: Continue per plan of care        Precautions: depression    HEP: upper trap stretch, chin tucks, pec stretch, scapular squeeze,     Manuals 2/15 2/23 2/24/21 3/2 3/4 3/9       SoR  6' 6  PM 6'  6'  6'       IASTM   6' STM   PM IASTM 6' IASTM 6' IASTM 6'       Mobs              Manual traction   2' 2'  PM 2'  4'  2'       Neuro Re-Ed             Scap squeeze  10x 5" 10 x 5"          Rows   2x10 2 x :10 OTB  2x10 RTB 2x10 GTB 2x10 GTB       Low rows   2x10 2 x :10 OTB 2x10 RTB 2x10 GTB 2x10 GTB       Horizontal add   1 x 10 OTB education x10 OTB x10 OTB 2x10 OTB                                              Ther Ex             Chin tucks  Seated 10x 5" Seated 10x 5"     Supine 10x 5" seated 15x5" seated 15x5" seated 15x5"       Upper trap stretch 20"x3 ea 20"x3 for R side 20"x 4 for B side 20"x3 for R side 20"x3 for R side 20"x3 for R side       Rotation   4 x :10 Bsides With chin tuck 5x ea         Pec stretch   20"x3 doorway 20"x3 doorway 20"x3 doorway         Towel snags   10x 5" ea 10 x :05 ea 10 x :05 ea  10 x :05 ea       Seated T/s ext   4 x :10          Chin tuck against wall      With Y's 10x  With Y's 10x2        Supine on towel roll      1' with pec stretch                    Ther Activity Gait Training                                       Modalities

## 2021-03-11 ENCOUNTER — OFFICE VISIT (OUTPATIENT)
Dept: PHYSICAL THERAPY | Facility: REHABILITATION | Age: 61
End: 2021-03-11
Payer: COMMERCIAL

## 2021-03-11 DIAGNOSIS — R42 DIZZY SPELLS: ICD-10-CM

## 2021-03-11 DIAGNOSIS — R51.9 WORSENING HEADACHES: Primary | ICD-10-CM

## 2021-03-11 PROCEDURE — 97112 NEUROMUSCULAR REEDUCATION: CPT | Performed by: PHYSICAL THERAPIST

## 2021-03-11 PROCEDURE — 97140 MANUAL THERAPY 1/> REGIONS: CPT | Performed by: PHYSICAL THERAPIST

## 2021-03-11 NOTE — PROGRESS NOTES
Daily Note     Today's date: 3/11/2021  Patient name: Alexandria Rojo  : 1960  MRN: [id number]  Referring provider: Julia De MD  Dx:   Encounter Diagnosis     ICD-10-CM    1  Worsening headaches  R51 9    2  Dizzy spells  R42                     Pt treated 1 on 1 from 520p to 550p    Subjective: Reports feel about the same as previous visit  Does HEP most days  Objective: See treatment diary below      Assessment: Tolerated treatment well  Given progressions today in exercise intensity which she tolerated well  Able to maintain deep neck flexion with head lifted against gravity, but reported fatigue  Cues needed to perform exercises properly  She would benefit from continued PT      Plan: Continue per plan of care        Precautions: depression    HEP: upper trap stretch, chin tucks, pec stretch, scapular squeeze,     Manuals 2/15 2/23 2/24/21 3/2 3/4 3/9 3/11      SoR  6' 6  PM 6'  6'  6' 5'      IASTM   6' STM   PM IASTM 6' IASTM 6' IASTM 6' IASTM 6'      Mobs              Manual traction   2' 2'  PM 2'  4'  2' 2'      Neuro Re-Ed             Scap squeeze  10x 5" 10 x 5"          Rows   2x10 2 x :10 OTB  2x10 RTB 2x10 GTB 2x10 GTB 2x10 GTB      Low rows   2x10 2 x :10 OTB 2x10 RTB 2x10 GTB 2x10 GTB 2x10 GTB      Horizontal add   1 x 10 OTB education x10 OTB x10 OTB 2x10 OTB       DNF with lift        10x 5"      W's        2x10 OTB                    Ther Ex             Chin tucks  Seated 10x 5" Seated 10x 5"     Supine 10x 5" seated 15x5" seated 15x5" seated 15x5" seated 15x5"      Upper trap stretch 20"x3 ea 20"x3 for R side 20"x 4 for B side 20"x3 for R side 20"x3 for R side 20"x3 for R side 20"x3 for R side      Rotation   4 x :10 Bsides With chin tuck 5x ea   With chin tuck 10x ea      Pec stretch   20"x3 doorway 20"x3 doorway 20"x3 doorway         Towel snags   10x 5" ea 10 x :05 ea 10 x :05 ea  10 x :05 ea       Seated T/s ext   4 x :10    10x 5"      Chin tuck against wall      With Y's 10x  With Y's 10x2  With Y's 2# 10x2       Supine on towel roll      1' with pec stretch                    Ther Activity                                       Gait Training                                       Modalities

## 2021-03-16 ENCOUNTER — OFFICE VISIT (OUTPATIENT)
Dept: PHYSICAL THERAPY | Facility: REHABILITATION | Age: 61
End: 2021-03-16
Payer: COMMERCIAL

## 2021-03-16 DIAGNOSIS — R42 DIZZY SPELLS: ICD-10-CM

## 2021-03-16 DIAGNOSIS — R51.9 WORSENING HEADACHES: Primary | ICD-10-CM

## 2021-03-16 PROCEDURE — 97112 NEUROMUSCULAR REEDUCATION: CPT | Performed by: PHYSICAL THERAPIST

## 2021-03-16 PROCEDURE — 97110 THERAPEUTIC EXERCISES: CPT | Performed by: PHYSICAL THERAPIST

## 2021-03-16 NOTE — PROGRESS NOTES
Daily Note     Today's date: 3/17/2021  Patient name: Josafat Brewster  : 1960  MRN: [id number]  Referring provider: Ammon Kelly MD  Dx:   Encounter Diagnosis     ICD-10-CM    1  Worsening headaches  R51 9    2  Dizzy spells  R42                     Pt treated 1 on 1 from 425p to 500p    Subjective: Reports that she has some pain with the exercises, but besides that she has been noticing a lot of improvement in her pain  Objective: See treatment diary below      Assessment: Tolerated treatment well  Focused more on building postural strength which she tolerated well today  She was fatigued by the end of visit and needed occasiona cues to perform exercises properly  She would benefit from continued PT      Plan: Continue per plan of care        Precautions: depression    HEP: upper trap stretch, chin tucks, pec stretch, scapular squeeze,     Manuals 2/15 2/23 2/24/21 3/2 3/4 3/9 3/11 3/16     SoR  6' 6  PM 6'  6'  6' 5' 6'     IASTM   6' STM   PM IASTM 6' IASTM 6' IASTM 6' IASTM 6'      Mobs              Manual traction   2' 2'  PM 2'  4'  2' 2' 2'     Neuro Re-Ed             Scap squeeze  10x 5" 10 x 5"          Rows   2x10 2 x :10 OTB  2x10 RTB 2x10 GTB 2x10 GTB 2x10 GTB 2x10 GTB     Low rows   2x10 2 x :10 OTB 2x10 RTB 2x10 GTB 2x10 GTB 2x10 GTB 2x10 GTB     Horizontal add   1 x 10 OTB education x10 OTB x10 OTB 2x10 OTB  x10 OTB     DNF with lift        10x 5" 10x 5"     W's        2x10 OTB  2x10 OTB                  Ther Ex             Chin tucks  Seated 10x 5" Seated 10x 5"     Supine 10x 5" seated 15x5" seated 15x5" seated 15x5" seated 15x5" seated 15x5"     Upper trap stretch 20"x3 ea 20"x3 for R side 20"x 4 for B side 20"x3 for R side 20"x3 for R side 20"x3 for R side 20"x3 for R side 20"x3 for R side     Rotation   4 x :10 Bsides With chin tuck 5x ea   With chin tuck 10x ea With chin tuck 10x ea     Pec stretch   20"x3 doorway 20"x3 doorway 20"x3 doorway         Towel snags   10x 5" ea 10 x :05 ea 10 x :05 ea  10 x :05 ea       Seated T/s ext   4 x :10    10x 5" 10x 5"     Chin tuck against wall      With Y's 10x  With Y's 10x2  With Y's 2# 10x2  With Y's 2# 10x2     Supine on towel roll      1' with pec stretch                    Ther Activity                                       Gait Training                                       Modalities

## 2021-03-18 ENCOUNTER — APPOINTMENT (OUTPATIENT)
Dept: PHYSICAL THERAPY | Facility: REHABILITATION | Age: 61
End: 2021-03-18
Payer: COMMERCIAL

## 2021-03-23 ENCOUNTER — EVALUATION (OUTPATIENT)
Dept: PHYSICAL THERAPY | Facility: REHABILITATION | Age: 61
End: 2021-03-23
Payer: COMMERCIAL

## 2021-03-23 DIAGNOSIS — R42 DIZZY SPELLS: ICD-10-CM

## 2021-03-23 DIAGNOSIS — R51.9 WORSENING HEADACHES: Primary | ICD-10-CM

## 2021-03-23 PROCEDURE — 97110 THERAPEUTIC EXERCISES: CPT | Performed by: PHYSICAL THERAPIST

## 2021-03-23 PROCEDURE — 97112 NEUROMUSCULAR REEDUCATION: CPT | Performed by: PHYSICAL THERAPIST

## 2021-03-23 NOTE — PROGRESS NOTES
Progress Update    Today's date: 3/23/2021  Patient name: Simeon Mercedes  : 1960  MRN: [id number]  Referring provider: Shalom Tyson MD  Dx:   Encounter Diagnosis     ICD-10-CM    1  Worsening headaches  R51 9    2  Dizzy spells  R42                       Subjective: Reports 4/10 pain at worst compared pain 9/10 on eval day at worst  Pt reports about 1 head ache a day, but feels that is decreasing this week  Feels about 75% improvement with tolerating her work duties  Feels she is still limited with getting through her work day and performing an exercise program on her own  Does her HEP about 3-4x a weeks  Also reports some issues with chronic R shoulder pain which she say makes it hard to hold her arm up for extended periods of time to brush her teeth or write on a black board  Objective: See treatment diary below    Cervical Range of Motion       Flexion 60 pain    60 pain     Extension 40    40         Left Right   Lateral Flexion 20 pain    20 18 pain    20 pain    Rotation 65    65 58 pain    60 pain        R shoulder function internal rotation behind back: 10 5cm difference from L side    R shoulder flex: 4/5  R shoulder abd: 4/5  R shoulder ER: 4/5    L shoulder flex: 5/5  L shoulder abd: 5/5  L shoulder ER: 4/5    Scapular Rhythm: WNL     Harrington-Xu test: pos on R side      Goals  STGs (4 weeks)  Pt will be independent with comprehensive HEP   Pt will demonstrate improved cervical AROM rotation to R by 10 degrees - progressing   Pt will report reduced average headache pain by at least 3 points  - MET    LTG's (to be achieved by d/c)  Pt will be able to self manage sx's independently - progressing  Pt will show pain free cervical AROM - progressing  Pt will report no more than 1 head ache per week - progressing  Pt will report no difficulty with performing her work duties at computer - progressing    Assessment: Since starting PT Tenny Balls has demonstrated steady improvement   No reporting considerably reduced pain levels based on NPRS  Has made small improvements in cervical AROM, with still remaining deficits with R rotation and pain at end range  She would benefit from continued skilled PT to further address these deficits and continue to reduce functional limitations with work duties  She expresses today that she has had chronic R shoulder pain, which did show weakness on MMT and signs consistent with impingement syndrome  These deficits will be address as well as this is likely contributing to L sided neck pain due to compensation  Plan: Continue per plan of care    2-4 more weeks at 2x per week     Precautions: depression    HEP: upper trap stretch, chin tucks, pec stretch, scapular squeeze,     Manuals 2/15 2/23 2/24/21 3/2 3/4 3/9 3/11 3/16 3/23    SoR  6' 6  PM 6'  6'  6' 5' 6'     IASTM   6' STM   PM IASTM 6' IASTM 6' IASTM 6' IASTM 6'      Mobs              Manual traction   2' 2'  PM 2'  4'  2' 2' 2'     Neuro Re-Ed             Scap squeeze  10x 5" 10 x 5"          Rows   2x10 2 x :10 OTB  2x10 RTB 2x10 GTB 2x10 GTB 2x10 GTB 2x10 GTB BTB 2x10    Low rows   2x10 2 x :10 OTB 2x10 RTB 2x10 GTB 2x10 GTB 2x10 GTB 2x10 GTB BTB 2x10    Horizontal add   1 x 10 OTB education x10 OTB x10 OTB 2x10 OTB  x10 OTB     DNF with lift        10x 5" 10x 5" 10x 5"    W's        2x10 OTB  2x10 OTB                  Ther Ex             Chin tucks  Seated 10x 5" Seated 10x 5"     Supine 10x 5" seated 15x5" seated 15x5" seated 15x5" seated 15x5" seated 15x5"     Upper trap stretch 20"x3 ea 20"x3 for R side 20"x 4 for B side 20"x3 for R side 20"x3 for R side 20"x3 for R side 20"x3 for R side 20"x3 for R side     Rotation   4 x :10 Bsides With chin tuck 5x ea   With chin tuck 10x ea With chin tuck 10x ea     Pec stretch   20"x3 doorway 20"x3 doorway 20"x3 doorway         Towel snags   10x 5" ea 10 x :05 ea 10 x :05 ea  10 x :05 ea       Seated T/s ext   4 x :10    10x 5" 10x 5"     Chin tuck against wall With Y's 10x  With Y's 10x2  With Y's 2# 10x2  With Y's 2# 10x2     Supine on towel roll      1' with pec stretch    nv   Arm bike           nv   Prone I, T, Y         I and T 2# 2x10 ea    Ther Activity                                       Gait Training                                       Modalities

## 2021-03-25 ENCOUNTER — OFFICE VISIT (OUTPATIENT)
Dept: PHYSICAL THERAPY | Facility: REHABILITATION | Age: 61
End: 2021-03-25
Payer: COMMERCIAL

## 2021-03-25 DIAGNOSIS — R51.9 WORSENING HEADACHES: Primary | ICD-10-CM

## 2021-03-25 DIAGNOSIS — R42 DIZZY SPELLS: ICD-10-CM

## 2021-03-25 PROCEDURE — 97140 MANUAL THERAPY 1/> REGIONS: CPT | Performed by: PHYSICAL THERAPIST

## 2021-03-25 PROCEDURE — 97110 THERAPEUTIC EXERCISES: CPT | Performed by: PHYSICAL THERAPIST

## 2021-03-25 PROCEDURE — 97112 NEUROMUSCULAR REEDUCATION: CPT | Performed by: PHYSICAL THERAPIST

## 2021-03-25 NOTE — PROGRESS NOTES
Daily Note    Today's date: 3/25/2021  Patient name: Josafat Brewster  : 1960  MRN: [id number]  Referring provider: Ammon Kelly MD  Dx:   Encounter Diagnosis     ICD-10-CM    1  Worsening headaches  R51 9    2  Dizzy spells  R42                       Subjective: Reports feeling a little tense from her drive over  Late for appointment today due to traffic  Objective: See treatment diary below      Assessment: Tolerated treatment well  Was fatigued with addition of rotator cuff strengthening today  Cues needed to perform exercises properly  Would benefit from continued PT  Plan: Continue per plan of care         Precautions: depression    HEP: upper trap stretch, chin tucks, pec stretch, scapular squeeze,     Manuals 2/15 2/23 2/24/21 3/2 3/4 3/9 3/11 3/16 3/23 3/25   SoR  6' 6  PM 6'  6'  6' 5' 6'  6'   IASTM   6' STM   PM IASTM 6' IASTM 6' IASTM 6' IASTM 6'      Mobs              Manual traction   2' 2'  PM 2'  4'  2' 2' 2'  2'   Neuro Re-Ed             Scap squeeze  10x 5" 10 x 5"          Rows   2x10 2 x :10 OTB  2x10 RTB 2x10 GTB 2x10 GTB 2x10 GTB 2x10 GTB BTB 2x10 BTB 2x10   Low rows   2x10 2 x :10 OTB 2x10 RTB 2x10 GTB 2x10 GTB 2x10 GTB 2x10 GTB BTB 2x10 BTB 2x10   Horizontal add   1 x 10 OTB education x10 OTB x10 OTB 2x10 OTB  x10 OTB     DNF with lift        10x 5" 10x 5" 10x 5"    W's        2x10 OTB  2x10 OTB                  Ther Ex             Chin tucks  Seated 10x 5" Seated 10x 5"     Supine 10x 5" seated 15x5" seated 15x5" seated 15x5" seated 15x5" seated 15x5"     Upper trap stretch 20"x3 ea 20"x3 for R side 20"x 4 for B side 20"x3 for R side 20"x3 for R side 20"x3 for R side 20"x3 for R side 20"x3 for R side     Rotation   4 x :10 Bsides With chin tuck 5x ea   With chin tuck 10x ea With chin tuck 10x ea     Pec stretch   20"x3 doorway 20"x3 doorway 20"x3 doorway         Towel snags   10x 5" ea 10 x :05 ea 10 x :05 ea  10 x :05 ea       Seated T/s ext   4 x :10    10x 5" 10x 5" Chin tuck against wall      With Y's 10x  With Y's 10x2  With Y's 2# 10x2  With Y's 2# 10x2     Supine on towel roll      1' with pec stretch    nv   Arm bike           2' retro   Shoulder ER          RTB 2x10   Walk walks with band          OTB 5x                Prone I, T, Y         I and T 2# 2x10 ea nv    Ther Activity                                       Gait Training                                       Modalities

## 2021-03-30 ENCOUNTER — OFFICE VISIT (OUTPATIENT)
Dept: PHYSICAL THERAPY | Facility: REHABILITATION | Age: 61
End: 2021-03-30
Payer: COMMERCIAL

## 2021-03-30 DIAGNOSIS — R42 DIZZY SPELLS: ICD-10-CM

## 2021-03-30 DIAGNOSIS — R51.9 WORSENING HEADACHES: Primary | ICD-10-CM

## 2021-03-30 PROCEDURE — 97112 NEUROMUSCULAR REEDUCATION: CPT | Performed by: PHYSICAL THERAPIST

## 2021-03-30 PROCEDURE — 97140 MANUAL THERAPY 1/> REGIONS: CPT | Performed by: PHYSICAL THERAPIST

## 2021-03-30 NOTE — PROGRESS NOTES
Daily Note    Today's date: 3/30/2021  Patient name: Jayson Romero  : 1960  MRN: [id number]  Referring provider: Hailee Garrison MD  Dx:   Encounter Diagnosis     ICD-10-CM    1  Worsening headaches  R51 9    2  Dizzy spells  R42                       Subjective: Reports feeling a little tense from her drive over  Late for appointment today due to traffic  Objective: See treatment diary below      Assessment: Tolerated treatment well  Demonstrated some lateral head tilt to R side when perforing exercises today  Had pt perform in front of mirror to self correct posture  Was fatigued particularly with rotator cuff strenthening  Would benefit from continued PT  Plan: Continue per plan of care  Precautions: depression    HEP: upper trap stretch, chin tucks, pec stretch, DNF, chin tuck with rotation, row, low rows       Manuals 3/30    3/4 3/9 3/11 3/16 3/23 3/25   SoR 5'    6'  6' 5' 6'  6'   IASTM  5'    IASTM 6' IASTM 6' IASTM 6'      Mobs              Manual traction  2'    4'  2' 2' 2'  2'   Neuro Re-Ed             Scap squeeze             Rows      2x10 GTB 2x10 GTB 2x10 GTB 2x10 GTB BTB 2x10 BTB 2x10   Low rows      2x10 GTB 2x10 GTB 2x10 GTB 2x10 GTB BTB 2x10 BTB 2x10   Horizontal add     x10 OTB 2x10 OTB  x10 OTB     DNF with lift  10x 5"      10x 5" 10x 5" 10x 5"    W's        2x10 OTB  2x10 OTB                  Ther Ex             Chin tucks      seated 15x5" seated 15x5" seated 15x5" seated 15x5"     Upper trap stretch     20"x3 for R side 20"x3 for R side 20"x3 for R side 20"x3 for R side     Rotation       With chin tuck 10x ea With chin tuck 10x ea     Pec stretch              Towel snags       10 x :05 ea       Seated T/s ext       10x 5" 10x 5"     Chin tuck against wall  With b/l scaption 2# 2x10 (mirror for posture)    With Y's 10x  With Y's 10x2  With Y's 2# 10x2  With Y's 2# 10x2     Supine on towel roll 30"x3 with pec strech      1' with pec stretch    nv   Arm bike  2' retro 2' retro   Shoulder ER RTB 2x10         RTB 2x10   Walk walks with band OTB 6x         OTB 5x   Shoulder shrugs                 Prone I, T, Y I and T 2# 2x10 ea         I and T 2# 2x10 ea nv    Ther Activity                                       Gait Training                                       Modalities

## 2021-04-01 ENCOUNTER — OFFICE VISIT (OUTPATIENT)
Dept: PHYSICAL THERAPY | Facility: REHABILITATION | Age: 61
End: 2021-04-01
Payer: COMMERCIAL

## 2021-04-01 DIAGNOSIS — R51.9 WORSENING HEADACHES: Primary | ICD-10-CM

## 2021-04-01 DIAGNOSIS — R42 DIZZY SPELLS: ICD-10-CM

## 2021-04-01 PROCEDURE — 97140 MANUAL THERAPY 1/> REGIONS: CPT | Performed by: PHYSICAL THERAPIST

## 2021-04-01 PROCEDURE — 97110 THERAPEUTIC EXERCISES: CPT | Performed by: PHYSICAL THERAPIST

## 2021-04-01 NOTE — PROGRESS NOTES
Daily Note    Today's date: 2021  Patient name: Irais Pascal  : 1960  MRN: [id number]  Referring provider: Radha Ellsworth MD  Dx:   Encounter Diagnosis     ICD-10-CM    1  Worsening headaches  R51 9    2  Dizzy spells  R42                       Subjective: Reports being a bit more sore on R side of her neck today than last time  Objective: See treatment diary below      Assessment: Tolerated treatment well  Performed mobilizations today which pt tolerated fair and demonstrated some restricted intervertebral mobility through out cervical and thoracic spine  Was fatigued by end of visit  Would benefit from continued PT  Plan: Continue per plan of care  Precautions: depression    HEP: upper trap stretch, chin tucks, pec stretch, DNF, chin tuck with rotation, row, low rows       Manuals 3/30 4/1   3/4 3/9 3/11 3/16 3/23 3/25   SoR 5' 5'   6'  6' 5' 6'  6'   IASTM  5' 2'   IASTM 6' IASTM 6' IASTM 6'      Mobs   cervical, thoracic 8'           Manual traction  2' 2'   4'  2' 2' 2'  2'   Neuro Re-Ed             Scap squeeze             Rows      2x10 GTB 2x10 GTB 2x10 GTB 2x10 GTB BTB 2x10 BTB 2x10   Low rows      2x10 GTB 2x10 GTB 2x10 GTB 2x10 GTB BTB 2x10 BTB 2x10   Horizontal add     x10 OTB 2x10 OTB  x10 OTB     DNF with lift  10x 5"      10x 5" 10x 5" 10x 5"    W's        2x10 OTB  2x10 OTB                  Ther Ex             Chin tucks   seated 15x5"    Prone with plank 10x 5"   seated 15x5" seated 15x5" seated 15x5" seated 15x5"     Upper trap stretch  20"x3 ea   20"x3 for R side 20"x3 for R side 20"x3 for R side 20"x3 for R side     Rotation       With chin tuck 10x ea With chin tuck 10x ea     Pec stretch              Towel snags       10 x :05 ea       Seated T/s ext       10x 5" 10x 5"     Chin tuck against wall  With b/l scaption 2# 2x10 (mirror for posture)    With Y's 10x  With Y's 10x2  With Y's 2# 10x2  With Y's 2# 10x2     Supine on towel roll 30"x3 with pec strech      1' with pec stretch    nv   Arm bike  2' retro          2' retro   Shoulder ER RTB 2x10         RTB 2x10   Walk walks with band OTB 6x         OTB 5x   Shoulder shrugs                 Prone I, T, Y I and T 2# 2x10 ea  On pball x10 2# ea       I and T 2# 2x10 ea nv    Ther Activity                                       Gait Training                                       Modalities

## 2021-04-06 ENCOUNTER — OFFICE VISIT (OUTPATIENT)
Dept: PHYSICAL THERAPY | Facility: REHABILITATION | Age: 61
End: 2021-04-06
Payer: COMMERCIAL

## 2021-04-06 DIAGNOSIS — R51.9 WORSENING HEADACHES: Primary | ICD-10-CM

## 2021-04-06 DIAGNOSIS — R42 DIZZY SPELLS: ICD-10-CM

## 2021-04-06 PROCEDURE — 97140 MANUAL THERAPY 1/> REGIONS: CPT | Performed by: PHYSICAL THERAPIST

## 2021-04-06 PROCEDURE — 97112 NEUROMUSCULAR REEDUCATION: CPT | Performed by: PHYSICAL THERAPIST

## 2021-04-06 PROCEDURE — 97110 THERAPEUTIC EXERCISES: CPT | Performed by: PHYSICAL THERAPIST

## 2021-04-06 NOTE — PROGRESS NOTES
Daily Note    Today's date: 2021  Patient name: Wayne Whitt  : 1960  MRN: [id number]  Referring provider: Avery Kaba MD  Dx:   Encounter Diagnosis     ICD-10-CM    1  Worsening headaches  R51 9    2  Dizzy spells  R42                       Subjective: Reports feeling ok today  States her R shoulder was sore this morning  Objective: See treatment diary below      Assessment: Tolerated treatment well  Better tolerance to mobilizations today reporting less discomfort, focused on thoracic spine today  Report fatigue and some pain increase in R shoulder with exercises today, but able to tolerate  Would benefit from continued PT  Plan: Continue per plan of care  Precautions: depression    HEP: upper trap stretch, chin tucks, pec stretch, DNF, chin tuck with rotation, row, low rows       Manuals 3/30 4/1 4/6  3/4 3/9 3/11 3/16 3/23 3/25   SoR 5' 5' 5'  6'  6' 5' 6'  6'   IASTM  5' 2'   IASTM 6' IASTM 6' IASTM 6'      Mobs   cervical, thoracic 8' Thoracic 5' Gr II          Manual traction  2' 2'   4'  2' 2' 2'  2'   Neuro Re-Ed             Scap squeeze             Rows      2x10 GTB 2x10 GTB 2x10 GTB 2x10 GTB BTB 2x10 BTB 2x10   Low rows      2x10 GTB 2x10 GTB 2x10 GTB 2x10 GTB BTB 2x10 BTB 2x10   Horizontal add     x10 OTB 2x10 OTB  x10 OTB     DNF with lift  10x 5"  10x 5"    10x 5" 10x 5" 10x 5"    W's        2x10 OTB  2x10 OTB                  Ther Ex             Chin tucks   seated 15x5"    Prone with plank 10x 5" Prone with plank on knees, 5x 10"  seated 15x5" seated 15x5" seated 15x5" seated 15x5"     Upper trap stretch  20"x3 ea   20"x3 for R side 20"x3 for R side 20"x3 for R side 20"x3 for R side     Rotation       With chin tuck 10x ea With chin tuck 10x ea     Pec stretch              Towel snags       10 x :05 ea       Seated T/s ext       10x 5" 10x 5"     Chin tuck against wall  With b/l scaption 2# 2x10 (mirror for posture)  With b/l scaption 2# 2x10  With Y's 10x  With Y's 10x2  With Y's 2# 10x2  With Y's 2# 10x2     Supine on towel roll 30"x3 with pec strech    1'x2 with pec strech   1' with pec stretch    nv   Arm bike  2' retro   2' retro       2' retro   Shoulder ER RTB 2x10  RTB 2x10       RTB 2x10   Walk walks with band OTB 6x  OTB 7x       OTB 5x   Shoulder shrugs                 Prone I, T, Y I and T 2# 2x10 ea  On pball x10 2# ea nv      I and T 2# 2x10 ea nv    Ther Activity                                       Gait Training                                       Modalities

## 2021-04-08 ENCOUNTER — OFFICE VISIT (OUTPATIENT)
Dept: PHYSICAL THERAPY | Facility: REHABILITATION | Age: 61
End: 2021-04-08
Payer: COMMERCIAL

## 2021-04-08 DIAGNOSIS — R51.9 WORSENING HEADACHES: Primary | ICD-10-CM

## 2021-04-08 DIAGNOSIS — R42 DIZZY SPELLS: ICD-10-CM

## 2021-04-08 PROCEDURE — 97112 NEUROMUSCULAR REEDUCATION: CPT | Performed by: PHYSICAL THERAPIST

## 2021-04-08 PROCEDURE — 97140 MANUAL THERAPY 1/> REGIONS: CPT | Performed by: PHYSICAL THERAPIST

## 2021-04-08 PROCEDURE — 97110 THERAPEUTIC EXERCISES: CPT | Performed by: PHYSICAL THERAPIST

## 2021-04-08 NOTE — PROGRESS NOTES
Daily Note    Today's date: 2021  Patient name: Eva Bernard  : 1960  MRN: [id number]  Referring provider: Larry Maher MD  Dx:   Encounter Diagnosis     ICD-10-CM    1  Worsening headaches  R51 9    2  Dizzy spells  R42                       Subjective: Reports feeling ok today  States that after last visit she had some soreness in her upper back region which went away after a day  Has been doing some planks at home  Overall headaches are greatly improved  Objective: See treatment diary below      Assessment: Tolerated treatment well  Some discomfort with mobilizations, but tolerates well overall  Continues to have some limited intervertebral mobility due to pain  Able to tolerate small progressions with shoulder strengthening today  Would benefit from continued PT  Plan: Continue per plan of care  Precautions: depression    HEP: upper trap stretch, chin tucks, pec stretch, DNF, chin tuck with rotation, row, low rows       Manuals 3/30 4/1 4/6 4/8         SoR 5' 5' 5' 5'         IASTM  5' 2'           Mobs   cervical, thoracic 8' Thoracic 5' Gr II Thoracic 5' Gr II         Manual traction  2' 2'           Neuro Re-Ed             Scap squeeze             Rows              Low rows              Horizontal add             DNF with lift  10x 5"  10x 5"          W's                           Ther Ex             Chin tucks   seated 15x5"    Prone with plank 10x 5" Prone with plank on knees, 5x 10" Prone with plank on knees, 6x 10"         Upper trap stretch  20"x3 ea           Rotation             Pec stretch              Towel snags              Seated T/s ext    10x10"         Chin tuck against wall  With b/l scaption 2# 2x10 (mirror for posture)  With b/l scaption 2# 2x10          Supine on towel roll 30"x3 with pec strech    1'x2 with pec strech          Arm bike  2' retro   2' retro 2' retro           Shoulder ER RTB 2x10  RTB 2x10 GTB 2x10         Walk walks with band OTB 6x  OTB 7x OTB x8         Ball circles on wall              Shoulder shrugs                 Prone I, T, Y I and T 2# 2x10 ea  On pball x10 2# ea nv On pball x10 2# ea         Ther Activity                                       Gait Training                                       Modalities

## 2021-04-13 ENCOUNTER — OFFICE VISIT (OUTPATIENT)
Dept: PHYSICAL THERAPY | Facility: REHABILITATION | Age: 61
End: 2021-04-13
Payer: COMMERCIAL

## 2021-04-13 DIAGNOSIS — R51.9 WORSENING HEADACHES: Primary | ICD-10-CM

## 2021-04-13 DIAGNOSIS — R42 DIZZY SPELLS: ICD-10-CM

## 2021-04-13 PROCEDURE — 97112 NEUROMUSCULAR REEDUCATION: CPT | Performed by: PHYSICAL THERAPIST

## 2021-04-13 PROCEDURE — 97110 THERAPEUTIC EXERCISES: CPT | Performed by: PHYSICAL THERAPIST

## 2021-04-13 PROCEDURE — 97140 MANUAL THERAPY 1/> REGIONS: CPT | Performed by: PHYSICAL THERAPIST

## 2021-04-13 NOTE — PROGRESS NOTES
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  Objective: See treatment diary below      Assessment: Tolerated treatment well  Reported reduction of neck and head pain with manual interventions and exercises today  Was more fatigued today by end of visit  Cues needed to perform exercises properly  Would benefit from continued PT  Plan: Continue per plan of care  Precautions: depression    HEP: upper trap stretch, chin tucks, pec stretch, DNF, chin tuck with rotation, row, low rows       Manuals 3/30 4/1 4/6 4/8 4/13        SoR 5' 5' 5' 5' 5'        IASTM  5' 2'           Mobs   cervical, thoracic 8' Thoracic 5' Gr II Thoracic 5' Gr II Thoracic 5' Gr II        Manual traction  2' 2'           Neuro Re-Ed             Scap squeeze             Rows              Low rows              Horizontal add             DNF with lift  10x 5"  10x 5"  10x 5"        W's                           Ther Ex             Chin tucks   seated 15x5"    Prone with plank 10x 5" Prone with plank on knees, 5x 10" Prone with plank on knees, 6x 10" Prone with plank on knees, 6x 10"        Upper trap stretch  20"x3 ea   20"x3 ea        Rotation             Pec stretch              Towel snags              Seated T/s ext    10x10" 10x10"        Chin tuck against wall  With b/l scaption 2# 2x10 (mirror for posture)  With b/l scaption 2# 2x10  With b/l scaption 2# 2x10        Supine on towel roll 30"x3 with pec strech    1'x2 with pec strech          Arm bike  2' retro   2' retro 2' retro           Shoulder ER RTB 2x10  RTB 2x10 GTB 2x10 GTB 2x10        Walk walks with band OTB 6x  OTB 7x OTB x8 OTB        Ball circles on wall              Shoulder shrugs                 Prone I, T, Y I and T 2# 2x10 ea  On pball x10 2# ea nv On pball x10 2# ea         Ther Activity                                       Gait Training                                       Modalities

## 2021-04-15 ENCOUNTER — OFFICE VISIT (OUTPATIENT)
Dept: PHYSICAL THERAPY | Facility: REHABILITATION | Age: 61
End: 2021-04-15
Payer: COMMERCIAL

## 2021-04-15 DIAGNOSIS — R42 DIZZY SPELLS: ICD-10-CM

## 2021-04-15 DIAGNOSIS — R51.9 WORSENING HEADACHES: Primary | ICD-10-CM

## 2021-04-15 DIAGNOSIS — E03.9 ACQUIRED HYPOTHYROIDISM: ICD-10-CM

## 2021-04-15 PROCEDURE — 97112 NEUROMUSCULAR REEDUCATION: CPT | Performed by: PHYSICAL THERAPIST

## 2021-04-15 PROCEDURE — 97110 THERAPEUTIC EXERCISES: CPT | Performed by: PHYSICAL THERAPIST

## 2021-04-15 PROCEDURE — 97140 MANUAL THERAPY 1/> REGIONS: CPT | Performed by: PHYSICAL THERAPIST

## 2021-04-15 NOTE — PROGRESS NOTES
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    Objective: See treatment diary below      Assessment: Tolerated treatment well  Responded well to MET's today with reduced pain and improved ROM  Cues needed to perform exercises properly  Would benefit from continued PT  Plan: Continue per plan of care  Precautions: depression    HEP: upper trap stretch, chin tucks, pec stretch, DNF, chin tuck with rotation, row, low rows       Manuals 3/30 4/1 4/6 4/8 4/13 4/15       SoR 5' 5' 5' 5' 5' 5'       IASTM  5' 2'           Mobs   cervical, thoracic 8' Thoracic 5' Gr II Thoracic 5' Gr II Thoracic 5' Gr II Thoracic 5' Gr II       Manual traction  2' 2'           MET upper cerv rotation       5x 5" ea way       Neuro Re-Ed             Scap squeeze             Rows       Black 2x10       Low rows       BTB 2x10       Horizontal add             DNF with lift  10x 5"  10x 5"  10x 5" 10x 5"       W's                           Ther Ex             Chin tucks   seated 15x5"    Prone with plank 10x 5" Prone with plank on knees, 5x 10" Prone with plank on knees, 6x 10" Prone with plank on knees, 6x 10"        Upper trap stretch  20"x3 ea   20"x3 ea        Pec stretch              Towel snags       10x 5" ea       Seated T/s ext    10x10" 10x10" 10x10"       Chin tuck against wall  With b/l scaption 2# 2x10 (mirror for posture)  With b/l scaption 2# 2x10  With b/l scaption 2# 2x10        Supine on towel roll 30"x3 with pec strech    1'x2 with pec strech          Arm bike  2' retro   2' retro 2' retro    2' retro       Shoulder ER RTB 2x10  RTB 2x10 GTB 2x10 GTB 2x10 GTB 2x10 Walk walks with band OTB 6x  OTB 7x OTB x8 OTB        Ball circles on wall              Shoulder Rolls          3# DB 2x10       Prone I, T, Y I and T 2# 2x10 ea  On pball x10 2# ea nv On pball x10 2# ea         Wall angels       2x10       Ther Activity                                       Gait Training                                       Modalities

## 2021-04-16 RX ORDER — LEVOTHYROXINE SODIUM 0.03 MG/1
TABLET ORAL
Qty: 90 TABLET | Refills: 0 | Status: SHIPPED | OUTPATIENT
Start: 2021-04-16 | End: 2021-07-13

## 2021-04-20 ENCOUNTER — OFFICE VISIT (OUTPATIENT)
Dept: PHYSICAL THERAPY | Facility: REHABILITATION | Age: 61
End: 2021-04-20
Payer: COMMERCIAL

## 2021-04-20 DIAGNOSIS — R51.9 WORSENING HEADACHES: Primary | ICD-10-CM

## 2021-04-20 DIAGNOSIS — R42 DIZZY SPELLS: ICD-10-CM

## 2021-04-20 PROCEDURE — 97140 MANUAL THERAPY 1/> REGIONS: CPT | Performed by: PHYSICAL THERAPIST

## 2021-04-20 PROCEDURE — 97110 THERAPEUTIC EXERCISES: CPT | Performed by: PHYSICAL THERAPIST

## 2021-04-20 NOTE — PROGRESS NOTES
Daily Note    Today's date: 2021  Patient name: Davida Winters  : 1960  MRN: [id number]  Referring provider: Kayleen Gilmore MD  Dx:   Encounter Diagnosis     ICD-10-CM    1  Worsening headaches  R51 9    2  Dizzy spells  R42                   Pt treated 1 on 1 from 420p to 445p    Subjective: Reports feeling good regarding her neck pain  She reports that her headaches have been more bothersome the past 1-2 weeks though  Pain is primarily at the crown of the head symmetrically  She states she wakes up with a headache each day  Doing exercise for her neck does not affect the headaches  Objective: See treatment diary below      Assessment: Tolerated treatment well  She continues to perform very well with exercises with no increase in pain and fatigue reported following  Only occasional cues needed to perform exercises with proper technique  She reports continued compliance with HEP  Discussed with pt that at this time she is close to maximizing her benefits from skilled PT and will likely be appropriate for discharge soon, which she was in agreement with  Also discussed referral to headache specialist if this continues to be bothersome  Would benefit from continued PT with anticipated discharge NV  Plan: Continue per plan of care  Possible discharge NV  Precautions: depression    HEP: upper trap stretch, chin tucks, pec stretch, DNF, chin tuck with rotation, row, low rows       Manuals 3/30 4/1 4/6 4/8 4/13 4/15 4/20      SoR 5' 5' 5' 5' 5' 5' 5'      IASTM  5' 2'           Mobs   cervical, thoracic 8' Thoracic 5' Gr II Thoracic 5' Gr II Thoracic 5' Gr II Thoracic 5' Gr II Thoracic 5' Gr III      Manual traction  2' 2'           MET upper cerv rotation       5x 5" ea way 5x 5" ea way      Neuro Re-Ed             Scap squeeze             Rows       Black 2x10 Black 2x10      Low rows       BTB 2x10 BTB 2x10      Horizontal add             DNF with lift  10x 5"  10x 5"  10x 5" 10x 5"       W's Ther Ex             Chin tucks   seated 15x5"    Prone with plank 10x 5" Prone with plank on knees, 5x 10" Prone with plank on knees, 6x 10" Prone with plank on knees, 6x 10"  Prone with plank on knees, 6x 10"      Upper trap stretch  20"x3 ea   20"x3 ea        Pec stretch              Towel snags       10x 5" ea 10x 5" ea      Seated T/s ext    10x10" 10x10" 10x10" 10x10"      Chin tuck against wall  With b/l scaption 2# 2x10 (mirror for posture)  With b/l scaption 2# 2x10  With b/l scaption 2# 2x10        Supine on towel roll 30"x3 with pec strech    1'x2 with pec strech          Arm bike  2' retro   2' retro 2' retro    2' retro 2' retro      Shoulder ER RTB 2x10  RTB 2x10 GTB 2x10 GTB 2x10 GTB 2x10 GTB 2x10      Walk walks with band OTB 6x  OTB 7x OTB x8 OTB        Ball circles on wall              Shoulder Rolls          3# DB 2x10 3# DB 2x10      Prone I, T, Y I and T 2# 2x10 ea  On pball x10 2# ea nv On pball x10 2# ea         Wall angels       2x10 2x10      Ther Activity                                       Gait Training                                       Modalities

## 2021-04-26 ENCOUNTER — OFFICE VISIT (OUTPATIENT)
Dept: PHYSICAL THERAPY | Facility: REHABILITATION | Age: 61
End: 2021-04-26
Payer: COMMERCIAL

## 2021-04-26 DIAGNOSIS — R42 DIZZY SPELLS: ICD-10-CM

## 2021-04-26 DIAGNOSIS — R51.9 WORSENING HEADACHES: Primary | ICD-10-CM

## 2021-04-26 PROCEDURE — 97110 THERAPEUTIC EXERCISES: CPT | Performed by: PHYSICAL THERAPIST

## 2021-04-26 NOTE — PROGRESS NOTES
Discharge    Today's date: 2021  Patient name: Rosas Kimble  : 1960  MRN: [id number]  Referring provider: Rosy Mo MD  Dx:   Encounter Diagnosis     ICD-10-CM    1  Worsening headaches  R51 9    2  Dizzy spells  R42                   Pt treated 1 on  from 430p to 500p    Subjective: Reports pain at about 6/10 pain at worst  She feels her neck has improved greatly since starting PT and headaches have improved as well, but not fully resolved  She states her head and neck pain has worsened slightly since returning to work about 1 week ago which she attributes to her work set up and potentailly more stress  Feels her R shoulder pain is also improved with less pain and less fatigue  Has been consistent with HEP  Objective: See treatment diary below    Cervical Range of Motion       Flexion 60 pain     60 pain    60 pain     Extension 40     40    46       Left Right   Lateral Flexion 20 pain     20    20 18 pain     20 pain     20   Rotation 65     65    62 pain 58 pain     60 pain    64         R shoulder function internal rotation behind back: 10 5cm difference from L side     R shoulder flex: 5/5  R shoulder abd: 4/5 pain  R shoulder ER: 5/5     L shoulder flex: 5/5  L shoulder abd: 5/5  L shoulder ER: 5/5       Assessment: Pt has progressed well while in therapy  She has been consistently improving with neck and head pain, however did recently experience some increase in headache pain  Upon discussion with patient, explained that this is 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)  She is no longer demonstrating deficits in cervical ROM and has showed improved shoulder strength  She continues to have some residual shoulder pain, but is not limiting function at this time  She has become highly competent with HEP at this time, and has likely exhausted her potential benefits from PT for the time being   I discussed with her that if headaches are persistent and bothersome, she may want to consider referral from her PCP to a headache specialist  She is appropriate for discharge to HEP at this time and is no longer candidate for skilled PT       Plan: Discharge to HEP      Precautions: depression    HEP: upper trap stretch, chin tucks, pec stretch, DNF, chin tuck with rotation, row, low rows, chin tuck with plank     Manuals 3/30 4/1 4/6 4/8 4/13 4/15 4/20 4/26     SoR 5' 5' 5' 5' 5' 5' 5'      IASTM  5' 2'           Mobs   cervical, thoracic 8' Thoracic 5' Gr II Thoracic 5' Gr II Thoracic 5' Gr II Thoracic 5' Gr II Thoracic 5' Gr III      Manual traction  2' 2'           MET upper cerv rotation       5x 5" ea way 5x 5" ea way      Neuro Re-Ed             Scap squeeze             Rows       Black 2x10 Black 2x10 Black 2x10     Low rows       BTB 2x10 BTB 2x10      Horizontal add             DNF with lift  10x 5"  10x 5"  10x 5" 10x 5"       W's                           Ther Ex             Chin tucks   seated 15x5"    Prone with plank 10x 5" Prone with plank on knees, 5x 10" Prone with plank on knees, 6x 10" Prone with plank on knees, 6x 10"  Prone with plank on knees, 6x 10" reviewed      Upper trap stretch  20"x3 ea   20"x3 ea        Pec stretch              Towel snags       10x 5" ea 10x 5" ea reviewed      Seated T/s ext    10x10" 10x10" 10x10" 10x10" 10x10"     Chin tuck against wall  With b/l scaption 2# 2x10 (mirror for posture)  With b/l scaption 2# 2x10  With b/l scaption 2# 2x10        Supine on towel roll 30"x3 with pec strech    1'x2 with pec strech          Arm bike  2' retro   2' retro 2' retro    2' retro 2' retro 2'/2'     Shoulder ER RTB 2x10  RTB 2x10 GTB 2x10 GTB 2x10 GTB 2x10 GTB 2x10 reviewed      Walk walks with band OTB 6x  OTB 7x OTB x8 OTB        Ball circles on wall              Shoulder Rolls          3# DB 2x10 3# DB 2x10 3# DB 2x10     Prone I, T, Y I and T 2# 2x10 ea  On pball x10 2# ea nv On pball x10 2# ea         Wall angels       2x10 2x10 2x10     Ther Activity                                       Gait Training                                       Modalities

## 2021-05-04 ENCOUNTER — OFFICE VISIT (OUTPATIENT)
Dept: FAMILY MEDICINE CLINIC | Facility: CLINIC | Age: 61
End: 2021-05-04
Payer: COMMERCIAL

## 2021-05-04 VITALS
TEMPERATURE: 97.1 F | WEIGHT: 122 LBS | BODY MASS INDEX: 21.62 KG/M2 | HEIGHT: 63 IN | SYSTOLIC BLOOD PRESSURE: 134 MMHG | DIASTOLIC BLOOD PRESSURE: 84 MMHG | HEART RATE: 68 BPM

## 2021-05-04 DIAGNOSIS — E78.2 MIXED HYPERLIPIDEMIA: ICD-10-CM

## 2021-05-04 DIAGNOSIS — E03.9 ACQUIRED HYPOTHYROIDISM: ICD-10-CM

## 2021-05-04 DIAGNOSIS — A69.20 LYME DISEASE: Primary | ICD-10-CM

## 2021-05-04 DIAGNOSIS — R51.9 FREQUENT HEADACHES: ICD-10-CM

## 2021-05-04 DIAGNOSIS — F33.1 MODERATE EPISODE OF RECURRENT MAJOR DEPRESSIVE DISORDER (HCC): ICD-10-CM

## 2021-05-04 PROCEDURE — 3725F SCREEN DEPRESSION PERFORMED: CPT | Performed by: FAMILY MEDICINE

## 2021-05-04 PROCEDURE — 3008F BODY MASS INDEX DOCD: CPT | Performed by: FAMILY MEDICINE

## 2021-05-04 PROCEDURE — 1036F TOBACCO NON-USER: CPT | Performed by: FAMILY MEDICINE

## 2021-05-04 PROCEDURE — 99214 OFFICE O/P EST MOD 30 MIN: CPT | Performed by: FAMILY MEDICINE

## 2021-05-04 RX ORDER — ATORVASTATIN CALCIUM 10 MG/1
10 TABLET, FILM COATED ORAL DAILY
Qty: 30 TABLET | Refills: 5 | Status: SHIPPED | OUTPATIENT
Start: 2021-05-04 | End: 2022-01-17

## 2021-05-04 NOTE — ASSESSMENT & PLAN NOTE
Still has some issues with HA  She did try Imitrex, but it did not seem to work much for her when she had it  She is out of the prescription at this point, and again not sure that made any difference

## 2021-05-04 NOTE — PROGRESS NOTES
Assessment and Plan:    Problem List Items Addressed This Visit     Depression     Stable  No changes  Follow with next OV  Frequent headaches     Still has some issues with HA  She did try Imitrex, but it did not seem to work much for her when she had it  She is out of the prescription at this point, and again not sure that made any difference  Relevant Orders    Comprehensive metabolic panel    TSH, 3rd generation    Ambulatory referral to Neurology    Hyperlipidemia     Patient's cholesterol in February continued to be elevated  Based on that, recommend that she restart statins  Patient does have atorvastatin statin at home, therefore I would recommend that she start on atorvastatin, 10 mg 1 a day  Check blood work in approximately 3-6 months  Relevant Medications    atorvastatin (LIPITOR) 10 mg tablet    Other Relevant Orders    Comprehensive metabolic panel    Lipid panel    Hypothyroidism     On Synthroid  Check labs  Relevant Orders    TSH, 3rd generation    Lyme disease - Primary     Patient had positive Lyme testing on blood, but was treated with doxycycline for 21 days, suggesting that now there is no longer Lyme disease present  As far as testing for recurrence, there really is no reasonable way to do that  The antibodies will still be present, as she did have it previously  We do not do Lyme cultures, which would try did grow the bacteria that causes Lyme disease  Because of this, we will assume that the Lyme has resolved if she is not having further symptoms  Diagnoses and all orders for this visit:    Lyme disease    Mixed hyperlipidemia  -     atorvastatin (LIPITOR) 10 mg tablet; Take 1 tablet (10 mg total) by mouth daily  -     Comprehensive metabolic panel; Future  -     Lipid panel; Future    Acquired hypothyroidism  -     TSH, 3rd generation;  Future    Moderate episode of recurrent major depressive disorder (HCC)    Frequent headaches  -     Comprehensive metabolic panel; Future  -     TSH, 3rd generation; Future  -     Ambulatory referral to Neurology; Future              Subjective:      Patient ID: Adonay Boy is a 61 y o  female  CC:    Chief Complaint   Patient presents with    Follow-up     3 month f/u to review labs done in feb  Pt offers no complaints  HPI:    Patient is here for multiple issues  With her headaches, she still gets headaches on occasion  When she has them, she has dizziness, nausea  Overall, she does not feel well the entire day  No face or tooth pain along with the HA  She has had some shooting pains on occasion  Sometimes she shakes her head and notes some pains with this  She is not sure about what this is  Hyperlipidemia:  Noted before  Reviewed labs from February  At that point, her LDL was elevated, higher than her 100 goal   HDL was also quite good at that point  In January, LFTs were normal   She is not currently on medications for cholesterol  Given that is elevated, we need to review today  Lyme:  Patient did have line before in August   IgG and IgM were both positive at that point  She was placed on doxycycline for 21 days  She reviewed with me that she was concerned that we do not have a test afterwards to repeat, to see if she has continued Lyme disease issues  Hypothyroid: Has been on Synthroid  No changes noted  Depression: She is still having depression symptoms  She has not been able to see her kids  She is feeling lonely  The following portions of the patient's history were reviewed and updated as appropriate: allergies, current medications, past family history, past medical history, past social history, past surgical history and problem list       Review of Systems   Constitutional: Negative  HENT: Negative  Eyes: Negative  Respiratory: Negative  Cardiovascular: Negative  Gastrointestinal: Negative  Endocrine: Negative  Genitourinary: Negative  Musculoskeletal: Negative  Skin: Negative  Allergic/Immunologic: Negative  Neurological: Negative  Hematological: Negative  Psychiatric/Behavioral: Negative  Data to review:   Labs from February reviewed  Total cholesterol 217, , HDL 54, triglycerides 88  In January, her CMP showed normal AST and ALT  Objective:    Vitals:    05/04/21 1555   BP: 134/84   BP Location: Left arm   Patient Position: Sitting   Cuff Size: Adult   Pulse: 68   Temp: (!) 97 1 °F (36 2 °C)   TempSrc: Temporal   Weight: 55 3 kg (122 lb)   Height: 5' 3" (1 6 m)        Physical Exam  Vitals signs and nursing note reviewed  Constitutional:       Appearance: Normal appearance  HENT:      Head: Normocephalic  Cardiovascular:      Rate and Rhythm: Normal rate and regular rhythm  Pulses: Normal pulses  Carotid pulses are 2+ on the right side and 2+ on the left side  Heart sounds: Normal heart sounds  No murmur  No friction rub  No gallop  Pulmonary:      Effort: Pulmonary effort is normal  No respiratory distress  Breath sounds: Normal breath sounds  No stridor  No wheezing, rhonchi or rales  Chest:      Chest wall: No tenderness  Neurological:      General: No focal deficit present  Mental Status: She is alert and oriented to person, place, and time  Mental status is at baseline  Cranial Nerves: No cranial nerve deficit  Sensory: No sensory deficit  Motor: No weakness        Coordination: Coordination normal       Gait: Gait normal       Deep Tendon Reflexes: Reflexes normal

## 2021-05-04 NOTE — PATIENT INSTRUCTIONS
Problem List Items Addressed This Visit     Depression     Stable  No changes  Follow with next OV  Frequent headaches     Still has some issues with HA  She did try Imitrex, but it did not seem to work much for her when she had it  She is out of the prescription at this point, and again not sure that made any difference  Relevant Orders    Comprehensive metabolic panel    TSH, 3rd generation    Ambulatory referral to Neurology    Hyperlipidemia     Patient's cholesterol in February continued to be elevated  Based on that, recommend that she restart statins  Patient does have atorvastatin statin at home, therefore I would recommend that she start on atorvastatin, 10 mg 1 a day  Check blood work in approximately 3-6 months  Relevant Medications    atorvastatin (LIPITOR) 10 mg tablet    Other Relevant Orders    Comprehensive metabolic panel    Lipid panel    Hypothyroidism     On Synthroid  Check labs  Relevant Orders    TSH, 3rd generation    Lyme disease - Primary     Patient had positive Lyme testing on blood, but was treated with doxycycline for 21 days, suggesting that now there is no longer Lyme disease present  As far as testing for recurrence, there really is no reasonable way to do that  The antibodies will still be present, as she did have it previously  We do not do Lyme cultures, which would try did grow the bacteria that causes Lyme disease  Because of this, we will assume that the Lyme has resolved if she is not having further symptoms  COVID 19 Instructions    Eva Sportsman was advised to limit contact with others to essential tasks such as getting food, medications, and medical care  Proper handwashing reviewed, and Hand sanitzer when washing is not available  If the patient develops symptoms of COVID 19, the patient should call the office as soon as possible      For 5044-5499 Flu season, it is strongly recommended that Flu Vaccinations be obtained  Please try to download [employer] Duo  Once you do download this on your phone, you will be prompted to add your phone number to the account  After that, he should receive a text from [employer], and use that code to verify your phone number  After that, you should be able to use Google Duo to receive and make video calls  Please download Microsoft Teams to your phone or computer  We will be transitioning to this platform for Video Visits  Instructions for downloading this are available from the office  We are committed to getting you vaccinated as soon as possible and will be closely following CDC and [facility]  guidelines as they are released and revised  Please refer to our COVID-19 vaccine webpage for the most up to date information on the vaccine and our distribution efforts      Cy barrera

## 2021-05-04 NOTE — ASSESSMENT & PLAN NOTE
Patient's cholesterol in February continued to be elevated  Based on that, recommend that she restart statins  Patient does have atorvastatin statin at home, therefore I would recommend that she start on atorvastatin, 10 mg 1 a day  Check blood work in approximately 3-6 months

## 2021-05-04 NOTE — ASSESSMENT & PLAN NOTE
Patient had positive Lyme testing on blood, but was treated with doxycycline for 21 days, suggesting that now there is no longer Lyme disease present  As far as testing for recurrence, there really is no reasonable way to do that  The antibodies will still be present, as she did have it previously  We do not do Lyme cultures, which would try did grow the bacteria that causes Lyme disease  Because of this, we will assume that the Lyme has resolved if she is not having further symptoms

## 2021-07-11 DIAGNOSIS — F41.9 ANXIETY: ICD-10-CM

## 2021-07-12 RX ORDER — BUPROPION HYDROCHLORIDE 300 MG/1
TABLET ORAL
Qty: 90 TABLET | Refills: 1 | Status: SHIPPED | OUTPATIENT
Start: 2021-07-12 | End: 2022-01-04

## 2021-07-13 DIAGNOSIS — E03.9 ACQUIRED HYPOTHYROIDISM: ICD-10-CM

## 2021-07-13 RX ORDER — LEVOTHYROXINE SODIUM 0.03 MG/1
TABLET ORAL
Qty: 90 TABLET | Refills: 0 | Status: SHIPPED | OUTPATIENT
Start: 2021-07-13 | End: 2021-10-07

## 2021-08-19 ENCOUNTER — HOSPITAL ENCOUNTER (OUTPATIENT)
Dept: RADIOLOGY | Facility: IMAGING CENTER | Age: 61
Discharge: HOME | End: 2021-08-19
Payer: COMMERCIAL

## 2021-08-19 VITALS — BODY MASS INDEX: 21.62 KG/M2 | WEIGHT: 122 LBS | HEIGHT: 63 IN

## 2021-08-19 DIAGNOSIS — Z12.39 BREAST CANCER SCREENING: ICD-10-CM

## 2021-08-19 PROCEDURE — 77067 SCR MAMMO BI INCL CAD: CPT

## 2021-08-19 PROCEDURE — 77063 BREAST TOMOSYNTHESIS BI: CPT

## 2021-09-29 ENCOUNTER — TELEMEDICINE (OUTPATIENT)
Dept: FAMILY MEDICINE CLINIC | Facility: CLINIC | Age: 61
End: 2021-09-29
Payer: COMMERCIAL

## 2021-09-29 DIAGNOSIS — R49.0 HOARSENESS OF VOICE: Primary | ICD-10-CM

## 2021-09-29 PROCEDURE — 99213 OFFICE O/P EST LOW 20 MIN: CPT | Performed by: PHYSICIAN ASSISTANT

## 2021-09-29 NOTE — PROGRESS NOTES
COVID-19 Outpatient Progress Note    Assessment/Plan:    Problem List Items Addressed This Visit     None      Visit Diagnoses     Hoarseness of voice    -  Primary    Relevant Orders    Ambulatory Referral to Otolaryngology         Disposition:      Assessment/plan:  1  Hoarseness-patient has had chronic coarseness for over a year  This seems to be progressively worsening  She does not have any other obvious signs of esophageal reflux disease, seasonal allergies, or medication changes  She is not smoker but has been exposed to secondhand smoke much of her life  I would recommend she have evaluation by otolaryngologist so that she can have flexible oranges scope  Would like to rule out vocal cord lesions  Patient verbalizes understanding and agreement with plan  She has previously seen Dr Zoe Magana about 2 years ago for a separate issue  She will be referred back to his office  I have spent 15 minutes directly with the patient  Verification of patient location:    Patient is located in the following state in which I hold an active license [place]    Encounter provider Gregory Blanca PA-C    Provider located at [address] [address] [phone]    Recent Visits  No visits were found meeting these conditions  Showing recent visits within past 7 days and meeting all other requirements  Today's Visits  Date Type Provider Dept   09/29/21 Telemedicine Gregory Blanca PA-C [facility]   Showing today's visits and meeting all other requirements  Future Appointments  No visits were found meeting these conditions  Showing future appointments within next 150 days and meeting all other requirements     This virtual check-in was done via [employer] and patient was informed that this is a secure, HIPAA-compliant platform  She agrees to proceed      Patient agrees to participate in a virtual check in via telephone or video visit instead of presenting to the office to address urgent/immediate medical needs  Patient is aware this is a billable service  After connecting through [facility], the patient was identified by name and date of birth  Rony Iverson was informed that this was a telemedicine visit and that the exam was being conducted confidentially over secure lines  My office door was closed  No one else was in the room  Rony Iverson acknowledged consent and understanding of privacy and security of the telemedicine visit  I informed the patient that I have reviewed her record in Epic and presented the opportunity for her to ask any questions regarding the visit today  The patient agreed to participate  Subjective:   Rony Iverson is a 64 y o  female who is concerned about COVID-19  Patient's symptoms include sore throat  Patient denies fever, chills, fatigue, malaise, congestion, rhinorrhea, anosmia, loss of taste, cough, shortness of breath, chest tightness, abdominal pain, nausea, vomiting, diarrhea, myalgias and headaches  HPI:  This is a 28-year-old female who presents via virtual video visit using [employer] platform  She called initially with complaints of sore scratchy throat but she has been having more difficulty with a hoarseness sensation over the past year or more  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 initially but seems to be getting worse where by 9:00 a m  She is having difficulty with her voice  She is not having any symptoms of esophageal reflux disease on a regular basis  She has not had any new medications  She also has a sensation that she has to cough when this occurs and that may last for some time until she is able to clear it out  It used to get better with water drinking but now that does not even seem to help  She notes that her mother seemed to lose her voice at a young age    She is not a smoker but she has been exposed to secondhand smoke much of her life  She does not seem to have any other allergy symptoms such as runny nose, postnasal drip, or watery eyes  Lab Results   Component Value Date    [address] [facility] Not Detected 2021     Past Medical History:   Diagnosis Date    Anxiety     Chronic sinusitis     last assessed 2013    Depression     Disease of thyroid gland     hypothyroidism    Grief reaction     last assessed 2017    Scabies     last assessed 3/27/2015    Sciatica     last assessed 2013    Vitamin D deficiency     Wears partial dentures     upper     Past Surgical History:   Procedure Laterality Date    BLADDER SUSPENSION       SECTION      COLONOSCOPY      AL REPAIR OF NASAL SEPTUM N/A 2019    Procedure: SEPTOPLASTY with Collumellar Strut Reconstruction;  Surgeon: Jacqueline Puckett DO;  [facility];  Service: ENT    AL STEREOTACTIC COMP ASSIST PROC,CRANIAL,EXTRADURAL Left 2019    Procedure: FUNCTIONAL ENDOSCOPIC SINUS SURGERY (FESS) IMAGED GUIDED; Surgeon:  Jacqueline Puckett DO;  Location[facility];  Service: ENT     Current Outpatient Medications   Medication Sig Dispense Refill    atorvastatin (LIPITOR) 10 mg tablet Take 1 tablet (10 mg total) by mouth daily 30 tablet 5    b complex vitamins capsule Take 1 capsule by mouth daily      buPROPion (WELLBUTRIN XL) 300 mg 24 hr tablet TAKE 1 TABLET BY MOUTH EVERY DAY IN THE MORNING 90 tablet 1    calcium carbonate (OS-GABRIELLE) 600 MG tablet Take 600 mg by mouth 2 (two) times a day with meals      cholecalciferol (VITAMIN D3) 1,000 units tablet Take 1,000 Units by mouth daily      levothyroxine 25 mcg tablet TAKE 1 TABLET BY MOUTH EVERY DAY 90 tablet 0    MAGNESIUM PO Take 1 tablet by mouth daily      naproxen sodium (ALEVE) 220 MG tablet Take 2 tablets (440 mg total) by mouth every 12 (twelve) hours as needed for mild pain      Omega-3 Fatty Acids (FISH OIL) 1,000 mg Take by mouth      Premarin vaginal cream APPLY 0 5 APPLICATOR EVERY NIGHT AT BEDTIME FOR 2 WKS AND THEN EVERY OTHER NIGHT AS NEEDED   SUMAtriptan (IMITREX) 50 mg tablet Take 1 tablet (50 mg total) by mouth once as needed for migraine for up to 1 dose May repeat in 1 hour 9 tablet 0    vitamin B-12 (VITAMIN B-12) 1,000 mcg tablet Take by mouth       No current facility-administered medications for this visit  Allergies   Allergen Reactions    Bactrim [Sulfamethoxazole-Trimethoprim] Hives and Rash    Sulfa Antibiotics Hives and Rash    Pregabalin     Cefuroxime GI Intolerance    Ciprofloxacin Nausea Only    Fiorinal [Butalbital-Aspirin-Caffeine] Headache     Can take meloxican       Review of Systems   Constitutional: Negative for chills, fatigue and fever  HENT: Positive for sore throat  Negative for congestion and rhinorrhea  Respiratory: Negative for cough, chest tightness and shortness of breath  Gastrointestinal: Negative for abdominal pain, diarrhea, nausea and vomiting  Musculoskeletal: Negative for myalgias  Neurological: Negative for headaches  Objective: There were no vitals filed for this visit  Physical Exam  Constitutional:       General: She is not in acute distress  Appearance: She is well-developed  HENT:      Head: Normocephalic and atraumatic  Eyes:      General: No scleral icterus  Conjunctiva/sclera: Conjunctivae normal    Pulmonary:      Effort: Pulmonary effort is normal    Skin:     Findings: No rash  Neurological:      Mental Status: She is alert and oriented to person, place, and time  VIRTUAL VISIT DISCLAIMER    Alfredito Amrik verbally agrees to participate in [employer]  Pt is aware that [employer] could be limited without vital signs or the ability to perform a full hands-on physical exam  Erica Saenz understands she or the provider may request at any time to terminate the video visit and request the patient to seek care or treatment in person

## 2021-10-07 DIAGNOSIS — E03.9 ACQUIRED HYPOTHYROIDISM: ICD-10-CM

## 2021-10-07 RX ORDER — LEVOTHYROXINE SODIUM 0.03 MG/1
TABLET ORAL
Qty: 90 TABLET | Refills: 0 | Status: SHIPPED | OUTPATIENT
Start: 2021-10-07 | End: 2022-01-18

## 2021-10-18 ENCOUNTER — EVALUATION (OUTPATIENT)
Dept: SPEECH THERAPY | Facility: REHABILITATION | Age: 61
End: 2021-10-18
Payer: COMMERCIAL

## 2021-10-18 DIAGNOSIS — R49.0 HOARSENESS OF VOICE: ICD-10-CM

## 2021-10-18 DIAGNOSIS — R49.8 OTHER VOICE AND RESONANCE DISORDERS: Primary | ICD-10-CM

## 2021-10-18 DIAGNOSIS — R49.0 MUSCLE TENSION DYSPHONIA: ICD-10-CM

## 2021-10-18 PROCEDURE — 92524 BEHAVRAL QUALIT ANALYS VOICE: CPT

## 2021-10-25 ENCOUNTER — OFFICE VISIT (OUTPATIENT)
Dept: SPEECH THERAPY | Facility: REHABILITATION | Age: 61
End: 2021-10-25
Payer: COMMERCIAL

## 2021-10-25 DIAGNOSIS — R49.0 HOARSENESS OF VOICE: ICD-10-CM

## 2021-10-25 DIAGNOSIS — R49.0 MUSCLE TENSION DYSPHONIA: ICD-10-CM

## 2021-10-25 DIAGNOSIS — R49.8 OTHER VOICE AND RESONANCE DISORDERS: Primary | ICD-10-CM

## 2021-10-25 PROCEDURE — 92507 TX SP LANG VOICE COMM INDIV: CPT

## 2021-11-04 ENCOUNTER — APPOINTMENT (OUTPATIENT)
Dept: SPEECH THERAPY | Facility: REHABILITATION | Age: 61
End: 2021-11-04
Payer: COMMERCIAL

## 2021-11-08 ENCOUNTER — OFFICE VISIT (OUTPATIENT)
Dept: SPEECH THERAPY | Facility: REHABILITATION | Age: 61
End: 2021-11-08
Payer: COMMERCIAL

## 2021-11-08 DIAGNOSIS — R49.0 HOARSENESS OF VOICE: ICD-10-CM

## 2021-11-08 DIAGNOSIS — R49.8 OTHER VOICE AND RESONANCE DISORDERS: Primary | ICD-10-CM

## 2021-11-08 DIAGNOSIS — R49.0 MUSCLE TENSION DYSPHONIA: ICD-10-CM

## 2021-11-08 PROCEDURE — 92507 TX SP LANG VOICE COMM INDIV: CPT

## 2021-11-18 ENCOUNTER — OFFICE VISIT (OUTPATIENT)
Dept: SPEECH THERAPY | Facility: REHABILITATION | Age: 61
End: 2021-11-18
Payer: COMMERCIAL

## 2021-11-18 DIAGNOSIS — R49.8 OTHER VOICE AND RESONANCE DISORDERS: Primary | ICD-10-CM

## 2021-11-18 DIAGNOSIS — R49.0 HOARSENESS OF VOICE: ICD-10-CM

## 2021-11-18 DIAGNOSIS — R49.0 MUSCLE TENSION DYSPHONIA: ICD-10-CM

## 2021-11-18 PROCEDURE — 92507 TX SP LANG VOICE COMM INDIV: CPT

## 2021-11-23 ENCOUNTER — OFFICE VISIT (OUTPATIENT)
Dept: SPEECH THERAPY | Facility: REHABILITATION | Age: 61
End: 2021-11-23
Payer: COMMERCIAL

## 2021-11-23 DIAGNOSIS — R49.0 MUSCLE TENSION DYSPHONIA: ICD-10-CM

## 2021-11-23 DIAGNOSIS — R49.8 OTHER VOICE AND RESONANCE DISORDERS: Primary | ICD-10-CM

## 2021-11-23 DIAGNOSIS — R49.0 HOARSENESS OF VOICE: ICD-10-CM

## 2021-11-23 PROCEDURE — 92507 TX SP LANG VOICE COMM INDIV: CPT

## 2021-12-02 ENCOUNTER — APPOINTMENT (OUTPATIENT)
Dept: SPEECH THERAPY | Facility: REHABILITATION | Age: 61
End: 2021-12-02
Payer: COMMERCIAL

## 2021-12-02 NOTE — PROGRESS NOTES
Speech-Language Pathology Re-Evaluation    Today's date: 2021  Patients name: Angelina Blanton  : 1960  MRN: [id number]  Safety measures: ***  Referring provider: Nelwyn Severin, DO Encounter Diagnosis     ICD-10-CM    1  Other voice and resonance disorders  R49 8    2  Hoarseness of voice  R49 0    3  Muscle tension dysphonia  R49 0          Visit Tracking:  *** (update from last note)    Subjective comments: ***    Patient's goal(s): ***    Assessments    ***    VOICE RE-EVALUATION:      IE (below) indicates the scores from the initial evaluation (***)  1  Voice Handicap Index (VHI): The VHI is a list of 30 statements that many people have used to describe their voices and the effects of their voices on their lives  Patient indicated how frequently she has the same experience using a rating point scale (never = 0, almost never = 1, sometimes = 2, almost always = 3, and always = 4)  Patient's results were as follows:    Subscale: Score: Self-Perceived Impairment Level: IE: Status:   Physical ***/40 {mild/mod/sev:09566} *** {Improvement:22205}   Functional ***/40 {mild/mod/sev:} *** {Improvement:99591}   Emotional ***/40 {mild/mod/sev:} *** {Improvement:02437}          TOTAL ***/120 {mild/mod/sev:59309} *** {Improvement:37532}       PERCEPTUAL VOICE ASSESSMENT:    2  Consensus Auditory Perceptual Evaluation of Voice (CAPE-V): The CAPE-V rates auditory-perceptual qualities of a patients voice  The overall severity, roughness, breathiness, strain, pitch and loudness are rated during several tasks including general conversation, vowel prolongation, and reading of sentences  Patient also read the [place] to assess the coordination of respiration and voice production  The ratings of the abovementioned parameters were plotted on a 100-millimeter scale, with 0 corresponding to typical normal voice and 100 indicating a *** deviant voice      Parameter: Rating: Severity & Perceptual Observations:   *Overall Severity Roughness ***/100 {mild/mod/sev:19539}   Roughness ***/100 {mild/mod/sev:19539}   Breathiness ***/100 {mild/mod/sev:19539}   Strain ***/100 {mild/mod/sev:19539}   Pitch ***/100 {mild/mod/sev:19539}   Loudness ***/100 {mild/mod/sev:19539}   Focus of Resonance *** {mild/mod/sev:19539}       RESPIRATORY EFFICIENCY:   3  S:Z Ratio Task: Patient was instructed to sustain the sounds /s/ and /z/ across three trials to examine the coordination and efficiency of respiration and voice production  Normative data suggests that adults can prolong these sounds for 20-25 seconds  Ratios of 1 4 and above are consistent with laryngeal inefficiency, and ratios of 2 0 and above are suggestive of vocal fold pathology  Task: Trial 1 (sec): Trial 2 (sec): Trial 3 (sec):   /s/ *** *** ***   /z/ *** *** ***     Best /s/: ***  Best /z/: ***      Ratio: *** (IE: ***)       4  Maximum Phonation Time: Patient was instructed to sustain the sound /ah/ across three trials to measure respiratory and laryngeal coordination and efficiency  Adults are typically able to prolong these vowels sound for 15-20 seconds  Reduced MPT may suggest poor respiratory support, or poor medial glottal closure  Trial 1 (sec): Trial 2 (sec): Trial 3 (sec):   *** *** ***     Average Duration: *** seconds (IE: ***)          Goals    ***   Short-term Goals:    1  Patient will increase MPT to >10-15 seconds in order to facilitate improved breath support for speech tasks, to be achieved in 4-6 weeks  2  Pt will utilize diaphragmatic breathing during oral sentence/paragraph reading in 80% of opportunities to facilitate increased breath support for voice/speaking, to be achieved in 4-6 weeks  3  Vocal quality during 1:1 conversation will improve with use of trained compensatory strategies with 80% accuracy, to be achieved in 4-6 weeks      4  Patient will demonstrate the use of trained relaxation exercises with 80% accuracy independently, to decrease upper body tension contributing to dysphonia, to be achieved in 4-6 weeks  Long-term Goals:    1  Patient will improve vocal quality by 80% as self-reported by patient for increased functional communication by discharge  2  Patient will independently utilize trained compensatory strategies with 90% accuracy independently at a conversational level allowing for improved vocal quality for communication by discharge  Impressions/Recommendations    Impressions:   Patient presents with {kmslpseveritylevel:41563} {ksslptypeofevals:80009} difficulties at this time, characterized by *** impacting *** at this time  ***  Patient reports overall severity rating of *** on VHI this date (*** from IE), noted *** MPT (*** from IE), *** vocal quality, ***  Patient has demonstrated ***  Patient would benefit from continued skilled SLP interventions at this time in order to facilitate improved {ksslprequiresslp:30669} and to train in strategies in order to facilitate improved *** allowing for improved communication accuracy and improved vocal quality within environment         Recommendations:  -Patient would benefit from outpatient skilled Speech Therapy services : {BENEFIT OUTN:2998489}    -Frequency: {FREQUENCY:27695}  -Duration: {DURATION:20983}    -Intervention certification from: 93/5/9772  -Intervention certification to: ***    -Intervention comments:   ***

## 2021-12-06 ENCOUNTER — EVALUATION (OUTPATIENT)
Dept: SPEECH THERAPY | Facility: REHABILITATION | Age: 61
End: 2021-12-06
Payer: COMMERCIAL

## 2021-12-06 DIAGNOSIS — R49.8 OTHER VOICE AND RESONANCE DISORDERS: Primary | ICD-10-CM

## 2021-12-06 DIAGNOSIS — R49.0 HOARSENESS OF VOICE: ICD-10-CM

## 2021-12-06 DIAGNOSIS — R49.0 MUSCLE TENSION DYSPHONIA: ICD-10-CM

## 2021-12-06 PROCEDURE — 92507 TX SP LANG VOICE COMM INDIV: CPT

## 2021-12-16 ENCOUNTER — OFFICE VISIT (OUTPATIENT)
Dept: SPEECH THERAPY | Facility: REHABILITATION | Age: 61
End: 2021-12-16
Payer: COMMERCIAL

## 2021-12-16 DIAGNOSIS — R49.0 HOARSENESS OF VOICE: ICD-10-CM

## 2021-12-16 DIAGNOSIS — R49.0 MUSCLE TENSION DYSPHONIA: ICD-10-CM

## 2021-12-16 DIAGNOSIS — R49.8 OTHER VOICE AND RESONANCE DISORDERS: Primary | ICD-10-CM

## 2021-12-16 PROCEDURE — 92507 TX SP LANG VOICE COMM INDIV: CPT

## 2021-12-20 ENCOUNTER — OFFICE VISIT (OUTPATIENT)
Dept: SPEECH THERAPY | Facility: REHABILITATION | Age: 61
End: 2021-12-20
Payer: COMMERCIAL

## 2021-12-20 DIAGNOSIS — R49.8 OTHER VOICE AND RESONANCE DISORDERS: Primary | ICD-10-CM

## 2021-12-20 DIAGNOSIS — R49.0 HOARSENESS OF VOICE: ICD-10-CM

## 2021-12-20 DIAGNOSIS — R49.0 MUSCLE TENSION DYSPHONIA: ICD-10-CM

## 2021-12-20 PROCEDURE — 92507 TX SP LANG VOICE COMM INDIV: CPT

## 2021-12-27 ENCOUNTER — APPOINTMENT (OUTPATIENT)
Dept: SPEECH THERAPY | Facility: REHABILITATION | Age: 61
End: 2021-12-27
Payer: COMMERCIAL

## 2021-12-30 ENCOUNTER — OFFICE VISIT (OUTPATIENT)
Dept: SPEECH THERAPY | Facility: REHABILITATION | Age: 61
End: 2021-12-30
Payer: COMMERCIAL

## 2021-12-30 DIAGNOSIS — R49.0 HOARSENESS OF VOICE: ICD-10-CM

## 2021-12-30 DIAGNOSIS — R49.8 OTHER VOICE AND RESONANCE DISORDERS: Primary | ICD-10-CM

## 2021-12-30 DIAGNOSIS — R49.0 MUSCLE TENSION DYSPHONIA: ICD-10-CM

## 2021-12-30 PROCEDURE — 92507 TX SP LANG VOICE COMM INDIV: CPT

## 2022-01-03 ENCOUNTER — OFFICE VISIT (OUTPATIENT)
Dept: SPEECH THERAPY | Facility: REHABILITATION | Age: 62
End: 2022-01-03
Payer: COMMERCIAL

## 2022-01-03 DIAGNOSIS — R49.0 MUSCLE TENSION DYSPHONIA: ICD-10-CM

## 2022-01-03 DIAGNOSIS — R49.8 OTHER VOICE AND RESONANCE DISORDERS: Primary | ICD-10-CM

## 2022-01-03 DIAGNOSIS — R49.0 HOARSENESS OF VOICE: ICD-10-CM

## 2022-01-03 PROCEDURE — 92507 TX SP LANG VOICE COMM INDIV: CPT

## 2022-01-03 NOTE — PROGRESS NOTES
Daily Speech Treatment Note    Today's date: 1/3/2022  Patients name: Annette Gray  : 1960  MRN: [id number]  Safety measures: medication allergies  Referring provider: Briana Montes, *    Encounter Diagnosis     ICD-10-CM    1  Other voice and resonance disorders  R49 8    2  Hoarseness of voice  R49 0    3  Muscle tension dysphonia  R49 0        Visit tracking:  -Referring provider: [address] [place] [address] guidelines: AMA  -Visit # (per benefit year) 9 previous year  -Insurance: [employer] (12 visit limit per benefit year)  -RE due  2021    Subjective/Behavioral:    -Reports voice is strained today (reports throat scratchy)  -VQ is good upon approach    Objective/Assessment:  -Patient education on plan of therapy, patient verbalized understanding  Short-term goals:    1  Patient will increase MPT to >10-15 seconds in order to facilitate improved breath support for speech tasks, to be achieved in 4-6 weeks  --GOAL MET    2  Pt will utilize diaphragmatic breathing during oral sentence/paragraph reading in 80% of opportunities to facilitate increased breath support for voice/speaking, to be achieved in 4-6 weeks --CONTINUE    Patient trained in use of diaphragmatic breathing during conversational level tasks, provided mild verbal cues in order to facilitate improved breath support for speech tasks  3  Vocal quality during 1:1 conversation will improve with use of trained compensatory strategies with 80% accuracy, to be achieved in 4-6 weeks --CONTINUE    Patient trained in use of resonant voice during conversational level tasks provided min levels for improved accuracy, allowing for improved vocal function  Improved self monitoring noted this date and reduced reported vocal fatigue with use of strategies       4  Patient will demonstrate the use of trained relaxation exercises with 80% accuracy independently, to decrease upper body tension contributing to dysphonia, to be achieved in 4-6 weeks --CONTINUE      Plan:  -Continue with current plan of care

## 2022-01-04 DIAGNOSIS — F41.9 ANXIETY: ICD-10-CM

## 2022-01-04 RX ORDER — BUPROPION HYDROCHLORIDE 300 MG/1
TABLET ORAL
Qty: 90 TABLET | Refills: 1 | Status: SHIPPED | OUTPATIENT
Start: 2022-01-04

## 2022-01-06 ENCOUNTER — APPOINTMENT (OUTPATIENT)
Dept: SPEECH THERAPY | Facility: REHABILITATION | Age: 62
End: 2022-01-06
Payer: COMMERCIAL

## 2022-01-13 ENCOUNTER — OFFICE VISIT (OUTPATIENT)
Dept: SPEECH THERAPY | Facility: REHABILITATION | Age: 62
End: 2022-01-13
Payer: COMMERCIAL

## 2022-01-13 DIAGNOSIS — R49.0 HOARSENESS OF VOICE: ICD-10-CM

## 2022-01-13 DIAGNOSIS — R49.8 OTHER VOICE AND RESONANCE DISORDERS: Primary | ICD-10-CM

## 2022-01-13 DIAGNOSIS — R49.0 MUSCLE TENSION DYSPHONIA: ICD-10-CM

## 2022-01-13 PROCEDURE — 92507 TX SP LANG VOICE COMM INDIV: CPT

## 2022-01-13 NOTE — PROGRESS NOTES
Daily Speech Treatment Note    Today's date: 2022  Patients name: Rafaela Barrow  : 1960  MRN: [id number]  Safety measures: medication allergies  Referring provider: Albert Taylor, *    Encounter Diagnosis     ICD-10-CM    1  Other voice and resonance disorders  R49 8    2  Hoarseness of voice  R49 0    3  Muscle tension dysphonia  R49 0        Visit tracking:  -Referring provider: Epic  -Billing guidelines: AMA  -Visit #2/12 (per benefit year) 9 previous year  -Insurance: [employer] (12 visit limit per benefit year)  -RE due  2021    Subjective/Behavioral:    -Reports voice is holding up better throughout day/week    Objective/Assessment:  -Patient education on plan of therapy, patient verbalized understanding  Short-term goals:    1  Patient will increase MPT to >10-15 seconds in order to facilitate improved breath support for speech tasks, to be achieved in 4-6 weeks  --GOAL MET    2  Pt will utilize diaphragmatic breathing during oral sentence/paragraph reading in 80% of opportunities to facilitate increased breath support for voice/speaking, to be achieved in 4-6 weeks --CONTINUE    Patient trained in use of diaphragmatic breathing during conversational level tasks, provided min verbal cues in order to facilitate improved breath support for speech tasks  3  Vocal quality during 1:1 conversation will improve with use of trained compensatory strategies with 80% accuracy, to be achieved in 4-6 weeks --CONTINUE    Patient trained in use of resonant voice during conversational level tasks provided min levels for improved accuracy, allowing for improved vocal function  Improved self monitoring noted this date and reduced reported vocal fatigue with use of strategies  Patient with good use of resonant voice with min cues during [place] and [place] conversational tasks       4  Patient will demonstrate the use of trained relaxation exercises with 80% accuracy independently, to decrease upper body tension contributing to dysphonia, to be achieved in 4-6 weeks --CONTINUE      Plan:  -Continue with current plan of care

## 2022-01-15 DIAGNOSIS — E03.9 ACQUIRED HYPOTHYROIDISM: ICD-10-CM

## 2022-01-17 ENCOUNTER — OFFICE VISIT (OUTPATIENT)
Dept: FAMILY MEDICINE CLINIC | Facility: CLINIC | Age: 62
End: 2022-01-17
Payer: COMMERCIAL

## 2022-01-17 VITALS
HEART RATE: 68 BPM | SYSTOLIC BLOOD PRESSURE: 122 MMHG | OXYGEN SATURATION: 99 % | WEIGHT: 124 LBS | DIASTOLIC BLOOD PRESSURE: 70 MMHG | BODY MASS INDEX: 21.97 KG/M2 | HEIGHT: 63 IN | RESPIRATION RATE: 18 BRPM

## 2022-01-17 DIAGNOSIS — F41.1 GAD (GENERALIZED ANXIETY DISORDER): ICD-10-CM

## 2022-01-17 DIAGNOSIS — E78.2 MIXED HYPERLIPIDEMIA: ICD-10-CM

## 2022-01-17 DIAGNOSIS — D65 DEFIBRINATION SYNDROME (HCC): ICD-10-CM

## 2022-01-17 DIAGNOSIS — Z13.1 SCREENING FOR DIABETES MELLITUS: ICD-10-CM

## 2022-01-17 DIAGNOSIS — R30.0 DYSURIA: Primary | ICD-10-CM

## 2022-01-17 DIAGNOSIS — E03.9 ACQUIRED HYPOTHYROIDISM: ICD-10-CM

## 2022-01-17 PROCEDURE — 99214 OFFICE O/P EST MOD 30 MIN: CPT | Performed by: NURSE PRACTITIONER

## 2022-01-17 RX ORDER — PHENAZOPYRIDINE HYDROCHLORIDE 100 MG/1
100 TABLET, FILM COATED ORAL 3 TIMES DAILY PRN
Qty: 10 TABLET | Refills: 0 | Status: SHIPPED | OUTPATIENT
Start: 2022-01-17

## 2022-01-17 RX ORDER — NITROFURANTOIN 25; 75 MG/1; MG/1
100 CAPSULE ORAL 2 TIMES DAILY
Qty: 10 CAPSULE | Refills: 0 | Status: SHIPPED | OUTPATIENT
Start: 2022-01-17 | End: 2022-01-22

## 2022-01-17 NOTE — ASSESSMENT & PLAN NOTE
Patient was unable to provide urine sample in the office tonight so UA was ordered to be collected at the lab as soon as possible tomorrow  Patient does have symptoms consistent with UTI so I will empirically treat her with 5 day course of Macrobid and Pyridium as needed

## 2022-01-17 NOTE — PATIENT INSTRUCTIONS

## 2022-01-17 NOTE — PROGRESS NOTES
Assessment and Plan:    Problem List Items Addressed This Visit        Endocrine    Hypothyroidism     TSH level ordered to assess the status of this  Relevant Orders    TSH, 3rd generation       Hematopoietic and Hemostatic    Defibrination syndrome ([address] )     No current issues regarding this  CBC was ordered to assess the status of this  Relevant Orders    CBC and Platelet       Other    CIERA (generalized anxiety disorder)     Well controlled on current regimen  Hyperlipidemia     Lipid panel ordered to assess the status of this  Relevant Orders    Lipid Panel with Direct LDL reflex    Dysuria - Primary     Patient was unable to provide urine sample in the office tonight so UA was ordered to be collected at the lab as soon as possible tomorrow  Patient does have symptoms consistent with UTI so I will empirically treat her with 5 day course of Macrobid and Pyridium as needed  Relevant Medications    nitrofurantoin (MACROBID) 100 mg capsule    phenazopyridine (PYRIDIUM) 100 mg tablet    Other Relevant Orders    UA w Reflex to Microscopic w Reflex to Culture -Lab Collect      Other Visit Diagnoses     Screening for diabetes mellitus        Relevant Orders    Comprehensive metabolic panel                 Diagnoses and all orders for this visit:    Dysuria  -     Cancel: POCT urine dip  -     nitrofurantoin (MACROBID) 100 mg capsule; Take 1 capsule (100 mg total) by mouth 2 (two) times a day for 5 days  -     phenazopyridine (PYRIDIUM) 100 mg tablet; Take 1 tablet (100 mg total) by mouth 3 (three) times a day as needed for bladder spasms  -     UA w Reflex to Microscopic w Reflex to Culture -Lab Collect; Future    Defibrination syndrome (HCC)  -     CBC and Platelet; Future    Mixed hyperlipidemia  -     Lipid Panel with Direct LDL reflex; Future    Acquired hypothyroidism  -     TSH, 3rd generation;  Future    Screening for diabetes mellitus  -     Comprehensive metabolic panel; Future    CIERA (generalized anxiety disorder)              Subjective:      Patient ID: Shraddha Ferrer is a 64 y o  female  CC:    Chief Complaint   Patient presents with    Possible UTI     patient states urine has a fowl odor,  days x 4    Painful Urination    Urinary Frequency       HPI:    UTI symptoms: Patient reports over the past 4 days she has been experiencing dysuria, foul smelling urine, and incomplete emptying  She also feels as though she has to urinate but is unable to frequently  She does report increased frequency and urgency  She denies fevers, chills, or lower back pain  She does report a history of UTI's in the past   Patient was unable to provide a urine specimen in the office today  Hypothyroidism:  Patient's most recent TSH was completed in January 2021 and was noted to be elevated  Patient is currently managed on levothyroxine 25 mcg daily  Patient denies any cold intolerance  Patient does report she feels like she has been having increased fatigue and weight gain  Patient did gain 2 lb since her last office visit  Defibrination syndrome:  Patient denies any current issues regarding this  Patient's most recent CBC was completed in January 2021 and was noted to be normal     CIREA:  Well controlled with daily use of Wellbutrin  Patient denies any palpitations or panic attacks  Hyperlipidemia:  Patient's most recent lipid panel was completed in February 2021 and did show slightly elevated total cholesterol and LDL  Patient is currently managed on fish oil supplement daily  The following portions of the patient's history were reviewed and updated as appropriate: allergies, current medications, past family history, past medical history, past social history, past surgical history and problem list       Review of Systems   Constitutional: Positive for fatigue and unexpected weight change (weight gain)  Negative for chills and fever     HENT: Negative for ear pain and sore throat  Eyes: Negative for pain and visual disturbance  Respiratory: Negative for cough, chest tightness, shortness of breath and wheezing  Cardiovascular: Negative for chest pain, palpitations and leg swelling  Gastrointestinal: Negative for abdominal pain, constipation, diarrhea, nausea and vomiting  Endocrine: Negative for cold intolerance and heat intolerance  Genitourinary: Positive for decreased urine volume, difficulty urinating, dysuria, frequency and urgency  Negative for flank pain and hematuria  Musculoskeletal: Negative for arthralgias, back pain and myalgias  Skin: Negative for color change and rash  Allergic/Immunologic: Negative for environmental allergies  Neurological: Negative for dizziness, seizures, syncope, weakness, light-headedness, numbness and headaches  Hematological: Negative for adenopathy  Psychiatric/Behavioral: Negative for confusion  The patient is not nervous/anxious  All other systems reviewed and are negative  Data to review:       Objective:    Vitals:    01/17/22 1808   BP: 122/70   BP Location: Left arm   Patient Position: Sitting   Cuff Size: Adult   Pulse: 68   Resp: 18   SpO2: 99%   Weight: 56 2 kg (124 lb)   Height: 5' 3" (1 6 m)        Physical Exam  Vitals and nursing note reviewed  Constitutional:       General: She is not in acute distress  Appearance: Normal appearance  She is well-developed  She is not ill-appearing  HENT:      Head: Normocephalic and atraumatic  Eyes:      Conjunctiva/sclera: Conjunctivae normal    Cardiovascular:      Rate and Rhythm: Normal rate and regular rhythm  Pulses: Normal pulses  Carotid pulses are 2+ on the right side and 2+ on the left side  Posterior tibial pulses are 2+ on the right side and 2+ on the left side  Heart sounds: Normal heart sounds  No murmur heard  Pulmonary:      Effort: Pulmonary effort is normal  No respiratory distress        Breath sounds: Normal breath sounds  No wheezing or rhonchi  Abdominal:      General: Abdomen is flat  Bowel sounds are normal  There is no distension  Palpations: Abdomen is soft  Tenderness: There is no abdominal tenderness  There is no right CVA tenderness, left CVA tenderness or guarding  Musculoskeletal:         General: Normal range of motion  Cervical back: Normal range of motion and neck supple  Right lower leg: No edema  Left lower leg: No edema  Skin:     General: Skin is warm and dry  Capillary Refill: Capillary refill takes less than 2 seconds  Neurological:      General: No focal deficit present  Mental Status: She is alert and oriented to person, place, and time  Psychiatric:         Mood and Affect: Mood normal          Behavior: Behavior normal          Thought Content:  Thought content normal          Judgment: Judgment normal

## 2022-01-18 ENCOUNTER — APPOINTMENT (OUTPATIENT)
Dept: LAB | Facility: CLINIC | Age: 62
End: 2022-01-18
Payer: COMMERCIAL

## 2022-01-18 DIAGNOSIS — R30.0 DYSURIA: ICD-10-CM

## 2022-01-18 LAB
AMORPH URATE CRY URNS QL MICRO: ABNORMAL /HPF
BACTERIA UR QL AUTO: ABNORMAL /HPF
BILIRUB UR QL STRIP: NEGATIVE
CLARITY UR: ABNORMAL
COLOR UR: YELLOW
GLUCOSE UR STRIP-MCNC: NEGATIVE MG/DL
HGB UR QL STRIP.AUTO: ABNORMAL
KETONES UR STRIP-MCNC: NEGATIVE MG/DL
LEUKOCYTE ESTERASE UR QL STRIP: ABNORMAL
NITRITE UR QL STRIP: POSITIVE
NON-SQ EPI CELLS URNS QL MICRO: ABNORMAL /HPF
PH UR STRIP.AUTO: 8.5 [PH]
PROT UR STRIP-MCNC: NEGATIVE MG/DL
RBC #/AREA URNS AUTO: ABNORMAL /HPF
SP GR UR STRIP.AUTO: 1.02 (ref 1–1.03)
UROBILINOGEN UR QL STRIP.AUTO: 0.2 E.U./DL
WBC #/AREA URNS AUTO: ABNORMAL /HPF

## 2022-01-18 PROCEDURE — 87077 CULTURE AEROBIC IDENTIFY: CPT

## 2022-01-18 PROCEDURE — 87186 SC STD MICRODIL/AGAR DIL: CPT

## 2022-01-18 PROCEDURE — 87086 URINE CULTURE/COLONY COUNT: CPT

## 2022-01-18 PROCEDURE — 81001 URINALYSIS AUTO W/SCOPE: CPT

## 2022-01-18 RX ORDER — LEVOTHYROXINE SODIUM 0.03 MG/1
TABLET ORAL
Qty: 90 TABLET | Refills: 0 | Status: SHIPPED | OUTPATIENT
Start: 2022-01-18 | End: 2022-04-11

## 2022-01-20 ENCOUNTER — APPOINTMENT (OUTPATIENT)
Dept: SPEECH THERAPY | Facility: REHABILITATION | Age: 62
End: 2022-01-20
Payer: COMMERCIAL

## 2022-01-20 LAB — BACTERIA UR CULT: ABNORMAL

## 2022-01-26 ENCOUNTER — OFFICE VISIT (OUTPATIENT)
Dept: FAMILY MEDICINE CLINIC | Facility: CLINIC | Age: 62
End: 2022-01-26
Payer: COMMERCIAL

## 2022-01-26 VITALS
DIASTOLIC BLOOD PRESSURE: 64 MMHG | WEIGHT: 122.5 LBS | SYSTOLIC BLOOD PRESSURE: 116 MMHG | BODY MASS INDEX: 21.71 KG/M2 | TEMPERATURE: 97.7 F | HEIGHT: 63 IN

## 2022-01-26 DIAGNOSIS — N30.00 ACUTE CYSTITIS WITHOUT HEMATURIA: Primary | ICD-10-CM

## 2022-01-26 DIAGNOSIS — R35.0 FREQUENT URINATION: ICD-10-CM

## 2022-01-26 DIAGNOSIS — J30.9 ALLERGIC RHINITIS, UNSPECIFIED SEASONALITY, UNSPECIFIED TRIGGER: ICD-10-CM

## 2022-01-26 DIAGNOSIS — E03.9 ACQUIRED HYPOTHYROIDISM: ICD-10-CM

## 2022-01-26 LAB
SL AMB  POCT GLUCOSE, UA: NORMAL
SL AMB LEUKOCYTE ESTERASE,UA: NORMAL
SL AMB POCT BILIRUBIN,UA: NORMAL
SL AMB POCT BLOOD,UA: NORMAL
SL AMB POCT CLARITY,UA: NORMAL
SL AMB POCT COLOR,UA: NORMAL
SL AMB POCT KETONES,UA: NORMAL
SL AMB POCT NITRITE,UA: NORMAL
SL AMB POCT PH,UA: 5.5
SL AMB POCT SPECIFIC GRAVITY,UA: 1.02
SL AMB POCT URINE PROTEIN: NORMAL
SL AMB POCT UROBILINOGEN: 0.2

## 2022-01-26 PROCEDURE — 99214 OFFICE O/P EST MOD 30 MIN: CPT | Performed by: NURSE PRACTITIONER

## 2022-01-26 PROCEDURE — 3008F BODY MASS INDEX DOCD: CPT | Performed by: NURSE PRACTITIONER

## 2022-01-26 PROCEDURE — 81002 URINALYSIS NONAUTO W/O SCOPE: CPT | Performed by: NURSE PRACTITIONER

## 2022-01-26 PROCEDURE — 87086 URINE CULTURE/COLONY COUNT: CPT | Performed by: NURSE PRACTITIONER

## 2022-01-26 PROCEDURE — 1036F TOBACCO NON-USER: CPT | Performed by: NURSE PRACTITIONER

## 2022-01-26 RX ORDER — AMOXICILLIN 500 MG/1
500 CAPSULE ORAL EVERY 8 HOURS SCHEDULED
Qty: 30 CAPSULE | Refills: 0 | Status: CANCELLED | OUTPATIENT
Start: 2022-01-26 | End: 2022-02-05

## 2022-01-26 RX ORDER — TETRACYCLINE HYDROCHLORIDE 500 MG/1
500 CAPSULE ORAL 2 TIMES DAILY
Qty: 20 CAPSULE | Refills: 0 | Status: SHIPPED | OUTPATIENT
Start: 2022-01-26 | End: 2022-02-05

## 2022-01-26 NOTE — PROGRESS NOTES
Assessment and Plan:    Problem List Items Addressed This Visit        Endocrine    Hypothyroidism     TSH level ordered to be drawn prior to next office visit  Respiratory    Allergic rhinitis     No current issues regarding this  Genitourinary    Acute cystitis without hematuria - Primary     Patient does have multiple reported allergies to antibiotics and Macrobid was ineffective at completely resolving infection so patient will be started on 10 day course of tetracycline  If this is once again ineffective at clearing infection patient will be referred to urogynecology  Relevant Medications    tetracycline (ACHROMYCIN,SUMYCIN) 500 MG capsule      Other Visit Diagnoses     Frequent urination        Relevant Orders    POCT urine dip (Completed)    Urine culture                 Diagnoses and all orders for this visit:    Acute cystitis without hematuria  -     tetracycline (ACHROMYCIN,SUMYCIN) 500 MG capsule; Take 1 capsule (500 mg total) by mouth 2 (two) times a day for 10 days    Frequent urination  -     POCT urine dip  -     Urine culture; Future  -     Urine culture    Acquired hypothyroidism    Allergic rhinitis, unspecified seasonality, unspecified trigger              Subjective:      Patient ID: Balaji Naidupool is a 64 y o  female  CC:    Chief Complaint   Patient presents with    Urinary Frequency     pain pressure x last visit pt feels it did not go away all together    mgb    Back Pain     lower  mgb       HPI:    UTI:  Patient was originally seen for an office visit on 01/17/2022 for UTI symptoms  Urine dip in the office that day did confirm a UTI and patient was treated with 5 day course of Macrobid  Urine culture did show E coli which is susceptible to Macrobid  Urine dip in the office today did show trace leukocyte esterase but was normal otherwise  Patient reports she has bene experiencing lower back pain, frequency, urgency, and intermittent chills   She denies dysuria, incomplete emptying, fevers, or chills  Hypothyroidism:  Patient is currently not on thyroid medication  TSH level was ordered at last office visit but has not been completed yet  Allergic rhinitis: The patient denies any current issues regarding this  The patient is not currently taking medication for this  The following portions of the patient's history were reviewed and updated as appropriate: allergies, current medications, past family history, past medical history, past social history, past surgical history and problem list       Review of Systems   Constitutional: Negative for chills and fever  HENT: Negative for ear pain and sore throat  Eyes: Negative for pain and visual disturbance  Respiratory: Negative for cough, chest tightness, shortness of breath and wheezing  Cardiovascular: Negative for chest pain, palpitations and leg swelling  Gastrointestinal: Negative for abdominal pain, constipation, diarrhea, nausea and vomiting  Endocrine: Negative for cold intolerance and heat intolerance  Genitourinary: Positive for dysuria, frequency and urgency  Negative for decreased urine volume, flank pain and hematuria  Musculoskeletal: Negative for arthralgias, back pain and myalgias  Skin: Negative for color change and rash  Allergic/Immunologic: Positive for environmental allergies  Neurological: Negative for dizziness, seizures, syncope, weakness, light-headedness, numbness and headaches  Hematological: Negative for adenopathy  Psychiatric/Behavioral: Negative for confusion  The patient is not nervous/anxious  All other systems reviewed and are negative  Data to review:       Objective:    Vitals:    01/26/22 1555   BP: 116/64   BP Location: Left arm   Patient Position: Sitting   Cuff Size: Standard   Temp: 97 7 °F (36 5 °C)   TempSrc: Temporal   Weight: 55 6 kg (122 lb 8 oz)   Height: 5' 3" (1 6 m)        Physical Exam  Vitals and nursing note reviewed  Constitutional:       General: She is not in acute distress  Appearance: Normal appearance  She is well-developed  She is not ill-appearing  HENT:      Head: Normocephalic and atraumatic  Eyes:      Conjunctiva/sclera: Conjunctivae normal    Cardiovascular:      Rate and Rhythm: Normal rate and regular rhythm  Pulses: Normal pulses  Carotid pulses are 2+ on the right side and 2+ on the left side  Posterior tibial pulses are 2+ on the right side and 2+ on the left side  Heart sounds: Normal heart sounds  No murmur heard  Pulmonary:      Effort: Pulmonary effort is normal  No respiratory distress  Breath sounds: Normal breath sounds  No wheezing or rhonchi  Abdominal:      General: Abdomen is flat  Bowel sounds are normal  There is no distension  Palpations: Abdomen is soft  There is no mass  Tenderness: There is no abdominal tenderness  There is left CVA tenderness  There is no right CVA tenderness, guarding or rebound  Hernia: No hernia is present  Comments: Patient does report some lower pelvic pressure  Musculoskeletal:         General: Normal range of motion  Cervical back: Normal range of motion and neck supple  Right lower leg: No edema  Left lower leg: No edema  Skin:     General: Skin is warm and dry  Capillary Refill: Capillary refill takes less than 2 seconds  Neurological:      General: No focal deficit present  Mental Status: She is alert and oriented to person, place, and time  Psychiatric:         Mood and Affect: Mood normal          Behavior: Behavior normal          Thought Content:  Thought content normal          Judgment: Judgment normal

## 2022-01-26 NOTE — ASSESSMENT & PLAN NOTE
Patient does have multiple reported allergies to antibiotics and Macrobid was ineffective at completely resolving infection so patient will be started on 10 day course of tetracycline  If this is once again ineffective at clearing infection patient will be referred to urogynecology

## 2022-01-27 ENCOUNTER — APPOINTMENT (OUTPATIENT)
Dept: LAB | Facility: HOSPITAL | Age: 62
End: 2022-01-27
Payer: COMMERCIAL

## 2022-01-27 DIAGNOSIS — E78.2 MIXED HYPERLIPIDEMIA: Primary | ICD-10-CM

## 2022-01-27 DIAGNOSIS — Z13.1 SCREENING FOR DIABETES MELLITUS: ICD-10-CM

## 2022-01-27 DIAGNOSIS — E03.9 ACQUIRED HYPOTHYROIDISM: ICD-10-CM

## 2022-01-27 DIAGNOSIS — D65 DEFIBRINATION SYNDROME (HCC): ICD-10-CM

## 2022-01-27 DIAGNOSIS — E78.2 MIXED HYPERLIPIDEMIA: ICD-10-CM

## 2022-01-27 LAB
ALBUMIN SERPL BCP-MCNC: 3.9 G/DL (ref 3.5–5)
ALP SERPL-CCNC: 65 U/L (ref 46–116)
ALT SERPL W P-5'-P-CCNC: 44 U/L (ref 12–78)
ANION GAP SERPL CALCULATED.3IONS-SCNC: 7 MMOL/L (ref 4–13)
AST SERPL W P-5'-P-CCNC: 26 U/L (ref 5–45)
BILIRUB SERPL-MCNC: 0.54 MG/DL (ref 0.2–1)
BUN SERPL-MCNC: 20 MG/DL (ref 5–25)
CALCIUM SERPL-MCNC: 8.5 MG/DL (ref 8.3–10.1)
CHLORIDE SERPL-SCNC: 105 MMOL/L (ref 100–108)
CHOLEST SERPL-MCNC: 255 MG/DL
CO2 SERPL-SCNC: 30 MMOL/L (ref 21–32)
CREAT SERPL-MCNC: 0.9 MG/DL (ref 0.6–1.3)
ERYTHROCYTE [DISTWIDTH] IN BLOOD BY AUTOMATED COUNT: 13.1 % (ref 11.6–15.1)
GFR SERPL CREATININE-BSD FRML MDRD: 69 ML/MIN/1.73SQ M
GLUCOSE P FAST SERPL-MCNC: 91 MG/DL (ref 65–99)
HCT VFR BLD AUTO: 44.5 % (ref 34.8–46.1)
HDLC SERPL-MCNC: 45 MG/DL
HGB BLD-MCNC: 14.4 G/DL (ref 11.5–15.4)
LDLC SERPL CALC-MCNC: 184 MG/DL (ref 0–100)
MCH RBC QN AUTO: 29.4 PG (ref 26.8–34.3)
MCHC RBC AUTO-ENTMCNC: 32.4 G/DL (ref 31.4–37.4)
MCV RBC AUTO: 91 FL (ref 82–98)
PLATELET # BLD AUTO: 256 THOUSANDS/UL (ref 149–390)
PMV BLD AUTO: 8.8 FL (ref 8.9–12.7)
POTASSIUM SERPL-SCNC: 4.1 MMOL/L (ref 3.5–5.3)
PROT SERPL-MCNC: 7.3 G/DL (ref 6.4–8.2)
RBC # BLD AUTO: 4.9 MILLION/UL (ref 3.81–5.12)
SODIUM SERPL-SCNC: 142 MMOL/L (ref 136–145)
TRIGL SERPL-MCNC: 132 MG/DL
TSH SERPL DL<=0.05 MIU/L-ACNC: 2.6 UIU/ML (ref 0.36–3.74)
WBC # BLD AUTO: 5.71 THOUSAND/UL (ref 4.31–10.16)

## 2022-01-27 PROCEDURE — 85027 COMPLETE CBC AUTOMATED: CPT

## 2022-01-27 PROCEDURE — 36415 COLL VENOUS BLD VENIPUNCTURE: CPT

## 2022-01-27 PROCEDURE — 80061 LIPID PANEL: CPT

## 2022-01-27 PROCEDURE — 80053 COMPREHEN METABOLIC PANEL: CPT

## 2022-01-27 PROCEDURE — 84443 ASSAY THYROID STIM HORMONE: CPT

## 2022-01-27 RX ORDER — ATORVASTATIN CALCIUM 10 MG/1
10 TABLET, FILM COATED ORAL DAILY
Qty: 30 TABLET | Refills: 2 | Status: SHIPPED | OUTPATIENT
Start: 2022-01-27

## 2022-01-28 LAB — BACTERIA UR CULT: NORMAL

## 2022-02-09 ENCOUNTER — OFFICE VISIT (OUTPATIENT)
Dept: SPEECH THERAPY | Facility: REHABILITATION | Age: 62
End: 2022-02-09
Payer: COMMERCIAL

## 2022-02-09 DIAGNOSIS — R49.0 HOARSENESS OF VOICE: ICD-10-CM

## 2022-02-09 DIAGNOSIS — R49.0 MUSCLE TENSION DYSPHONIA: ICD-10-CM

## 2022-02-09 DIAGNOSIS — R49.8 OTHER VOICE AND RESONANCE DISORDERS: Primary | ICD-10-CM

## 2022-02-09 PROCEDURE — 92507 TX SP LANG VOICE COMM INDIV: CPT

## 2022-02-09 NOTE — PROGRESS NOTES
Speech-Language Pathology Discharge    Today's date: 2022  Patients name: Mary Ramos  : 1960  MRN: [id number]  Safety measures: medication allergies  Referring provider: Abhinav Pope, *    Encounter Diagnosis     ICD-10-CM    1  Other voice and resonance disorders  R49 8    2  Hoarseness of voice  R49 0    3  Muscle tension dysphonia  R49 0        Visit tracking:  -Referring provider: Epic  -Billing guidelines: AMA  -Visit #3/12 (per benefit year) 9 previous year  -Insurance: Over 40 Females (12 visit limit per benefit year)      Subjective comments: Patient reports 90% IMPROVEMENT in voice since start of care  Patient's goal(s): to improve voice     Assessments    VOICE RE-EVALUATION:      RE (below) indicates the scores from the re- evaluation (2021)  1  Voice Handicap Index (VHI): The VHI is a list of 30 statements that many people have used to describe their voices and the effects of their voices on their lives  Patient indicated how frequently she has the same experience using a rating point scale (never = 0, almost never = 1, sometimes = 2, almost always = 3, and always = 4)  Patient's results were as follows:        Subscale: Score: Self-Perceived Impairment Level: RE: Status:   Physical 10/40 Mild mod IMPROVEMENT   Functional  Mild mod IMPROVEMENT   Emotional  Mild mod IMPROVEMENT          TOTAL 26/120 Mild mod IMPROVEMENT       PERCEPTUAL VOICE ASSESSMENT:    2  Consensus Auditory Perceptual Evaluation of Voice (CAPE-V): The CAPE-V rates auditory-perceptual qualities of a patients voice  The overall severity, roughness, breathiness, strain, pitch and loudness are rated during several tasks including general conversation, vowel prolongation, and reading of sentences  Patient also read the [place] to assess the coordination of respiration and voice production   The ratings of the abovementioned parameters were plotted on a 100-millimeter scale, with 0 corresponding to typical normal voice and 100 indicating a deviant voice  Parameter: Rating: Severity & Perceptual Observations:   *Overall Severity Roughness 5/100 Mild   Roughness 5/100 Mild   Breathiness 0/100 WFL   Strain 0/100 WFL   Pitch 0/100 WFL   Loudness 0/100 WFL   Focus of Resonance Min-WFL           RESPIRATORY EFFICIENCY:   3  S:Z Ratio Task: Patient was instructed to sustain the sounds /s/ and /z/ across three trials to examine the coordination and efficiency of respiration and voice production  Normative data suggests that adults can prolong these sounds for 20-25 seconds  Ratios of 1 4 and above are consistent with laryngeal inefficiency, and ratios of 2 0 and above are suggestive of vocal fold pathology  Task: Trial 1 (sec): Trial 2 (sec): Trial 3 (sec):   /s/ 18 58 16 19 14 5   /z/ 14 52 12 3 12 64     Best /s/: 15 58  Best /z/: 14 52      Ratio: 1 07 (RE: 1 24) --IMPROVEMENT      4  Maximum Phonation Time: Patient was instructed to sustain the sound /ah/ across three trials to measure respiratory and laryngeal coordination and efficiency  Adults are typically able to prolong these vowels sound for 15-20 seconds  Reduced MPT may suggest poor respiratory support, or poor medial glottal closure  Trial 1 (sec): Trial 2 (sec): Trial 3 (sec):   13 3 15 16 15 81     Average Duration: 14 75 seconds (RE: 13 81)--IMPROVEMENT          Goals      Short-term Goals:    1  Patient will increase MPT to >10-15 seconds in order to facilitate improved breath support for speech tasks, to be achieved in 4-6 weeks  --GOAL MET    2  Pt will utilize diaphragmatic breathing during oral sentence/paragraph reading in 80% of opportunities to facilitate increased breath support for voice/speaking, to be achieved in 4-6 weeks  --GOAL MET    3  Vocal quality during 1:1 conversation will improve with use of trained compensatory strategies with 80% accuracy, to be achieved in 4-6 weeks  --GOAL MET    4   Patient will demonstrate the use of trained relaxation exercises with 80% accuracy independently, to decrease upper body tension contributing to dysphonia, to be achieved in 4-6 weeks  -GOAL MET      Long-term Goals:    1  Patient will improve vocal quality by 80% as self-reported by patient for increased functional communication by discharge  --GOAL MET    2  Patient will independently utilize trained compensatory strategies with 90% accuracy independently at a conversational level allowing for improved vocal quality for communication by discharge  --GOAL MET      Impressions/Recommendations    Impressions:   Patient presents with [facility]- min voice difficulties at this time, characterized by occasionally reduced use of resonant voicing impacting vocal quality at this time however patient with 90% self monitoring accuracy and demonstrating independent use of trained strategies/recommendations allowing for grossly [facility] vocal quality with use of strategies  Patient reports reduced episodes of vocal fatigue, 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  Patient reports overall severity rating of mild on VHI this date (improvement from IE), noted 14 75 MPT (Improvement from IE), grossly WFL vocal quality  Patient is appropriate for discharge from skilled SLP interventions at this time as patient with all goals met at this time, independent in trained recommendations/strategies  Patient in agreement with discharge at this time  Recommendations:  -Patient to be discharged from outpatient skilled Speech Therapy services: Patient achieved all goals in plan of care  If patient would like to resume therapy in the future, a new prescription will be required   Patient to be discharged to an independent Home Exercise Program     -Frequency: 1x weekly  -Duration: 1 WEEK

## 2022-02-19 ENCOUNTER — TELEMEDICINE (OUTPATIENT)
Dept: FAMILY MEDICINE CLINIC | Facility: CLINIC | Age: 62
End: 2022-02-19
Payer: COMMERCIAL

## 2022-02-19 VITALS — HEIGHT: 63 IN | WEIGHT: 122.8 LBS | OXYGEN SATURATION: 97 % | BODY MASS INDEX: 21.76 KG/M2

## 2022-02-19 DIAGNOSIS — U07.1 COVID-19 VIRUS INFECTION: Primary | ICD-10-CM

## 2022-02-19 PROCEDURE — 3008F BODY MASS INDEX DOCD: CPT | Performed by: NURSE PRACTITIONER

## 2022-02-19 PROCEDURE — 99214 OFFICE O/P EST MOD 30 MIN: CPT | Performed by: NURSE PRACTITIONER

## 2022-02-19 RX ORDER — LORATADINE 10 MG/1
10 TABLET ORAL DAILY
Qty: 30 TABLET | Refills: 3 | Status: SHIPPED | OUTPATIENT
Start: 2022-02-19

## 2022-02-19 RX ORDER — PREDNISONE 10 MG/1
TABLET ORAL
Qty: 30 TABLET | Refills: 0 | Status: SHIPPED | OUTPATIENT
Start: 2022-02-19

## 2022-02-19 RX ORDER — ALBUTEROL SULFATE 90 UG/1
2 AEROSOL, METERED RESPIRATORY (INHALATION) EVERY 6 HOURS PRN
Qty: 8 G | Refills: 0 | Status: SHIPPED | OUTPATIENT
Start: 2022-02-19 | End: 2022-03-14

## 2022-02-19 RX ORDER — FLUTICASONE PROPIONATE 50 MCG
1 SPRAY, SUSPENSION (ML) NASAL DAILY
Qty: 11.1 ML | Refills: 3 | Status: SHIPPED | OUTPATIENT
Start: 2022-02-19

## 2022-02-19 RX ORDER — BROMPHENIRAMINE MALEATE, PSEUDOEPHEDRINE HYDROCHLORIDE, AND DEXTROMETHORPHAN HYDROBROMIDE 2; 30; 10 MG/5ML; MG/5ML; MG/5ML
5 SYRUP ORAL 4 TIMES DAILY PRN
Qty: 120 ML | Refills: 0 | Status: SHIPPED | OUTPATIENT
Start: 2022-02-19

## 2022-02-19 NOTE — PROGRESS NOTES
COVID-19 Outpatient Progress Note    Assessment/Plan:    Problem List Items Addressed This Visit     None         COVID-19 Plan   Encounter provider CHERRY De La Rosa    Provider located at [address] [place] [address]  [address] [place] [id number] [phone]    Recent Visits  No visits were found meeting these conditions  Showing recent visits within past 7 days and meeting all other requirements  Future Appointments  No visits were found meeting these conditions  Showing future appointments within next 150 days and meeting all other requirements     COVID-19 HPI  Lab Results   Component Value Date    SARSCORONAVI Detected (A) 02/15/2022     Past Medical History:   Diagnosis Date    Anxiety     Chronic sinusitis     last assessed 2013    Depression     Disease of thyroid gland     hypothyroidism    Grief reaction     last assessed 2017    Scabies     last assessed 3/27/2015    Sciatica     last assessed 2013    Vitamin D deficiency     Wears partial dentures     upper     Past Surgical History:   Procedure Laterality Date    BLADDER SUSPENSION       SECTION      COLONOSCOPY      NM REPAIR OF NASAL SEPTUM N/A 2019    Procedure: SEPTOPLASTY with Collumellar Strut Reconstruction;  Surgeon: Robbi Omer DO;  [facility];  Service: ENT    NM STEREOTACTIC COMP ASSIST PROC,CRANIAL,EXTRADURAL Left 2019    Procedure: FUNCTIONAL ENDOSCOPIC SINUS SURGERY (FESS) IMAGED GUIDED; Surgeon:  Robbi Omer DO;  [facility];  Service: ENT     Current Outpatient Medications   Medication Sig Dispense Refill    atorvastatin (LIPITOR) 10 mg tablet Take 1 tablet (10 mg total) by mouth daily 30 tablet 2    b complex vitamins capsule Take 1 capsule by mouth daily      buPROPion (WELLBUTRIN XL) 300 mg 24 hr tablet TAKE 1 TABLET BY MOUTH EVERY DAY IN THE MORNING 90 tablet 1    calcium carbonate (OS-GABRIELLE) 600 MG tablet Take 600 mg by mouth 2 (two) times a day with meals      cholecalciferol (VITAMIN D3) 1,000 units tablet Take 1,000 Units by mouth daily      levothyroxine 25 mcg tablet TAKE 1 TABLET BY MOUTH EVERY DAY 90 tablet 0    MAGNESIUM PO Take 1 tablet by mouth daily      naproxen sodium (ALEVE) 220 MG tablet Take 2 tablets (440 mg total) by mouth every 12 (twelve) hours as needed for mild pain      Omega-3 Fatty Acids (FISH OIL) 1,000 mg Take by mouth      phenazopyridine (PYRIDIUM) 100 mg tablet Take 1 tablet (100 mg total) by mouth 3 (three) times a day as needed for bladder spasms 10 tablet 0    Premarin vaginal cream APPLY 0 5 APPLICATOR EVERY NIGHT AT BEDTIME FOR 2 WKS AND THEN EVERY OTHER NIGHT AS NEEDED  (Patient not taking: Reported on 10/7/2021)      SUMAtriptan (IMITREX) 50 mg tablet Take 1 tablet (50 mg total) by mouth once as needed for migraine for up to 1 dose May repeat in 1 hour 9 tablet 0    vitamin B-12 (VITAMIN B-12) 1,000 mcg tablet Take by mouth       No current facility-administered medications for this visit  Allergies   Allergen Reactions    Bactrim [Sulfamethoxazole-Trimethoprim] Hives and Rash    Sulfa Antibiotics Hives and Rash    Pregabalin     Cefuroxime GI Intolerance    Ciprofloxacin Nausea Only    Fiorinal [Butalbital-Aspirin-Caffeine] Headache     Can take meloxican       Review of Systems  Objective: There were no vitals filed for this visit  Physical Exam    VIRTUAL VISIT DISCLAIMER    Roman Mcneal verbally agrees to participate in [employer]  Pt is aware that [employer] could be limited without vital signs or the ability to perform a full hands-on physical exam  Erica Saenz understands she or the provider may request at any time to terminate the video visit and request the patient to seek care or treatment in person

## 2022-02-19 NOTE — PROGRESS NOTES
COVID-19 Outpatient Progress Note    Assessment/Plan:    Problem List Items Addressed This Visit        Other    COVID-19 virus infection - Primary     19Feb: Day 6  Prednisone taper ordered to help with congestion and airway inflammation  Claritin and Flonase ordered to be taken daily to help with congestion  Phenergan DM ordered to be taken as needed for cough  Albuterol inhaler also ordered to be used as needed for shortness of breath  I will follow-up with patient again on 02/21/2022  Relevant Medications    albuterol (PROVENTIL HFA,VENTOLIN HFA) 90 mcg/act inhaler    predniSONE 10 mg tablet    brompheniramine-pseudoephedrine-DM 30-2-10 MG/5ML syrup    loratadine (CLARITIN) 10 mg tablet    fluticasone (FLONASE) 50 mcg/act nasal spray         Disposition:     I recommended continued isolation until at least 24 hours have passed since recovery defined as resolution of fever without the use of fever-reducing medications AND improvement in COVID symptoms AND 10 days have passed since onset of symptoms (or 10 days have passed since date of first positive viral diagnostic test for asymptomatic patients)  I have spent 15 minutes directly with the patient  Encounter provider CHERRY Kramer    Provider located at [address]  [id number] [place] [id number] [phone]    Recent Visits  No visits were found meeting these conditions  Showing recent visits within past 7 days and meeting all other requirements  Today's Visits  Date Type Provider Dept   02/19/22 Telemedicine Dr[facility]   Showing today's visits and meeting all other requirements  Future Appointments  No visits were found meeting these conditions  Showing future appointments within next 150 days and meeting all other requirements     This virtual check-in was done via [employer] and patient was informed that this is a secure, HIPAA-compliant platform  She agrees to proceed  Patient agrees to participate in a virtual check in via telephone or video visit instead of presenting to the office to address urgent/immediate medical needs  Patient is aware this is a billable service  After connecting through [facility], the patient was identified by name and date of birth  Vashti Morrison was informed that this was a telemedicine visit and that the exam was being conducted confidentially over secure lines  My office door was closed  No one else was in the room  Vashti Morrison acknowledged consent and understanding of privacy and security of the telemedicine visit  I informed the patient that I have reviewed her record in Epic and presented the opportunity for her to ask any questions regarding the visit today  The patient agreed to participate  Verification of patient location:  Patient is located in the following state in which I hold an active license: [place]    Subjective:   Vashti Morrison is a 64 y o  female who has been screened for COVID-19  Symptom change since last report: worsening  Patient's symptoms include chills, fatigue, malaise, nasal congestion, rhinorrhea, cough (productive ), shortness of breath (TORRES), chest tightness, myalgias and headache  Patient denies fever, sore throat, anosmia, loss of taste, abdominal pain, nausea, vomiting and diarrhea  - Date of symptom onset: 2/14/2022  - Date of positive COVID-19 test: 2/15/2022  Type of test: PCR  COVID-19 vaccination status: Fully vaccinated with booster    Norm eJn has been staying home and has isolated themselves in her home  She is taking care to not share personal items and is cleaning all surfaces that are touched often, like counters, tabletops, and doorknobs using household cleaning sprays or wipes  She is wearing a mask when she leaves her room       Patient is reporting symptoms of chills, intermittent productive cough, rhinorrhea, nasal congestion, headaches, myalgias, and increased fatigue  Patient is also reporting mild dyspnea on exertion  Patient reports that she has not been experiencing any shortness of breath with rest   Patient also reports she has been checking her oxygen saturation and it has remained around 97%  Patient currently denies any fevers  Since patient is still experiencing some shortness of breath she was advised to continue to quarantine even though she has been having symptoms for over 5 days at this point  Lab Results   Component Value Date    SARSCORONAVI Detected (A) 02/15/2022     Past Medical History:   Diagnosis Date    Anxiety     Chronic sinusitis     last assessed 2013    Depression     Disease of thyroid gland     hypothyroidism    Grief reaction     last assessed 2017    Scabies     last assessed 3/27/2015    Sciatica     last assessed 2013    Vitamin D deficiency     Wears partial dentures     upper     Past Surgical History:   Procedure Laterality Date    BLADDER SUSPENSION       SECTION      COLONOSCOPY      CA REPAIR OF NASAL SEPTUM N/A 2019    Procedure: SEPTOPLASTY with Collumellar Strut Reconstruction;  Surgeon: Mikey Knox DO;  [facility];  Service: ENT    CA STEREOTACTIC COMP ASSIST PROC,CRANIAL,EXTRADURAL Left 2019    Procedure: FUNCTIONAL ENDOSCOPIC SINUS SURGERY (FESS) IMAGED GUIDED; Surgeon:  Mikey Knox DO;  [facility];  Service: ENT     Current Outpatient Medications   Medication Sig Dispense Refill    atorvastatin (LIPITOR) 10 mg tablet Take 1 tablet (10 mg total) by mouth daily 30 tablet 2    b complex vitamins capsule Take 1 capsule by mouth daily      buPROPion (WELLBUTRIN XL) 300 mg 24 hr tablet TAKE 1 TABLET BY MOUTH EVERY DAY IN THE MORNING 90 tablet 1    calcium carbonate (OS-GABRIELLE) 600 MG tablet Take 600 mg by mouth 2 (two) times a day with meals      cholecalciferol (VITAMIN D3) 1,000 units tablet Take 1,000 Units by mouth daily      levothyroxine 25 mcg tablet TAKE 1 TABLET BY MOUTH EVERY DAY 90 tablet 0    MAGNESIUM PO Take 1 tablet by mouth daily      naproxen sodium (ALEVE) 220 MG tablet Take 2 tablets (440 mg total) by mouth every 12 (twelve) hours as needed for mild pain      Omega-3 Fatty Acids (FISH OIL) 1,000 mg Take by mouth      phenazopyridine (PYRIDIUM) 100 mg tablet Take 1 tablet (100 mg total) by mouth 3 (three) times a day as needed for bladder spasms 10 tablet 0    Premarin vaginal cream APPLY 0 5 APPLICATOR EVERY NIGHT AT BEDTIME FOR 2 WKS AND THEN EVERY OTHER NIGHT AS NEEDED   SUMAtriptan (IMITREX) 50 mg tablet Take 1 tablet (50 mg total) by mouth once as needed for migraine for up to 1 dose May repeat in 1 hour 9 tablet 0    vitamin B-12 (VITAMIN B-12) 1,000 mcg tablet Take by mouth      albuterol (PROVENTIL HFA,VENTOLIN HFA) 90 mcg/act inhaler Inhale 2 puffs every 6 (six) hours as needed for wheezing or shortness of breath 8 g 0    brompheniramine-pseudoephedrine-DM 30-2-10 MG/5ML syrup Take 5 mL by mouth 4 (four) times a day as needed for congestion or cough 120 mL 0    fluticasone (FLONASE) 50 mcg/act nasal spray 1 spray into each nostril daily 11 1 mL 3    loratadine (CLARITIN) 10 mg tablet Take 1 tablet (10 mg total) by mouth daily 30 tablet 3    predniSONE 10 mg tablet Use 5 tablets for 2 days  Use 4 tablets for 2 days  Use 3 tablets for 2 days  Use 2 tablets for 2 days  Use 1 tablet for 2 days  30 tablet 0     No current facility-administered medications for this visit  Allergies   Allergen Reactions    Bactrim [Sulfamethoxazole-Trimethoprim] Hives and Rash    Sulfa Antibiotics Hives and Rash    Pregabalin     Cefuroxime GI Intolerance    Ciprofloxacin Nausea Only    Fiorinal [Butalbital-Aspirin-Caffeine] Headache     Can take meloxican       Review of Systems   Constitutional: Positive for chills and fatigue  Negative for fever  HENT: Positive for congestion and rhinorrhea  Negative for sore throat  Eyes: Negative  Respiratory: Positive for cough (productive ), chest tightness and shortness of breath (TORRES)  Cardiovascular: Negative  Gastrointestinal: Negative for abdominal pain, diarrhea, nausea and vomiting  Endocrine: Negative  Genitourinary: Negative  Musculoskeletal: Positive for myalgias  Skin: Negative  Allergic/Immunologic: Negative  Neurological: Positive for headaches  Hematological: Negative  Psychiatric/Behavioral: Negative  Objective:    Vitals:    02/19/22 0734   SpO2: 97%   Weight: 55 7 kg (122 lb 12 8 oz)   Height: 5' 3" (1 6 m)       Physical Exam  Vitals reviewed: limited due to [employer] exam    Constitutional:       General: She is not in acute distress  Appearance: Normal appearance  She is not ill-appearing  Neurological:      Mental Status: She is alert  VIRTUAL VISIT DISCLAIMER    Eun Lora verbally agrees to participate in [employer]  Pt is aware that [employer] could be limited without vital signs or the ability to perform a full hands-on physical exam  Erica Saenz understands she or the provider may request at any time to terminate the video visit and request the patient to seek care or treatment in person

## 2022-02-19 NOTE — ASSESSMENT & PLAN NOTE
19Feb: Day 6  Prednisone taper ordered to help with congestion and airway inflammation  Claritin and Flonase ordered to be taken daily to help with congestion  Phenergan DM ordered to be taken as needed for cough  Albuterol inhaler also ordered to be used as needed for shortness of breath  I will follow-up with patient again on 02/21/2022

## 2022-02-19 NOTE — PATIENT INSTRUCTIONS
Problem List Items Addressed This Visit        Other    COVID-19 virus infection - Primary     19Feb: Day 10  Prednisone taper ordered to help with congestion and airway inflammation  Claritin and Flonase ordered to be taken daily to help with congestion  Phenergan DM ordered to be taken as needed for cough  Albuterol inhaler also ordered to be used as needed for shortness of breath  I will follow-up with patient again on 02/21/2022  Relevant Medications    albuterol (PROVENTIL HFA,VENTOLIN HFA) 90 mcg/act inhaler    predniSONE 10 mg tablet    brompheniramine-pseudoephedrine-DM 30-2-10 MG/5ML syrup    loratadine (CLARITIN) 10 mg tablet    fluticasone (FLONASE) 50 mcg/act nasal spray        COVID-19 Home Care Guidelines    Your healthcare provider and/or public health staff have evaluated you and have determined that you do not need to remain in the hospital at this time  At this time you can be isolated at home where you will be monitored by staff from your local or state health department  You should carefully follow the prevention and isolation steps below until a healthcare provider or local or state health department says that you can return to your normal activities  Stay home except to get medical care    People who are mildly ill with COVID-19 are able to isolate at home during their illness  You should restrict activities outside your home, except for getting medical care  Do not go to work, school, or public areas  Avoid using public transportation, ride-sharing, or taxis  Separate yourself from other people and animals in your home    People: As much as possible, you should stay in a specific room and away from other people in your home  Also, you should use a separate bathroom, if available  Animals: You should restrict contact with pets and other animals while you are sick with COVID-19, just like you would around other people   Although there have not been reports of pets or other animals becoming sick with COVID-19, it is still recommended that people sick with COVID-19 limit contact with animals until more information is known about the virus  When possible, have another member of your household care for your animals while you are sick  If you are sick with COVID-19, avoid contact with your pet, including petting, snuggling, being kissed or licked, and sharing food  If you must care for your pet or be around animals while you are sick, wash your hands before and after you interact with pets and wear a facemask  See COVID-19 and Animals for more information  Call ahead before visiting your doctor    If you have a medical appointment, call the healthcare provider and tell them that you have or may have COVID-19  This will help the healthcare providers office take steps to keep other people from getting infected or exposed  Wear a facemask    You should wear a facemask when you are around other people (e g , sharing a room or vehicle) or pets and before you enter a healthcare providers office  If you are not able to wear a facemask (for example, because it causes trouble breathing), then people who live with you should not stay in the same room with you, or they should wear a facemask if they enter your room  Cover your coughs and sneezes    Cover your mouth and nose with a tissue when you cough or sneeze  Throw used tissues in a lined trash can  Immediately wash your hands with soap and water for at least 20 seconds or, if soap and water are not available, clean your hands with an alcohol-based hand  that contains at least 60% alcohol  Clean your hands often    Wash your hands often with soap and water for at least 20 seconds, especially after blowing your nose, coughing, or sneezing; going to the bathroom; and before eating or preparing food   If soap and water are not readily available, use an alcohol-based hand  with at least 60% alcohol, covering all surfaces of your hands and rubbing them together until they feel dry  Soap and water are the best option if hands are visibly dirty  Avoid touching your eyes, nose, and mouth with unwashed hands  Avoid sharing personal household items    You should not share dishes, drinking glasses, cups, eating utensils, towels, or bedding with other people or pets in your home  After using these items, they should be washed thoroughly with soap and water  Clean all high-touch surfaces everyday    High touch surfaces include counters, tabletops, doorknobs, bathroom fixtures, toilets, phones, keyboards, tablets, and bedside tables  Also, clean any surfaces that may have blood, stool, or body fluids on them  Use a household cleaning spray or wipe, according to the label instructions  Labels contain instructions for safe and effective use of the cleaning product including precautions you should take when applying the product, such as wearing gloves and making sure you have good ventilation during use of the product  Monitor your symptoms    Seek prompt medical attention if your illness is worsening (e g , difficulty breathing)  Before seeking care, call your healthcare provider and tell them that you have, or are being evaluated for, COVID-19  Put on a facemask before you enter the facility  These steps will help the healthcare providers office to keep other people in the office or waiting room from getting infected or exposed  Ask your healthcare provider to call the local or state health department  Persons who are placed under active monitoring or facilitated self-monitoring should follow instructions provided by their local health department or occupational health professionals, as appropriate  If you have a medical emergency and need to call 911, notify the dispatch personnel that you have, or are being evaluated for COVID-19  If possible, put on a facemask before emergency medical services arrive      Discontinuing home isolation    Patients with confirmed COVID-19 should remain under home isolation precautions until the following conditions are met:   - They have had no fever for at least 24 hours (that is one full day of no fever without the use medicine that reduces fevers)  AND  - other symptoms have improved (for example, when their cough or shortness of breath have improved)  AND  - If had mild or moderate illness, at least 10 days have passed since their symptoms first appeared or if severe illness (needed oxygen) or immunosuppressed, at least 20 days have passed since symptoms first appeared  Patients with confirmed COVID-19 should also notify close contacts (including their workplace) and ask that they self-quarantine  Currently, close contact is defined as being within 6 feet for 15 minutes or more from the period 24 hours starting 48 hours before symptom onset to the time at which the patient went into isolation  Close contacts of patients diagnosed with COVID-19 should be instructed by the patient to self-quarantine for 14 days from the last time of their last contact with the patient  Source: [employer]    COVID TESTING SITES    [facility] is offering drive through testing for COVID  These are the preferred testing sites  Wait times should be minimal     Beginning 1/24/2022:    [address]: M-F 3-7; LAST DAY: [id number]    The sites and times are subject to change  For the most Up to date locations and times, please visit this website:    [email or web address]      If you are not able to get to one of the drive through sites, you can go to [facility] if you have an order  There can be a long wait time for this service  When arriving at [facility]:  Call the number [facility], and they will instruct you as to what to do  Below is the link to [facility] locations    Walk-In Locations - Skip The Wait  [facility]  [address] ([facility] org)   [place] at      [facility]  [address], [address]  [place], [address]  [phone]  Mon  - Fri  7:30 am to 10:30 pm  Sat  - Sun  8 am to 8 pm    [address], [place] [address]  [phone]  Mon  - Fri  7:30 am to 10:30 pm  Sat  - Sun  8 am to 8 pm    [address] [facility]  [address] [facility], [address]  [place], [address]  [address] [facility]  8 am to 8 pm  Sat  - Sun  8 am to 4 pm    [address]  [address] , [address]  [phone]  Mon  - Fri  8 am to 4 pm  Sat  - Sun  8 am to 4 pm    [address] [facility], [address] [facility] [address]  [phone]  Mon  - Fri  8 am to 8 pm  Sat  - Sun  8 am to 4 pm    [address]  [address], [address]  [address] Fri  8 am to 8 pm  Sat  - Sun  8 am to 4 pm    [address] [facility], [address] [facility]  [phone]  Mon  - Fri  8 am to 4 pm    [address]  [address] [facility], [address]  [phone]  Mon  - Fri  8 am to 8 pm  Sat  - Sun  8 am to 8 pm    [address]  [address], [address]  [phone]  Mon  - Fri  8 am to 8 pm    [facility]  [address] [place] [address], [address] [facility]  Mon -Fri  8 am to 8 pm  Sat - Sun  8 am to 4 pm    [address] [facility] [address] [facility] [address], [address]  [address] Fri  8 am to 8 pm  Sat  - Sun  8 am to 4 pm    [address] [place] [address]  [address], [address]  [phone]  Mon  - Fri  8 am to 4 pm    [address] [facility]  [address]  [employer]    [address], [address]  [phone]  Mon  - Fri  8 am to 8 pm  Sat  - Sun  8 am to 8 pm    [address]  [facility]  [address]  [place], [address]  [phone]  Mon  - Fri  8 am to 8 pm  Sat  - Sun  8 am to 8 pm    [address]   [address], [address], [address]  [phone]  Mon  - Fri  7:30 am to 10:30 pm  Sat  - Sun  8 am to 8 pm    [address]  [address], [place], [address]  Mon -Fri  8 am to 6 pm    [address] [facility]  [address], [address]  [phone]  Mon  - Fri  8 am to 8 pm    [address]  [address] [facility], [address] [place] [address],[address] 2  [address]  [phone]  Mon  - Fri  8 am to 8 pm  Sat  - Sun  8 am to 4 pm

## 2022-02-21 ENCOUNTER — TELEMEDICINE (OUTPATIENT)
Dept: FAMILY MEDICINE CLINIC | Facility: CLINIC | Age: 62
End: 2022-02-21
Payer: COMMERCIAL

## 2022-02-21 DIAGNOSIS — U07.1 COVID-19 VIRUS INFECTION: Primary | ICD-10-CM

## 2022-02-21 DIAGNOSIS — J30.9 ALLERGIC RHINITIS, UNSPECIFIED SEASONALITY, UNSPECIFIED TRIGGER: ICD-10-CM

## 2022-02-21 PROCEDURE — 99214 OFFICE O/P EST MOD 30 MIN: CPT | Performed by: NURSE PRACTITIONER

## 2022-02-21 PROCEDURE — 1036F TOBACCO NON-USER: CPT | Performed by: NURSE PRACTITIONER

## 2022-02-21 NOTE — PROGRESS NOTES
COVID-19 Outpatient Progress Note    Assessment/Plan:    Problem List Items Addressed This Visit        Respiratory    Allergic rhinitis     Patient was advised to continue Claritin and Flonase as directed  Other    COVID-19 virus infection - Primary     21Feb: Day 8  Patient was released to return from isolation  Patient was advised to continue prednisone taper until finished  No further follow-up is necessary at this point  Disposition:     Patient has COVID-19 infection  Based off CDC guidelines, they were recommended to isolate for 5 days from the date of the positive test  If they remain asymptomatic, isolation may be ended followed by 5 days of wearing a mask when around othes to minimize risk of infecting others  If they have a fever, continue to stay home until fever resolves for at least 24 hours  I have spent 15 minutes directly with the patient  Encounter provider CHERRY Baer    Provider located at [address] [address] [phone]    Recent Visits  Date Type Provider Dept   02/19/22 Telemedicine [facility]   Showing recent visits within past 7 days and meeting all other requirements  Today's Visits  Date Type Provider Dept   02/21/22 Telemedicine [facility]   Showing today's visits and meeting all other requirements  Future Appointments  No visits were found meeting these conditions  Showing future appointments within next 150 days and meeting all other requirements     This virtual check-in was done via [employer] and patient was informed that this is a secure, HIPAA-compliant platform  She agrees to proceed  Patient agrees to participate in a virtual check in via telephone or video visit instead of presenting to the office to address urgent/immediate medical needs  Patient is aware this is a billable service      After connecting through [facility], the patient was identified by name and date of birth  Davida Winters was informed that this was a telemedicine visit and that the exam was being conducted confidentially over secure lines  My office door was closed  No one else was in the room  Davida Winters acknowledged consent and understanding of privacy and security of the telemedicine visit  I informed the patient that I have reviewed her record in Epic and presented the opportunity for her to ask any questions regarding the visit today  The patient agreed to participate  Verification of patient location:  Patient is located in the following state in which I hold an active license: [place]    Subjective:   Davida Winters is a 64 y o  female who has been screened for COVID-19  Symptom change since last report: improving  Patient's symptoms include fatigue, malaise, nasal congestion, rhinorrhea, cough (non-productive ) and headache  Patient denies fever, chills, sore throat, anosmia, loss of taste, shortness of breath, chest tightness, abdominal pain, nausea, vomiting, diarrhea and myalgias  - Date of symptom onset: 2/14/2022  - Date of positive COVID-19 test: 2/15/2022  Type of test: PCR  COVID-19 vaccination status: Fully vaccinated with booster    Nasra Monique has been staying home and has isolated themselves in her home  She is taking care to not share personal items and is cleaning all surfaces that are touched often, like counters, tabletops, and doorknobs using household cleaning sprays or wipes  She is wearing a mask when she leaves her room  Patient is reporting symptoms of intermittent nonproductive cough, rhinorrhea, nasal congestion, headaches, and increased fatigue  Patient reports that her shortness of breath has resolved since beginning prednisone taper and her congestion has also improved  She reports she has not needed to use the albuterol inhaler at all since it was prescribed  Patient denies any fevers  Since it has been 8 days since the patient's symptoms 1st began she has been cleared to return from isolation  Patient was advised to continue to mask while in public over the next 3 days  Lab Results   Component Value Date    PRITESH Detected (A) 02/15/2022     Past Medical History:   Diagnosis Date    Anxiety     Chronic sinusitis     last assessed 2013    Depression     Disease of thyroid gland     hypothyroidism    Grief reaction     last assessed 2017    Scabies     last assessed 3/27/2015    Sciatica     last assessed 2013    Vitamin D deficiency     Wears partial dentures     upper     Past Surgical History:   Procedure Laterality Date    BLADDER SUSPENSION       SECTION      COLONOSCOPY      MI REPAIR OF NASAL SEPTUM N/A 2019    Procedure: SEPTOPLASTY with Collumellar Strut Reconstruction;  Surgeon: Mairanne Adorno DO;  [facility];  Service: ENT    MI STEREOTACTIC COMP ASSIST PROC,CRANIAL,EXTRADURAL Left 2019    Procedure: FUNCTIONAL ENDOSCOPIC SINUS SURGERY (FESS) IMAGED GUIDED; Surgeon:  Marianne Adorno DO;  [facility];  Service: ENT     Current Outpatient Medications   Medication Sig Dispense Refill    albuterol (PROVENTIL HFA,VENTOLIN HFA) 90 mcg/act inhaler Inhale 2 puffs every 6 (six) hours as needed for wheezing or shortness of breath 8 g 0    atorvastatin (LIPITOR) 10 mg tablet Take 1 tablet (10 mg total) by mouth daily 30 tablet 2    b complex vitamins capsule Take 1 capsule by mouth daily      brompheniramine-pseudoephedrine-DM 30-2-10 MG/5ML syrup Take 5 mL by mouth 4 (four) times a day as needed for congestion or cough 120 mL 0    buPROPion (WELLBUTRIN XL) 300 mg 24 hr tablet TAKE 1 TABLET BY MOUTH EVERY DAY IN THE MORNING 90 tablet 1    calcium carbonate (OS-GABRIELLE) 600 MG tablet Take 600 mg by mouth 2 (two) times a day with meals      cholecalciferol (VITAMIN D3) 1,000 units tablet Take 1,000 Units by mouth daily  fluticasone (FLONASE) 50 mcg/act nasal spray 1 spray into each nostril daily 11 1 mL 3    levothyroxine 25 mcg tablet TAKE 1 TABLET BY MOUTH EVERY DAY 90 tablet 0    loratadine (CLARITIN) 10 mg tablet Take 1 tablet (10 mg total) by mouth daily 30 tablet 3    MAGNESIUM PO Take 1 tablet by mouth daily      naproxen sodium (ALEVE) 220 MG tablet Take 2 tablets (440 mg total) by mouth every 12 (twelve) hours as needed for mild pain      Omega-3 Fatty Acids (FISH OIL) 1,000 mg Take by mouth      phenazopyridine (PYRIDIUM) 100 mg tablet Take 1 tablet (100 mg total) by mouth 3 (three) times a day as needed for bladder spasms 10 tablet 0    predniSONE 10 mg tablet Use 5 tablets for 2 days  Use 4 tablets for 2 days  Use 3 tablets for 2 days  Use 2 tablets for 2 days  Use 1 tablet for 2 days  30 tablet 0    Premarin vaginal cream APPLY 0 5 APPLICATOR EVERY NIGHT AT BEDTIME FOR 2 WKS AND THEN EVERY OTHER NIGHT AS NEEDED   SUMAtriptan (IMITREX) 50 mg tablet Take 1 tablet (50 mg total) by mouth once as needed for migraine for up to 1 dose May repeat in 1 hour 9 tablet 0    vitamin B-12 (VITAMIN B-12) 1,000 mcg tablet Take by mouth       No current facility-administered medications for this visit  Allergies   Allergen Reactions    Bactrim [Sulfamethoxazole-Trimethoprim] Hives and Rash    Sulfa Antibiotics Hives and Rash    Pregabalin     Cefuroxime GI Intolerance    Ciprofloxacin Nausea Only    Fiorinal [Butalbital-Aspirin-Caffeine] Headache     Can take meloxican       Review of Systems   Constitutional: Positive for fatigue  Negative for chills and fever  HENT: Positive for congestion and rhinorrhea  Negative for sore throat  Eyes: Negative  Respiratory: Positive for cough (non-productive )  Negative for chest tightness and shortness of breath  Cardiovascular: Negative  Gastrointestinal: Negative for abdominal pain, diarrhea, nausea and vomiting  Endocrine: Negative      Genitourinary: Negative  Musculoskeletal: Negative for myalgias  Skin: Negative  Allergic/Immunologic: Negative  Neurological: Positive for headaches  Hematological: Negative  Psychiatric/Behavioral: Negative  Objective: There were no vitals filed for this visit  Physical Exam  Vitals reviewed: limited due to [employer] exam    Constitutional:       General: She is not in acute distress  Appearance: Normal appearance  She is not ill-appearing  Neurological:      Mental Status: She is alert  VIRTUAL VISIT DISCLAIMER    Martir Ramon verbally agrees to participate in [employer]  Pt is aware that [employer] could be limited without vital signs or the ability to perform a full hands-on physical exam  Erica Saenz understands she or the provider may request at any time to terminate the video visit and request the patient to seek care or treatment in person

## 2022-02-21 NOTE — PATIENT INSTRUCTIONS
Problem List Items Addressed This Visit        Respiratory    Allergic rhinitis     Patient was advised to continue Claritin and Flonase as directed  Other    COVID-19 virus infection - Primary     21Feb: Day 8  Patient was released to return from isolation  Patient was advised to continue prednisone taper until finished  No further follow-up is necessary at this point  [address]    Your healthcare provider and/or public health staff have evaluated you and have determined that you do not need to remain in the hospital at this time  At this time you can be isolated at home where you will be monitored by staff from your local or state health department  You should carefully follow the prevention and isolation steps below until a healthcare provider or local or state health department says that you can return to your normal activities  Stay home except to get medical care    People who are mildly ill with COVID-19 are able to isolate at home during their illness  You should restrict activities outside your home, except for getting medical care  Do not go to work, school, or public areas  Avoid using public transportation, ride-sharing, or taxis  Separate yourself from other people and animals in your home    People: As much as possible, you should stay in a specific room and away from other people in your home  Also, you should use a separate bathroom, if available  Animals: You should restrict contact with pets and other animals while you are sick with COVID-19, just like you would around other people  Although there have not been reports of pets or other animals becoming sick with COVID-19, it is still recommended that people sick with COVID-19 limit contact with animals until more information is known about the virus  When possible, have another member of your household care for your animals while you are sick   If you are sick with COVID-19, avoid contact with your pet, including petting, snuggling, being kissed or licked, and sharing food  If you must care for your pet or be around animals while you are sick, wash your hands before and after you interact with pets and wear a facemask  See COVID-19 and Animals for more information  Call ahead before visiting your doctor    If you have a medical appointment, call the healthcare provider and tell them that you have or may have COVID-19  This will help the healthcare providers office take steps to keep other people from getting infected or exposed  Wear a facemask    You should wear a facemask when you are around other people (e g , sharing a room or vehicle) or pets and before you enter a healthcare providers office  If you are not able to wear a facemask (for example, because it causes trouble breathing), then people who live with you should not stay in the same room with you, or they should wear a facemask if they enter your room  Cover your coughs and sneezes    Cover your mouth and nose with a tissue when you cough or sneeze  Throw used tissues in a lined trash can  Immediately wash your hands with soap and water for at least 20 seconds or, if soap and water are not available, clean your hands with an alcohol-based hand  that contains at least 60% alcohol  Clean your hands often    Wash your hands often with soap and water for at least 20 seconds, especially after blowing your nose, coughing, or sneezing; going to the bathroom; and before eating or preparing food  If soap and water are not readily available, use an alcohol-based hand  with at least 60% alcohol, covering all surfaces of your hands and rubbing them together until they feel dry  Soap and water are the best option if hands are visibly dirty  Avoid touching your eyes, nose, and mouth with unwashed hands      Avoid sharing personal household items    You should not share dishes, drinking glasses, cups, eating utensils, towels, or bedding with other people or pets in your home  After using these items, they should be washed thoroughly with soap and water  Clean all high-touch surfaces everyday    High touch surfaces include counters, tabletops, doorknobs, bathroom fixtures, toilets, phones, keyboards, tablets, and bedside tables  Also, clean any surfaces that may have blood, stool, or body fluids on them  Use a household cleaning spray or wipe, according to the label instructions  Labels contain instructions for safe and effective use of the cleaning product including precautions you should take when applying the product, such as wearing gloves and making sure you have good ventilation during use of the product  Monitor your symptoms    Seek prompt medical attention if your illness is worsening (e g , difficulty breathing)  Before seeking care, call your healthcare provider and tell them that you have, or are being evaluated for, COVID-19  Put on a facemask before you enter the facility  These steps will help the healthcare providers office to keep other people in the office or waiting room from getting infected or exposed  Ask your healthcare provider to call the local or [facility] health department  Persons who are placed under active monitoring or facilitated self-monitoring should follow instructions provided by their local health department or occupational health professionals, as appropriate  If you have a medical emergency and need to call 911, notify the dispatch personnel that you have, or are being evaluated for COVID-19  If possible, put on a facemask before emergency medical services arrive      Discontinuing home isolation    Patients with confirmed COVID-19 should remain under home isolation precautions until the following conditions are met:   - They have had no fever for at least 24 hours (that is one full day of no fever without the use medicine that reduces fevers)  AND  - other symptoms have improved (for example, when their cough or shortness of breath have improved)  AND  - If had mild or moderate illness, at least 10 days have passed since their symptoms first appeared or if severe illness (needed oxygen) or immunosuppressed, at least 20 days have passed since symptoms first appeared  Patients with confirmed COVID-19 should also notify close contacts (including their workplace) and ask that they self-quarantine  Currently, close contact is defined as being within 6 feet for 15 minutes or more from the period 24 hours starting 48 hours before symptom onset to the time at which the patient went into isolation  Close contacts of patients diagnosed with COVID-19 should be instructed by the patient to self-quarantine for 14 days from the last time of their last contact with the patient       Source: [employer]

## 2022-02-21 NOTE — ASSESSMENT & PLAN NOTE
21Feb: Day 8  Patient was released to return from isolation  Patient was advised to continue prednisone taper until finished  No further follow-up is necessary at this point

## 2022-02-28 ENCOUNTER — TELEPHONE (OUTPATIENT)
Dept: FAMILY MEDICINE CLINIC | Facility: CLINIC | Age: 62
End: 2022-02-28

## 2022-02-28 NOTE — TELEPHONE ENCOUNTER
Some bloating can be seen while taking prednisone however, tender breasts are not commonly reported  How long have the symptoms been occurring?

## 2022-02-28 NOTE — TELEPHONE ENCOUNTER
PT SAW KRISTIAN BY VIDEO VISIT LAST WEEK AND WAS PUT ON PREDNISONE AND IS NOW FINISHED WITH IT  IN THE MEANTIME SHE HAS PAINFUL ABDOMEN AND BREASTS - ABDOMEN IS BLOATED  SHE BELIEVES THIS IS FROM THE PREDNISONE - SIDE EFFECTS  PLEASE CALL HER BACK REGARDING THIS  THANK YOU

## 2022-03-01 NOTE — TELEPHONE ENCOUNTER
Pt states she has had breast tenderness as well as abdominal tenderness for 4 days  She states her last day on prednisone was 2/26

## 2022-03-13 DIAGNOSIS — U07.1 COVID-19 VIRUS INFECTION: ICD-10-CM

## 2022-03-14 RX ORDER — ALBUTEROL SULFATE 90 UG/1
AEROSOL, METERED RESPIRATORY (INHALATION)
Qty: 8 G | Refills: 0 | Status: SHIPPED | OUTPATIENT
Start: 2022-03-14

## 2022-03-14 NOTE — TELEPHONE ENCOUNTER
Requested Prescriptions     Pending Prescriptions Disp Refills    albuterol (PROVENTIL HFA,VENTOLIN HFA) 90 mcg/act inhaler [Pharmacy Med Name: ALBUTEROL HFA (PROAIR) INHALER]       Sig: TAKE 2 PUFFS BY MOUTH EVERY 6 HOURS AS NEEDED FOR WHEEZE OR FOR SHORTNESS OF BREATH     LOV 1/26/22 with DL, F/U non scheduled, labs completed

## 2022-04-11 DIAGNOSIS — E03.9 ACQUIRED HYPOTHYROIDISM: ICD-10-CM

## 2022-04-11 RX ORDER — LEVOTHYROXINE SODIUM 0.03 MG/1
TABLET ORAL
Qty: 90 TABLET | Refills: 0 | Status: SHIPPED | OUTPATIENT
Start: 2022-04-11

## 2022-04-11 NOTE — TELEPHONE ENCOUNTER
Requested Prescriptions     Pending Prescriptions Disp Refills    levothyroxine 25 mcg tablet [Pharmacy Med Name: LEVOTHYROXINE 25 MCG TABLET] 90 tablet 0     Sig: TAKE 1 TABLET BY MOUTH EVERY DAY       LOV 1/26/22 with DL, F/U 7/21/22 with MS, Labs completed

## 2022-06-10 ENCOUNTER — TELEPHONE (OUTPATIENT)
Dept: FAMILY MEDICINE CLINIC | Facility: CLINIC | Age: 62
End: 2022-06-10

## 2022-06-10 NOTE — TELEPHONE ENCOUNTER
On 6/4/2022 patient established care with Khadar Miller MD    [address] [facility]   [phone] (Fax)

## 2022-06-27 NOTE — TELEPHONE ENCOUNTER
06/27/22 2:49 PM        Thank you for your request  Your request has been received, reviewed, and the patient chart updated  The PCP has successfully been removed with a patient attribution note  This message will now be completed          Thank you  Devin Marley

## 2022-10-12 PROBLEM — N30.00 ACUTE CYSTITIS WITHOUT HEMATURIA: Status: RESOLVED | Noted: 2022-01-26 | Resolved: 2022-10-12

## 2023-10-18 ENCOUNTER — TRANSCRIBE ORDERS (OUTPATIENT)
Dept: GASTROENTEROLOGY | Facility: CLINIC | Age: 63
End: 2023-10-18

## 2024-04-05 ENCOUNTER — OFFICE VISIT (OUTPATIENT)
Dept: CARDIOLOGY CLINIC | Facility: CLINIC | Age: 64
End: 2024-04-05
Payer: COMMERCIAL

## 2024-04-05 VITALS
SYSTOLIC BLOOD PRESSURE: 120 MMHG | DIASTOLIC BLOOD PRESSURE: 80 MMHG | WEIGHT: 128 LBS | OXYGEN SATURATION: 97 % | BODY MASS INDEX: 22.67 KG/M2 | HEART RATE: 65 BPM

## 2024-04-05 DIAGNOSIS — E78.2 MIXED HYPERLIPIDEMIA: ICD-10-CM

## 2024-04-05 DIAGNOSIS — R07.9 CHEST PAIN, UNSPECIFIED TYPE: Primary | ICD-10-CM

## 2024-04-05 PROCEDURE — 93000 ELECTROCARDIOGRAM COMPLETE: CPT | Performed by: INTERNAL MEDICINE

## 2024-04-05 PROCEDURE — 99203 OFFICE O/P NEW LOW 30 MIN: CPT | Performed by: INTERNAL MEDICINE

## 2024-04-05 NOTE — PROGRESS NOTES
Cardiology Follow Up    Erica Saenz  1960  [id number]  [facility] CARDIAC CATH LAB  [address] [address]  [phone]    1. Chest pain, unspecified type        2. Mixed hyperlipidemia            Interval History: Cardiology consultation.  Multiple records reviewed, imaging was reviewed when available.  63-year-old female who has no previous cardiac history.  The patient complains of intermittent episode of chest discomfort, left-sided without radiation, moderate in intensity, sometimes more intense.  Variable duration.  No particular triggering factors.  This has been going on for close to 2 years, perhaps some crescendo pattern.  She has had multiple cardiac testing.  Exercise stress test echocardiogram in 2022, she did 8 minutes on the Buzz protocol, achieving target rate, she did experience chest pain with exercise, EKG and echocardiogram portion did not reveal or suggest ischemia.  She wanted to have a CT of the coronary in 2023, revealing right dominant system.  No obstructive coronary disease was noted.  Left atrial appendage was noted to be free of thrombus.  The patient does have dyslipidemia.  Most recent lipid profile 20 24th August 2 128, triglycerides of 236, HDL 36, LDL of 145.  Previous LDL as high as 184.  Most recent LFTs were normal.  She was prescribed statin therapy in the past, currently not taking.  She is unwilling to take it, patient is non-smoker, patient is scheduled for cholecystectomy in the setting of cholelithiasis.  MRI of the brain last year revealed mild microangiopathy.  Patient is able to exercise but she complains of significant fatigue as well.  There is family history of premature coronary disease, her father had myocardial infarction at early age.  Patient was interviewed in E[place].    Patient Active Problem List   Diagnosis    Allergic rhinitis    Anxiety    Depression with  anxiety    Eustachian tube dysfunction    CIERA (generalized anxiety disorder)    Hiatal hernia    Hypothyroidism    Ganglion, joint    Vitamin D deficiency    Abnormal finding in urine    Knee pain    Chest pain    Nausea    Carpal tunnel syndrome    Chondromalacia patellae    Synovial cyst of popliteal space    Pain in joint, multiple sites    Dizzy spells    Frequent headaches    Loss of balance    Hyperlipidemia    Worsening headaches    Myofascial pain on left side    Chronic sinusitis    Bilateral sciatica    Sciatica    Deviated septum    Deviated nasal septum    Menopausal hot flushes    Osteoarthritis    Bereavement reaction    Defibrination syndrome (HCC)    Cyst of skin    Skin lesion    Depression    Herpes zoster without complication    Axillary adenopathy    Breast pain    Elevated LFTs    Dysuria    Lyme disease    Abnormality of gait due to impairment of balance    White matter disease    Female bladder prolapse    Atrophic vaginitis    Abnormal mammogram    COVID-19 virus infection     Past Medical History:   Diagnosis Date    Anxiety     Chronic sinusitis     last assessed 6/5/2013    Depression     Disease of thyroid gland     hypothyroidism    Grief reaction     last assessed 1/11/2017    Scabies     last assessed 3/27/2015    Sciatica     last assessed 5/8/2013    Vitamin D deficiency     Wears partial dentures     upper     Social History     Socioeconomic History    Marital status:      Spouse name: Not on file    Number of children: Not on file    Years of education: Not on file    Highest education level: Not on file   Occupational History    Occupation: Teacher     Comment: employed   Tobacco Use    Smoking status: Never    Smokeless tobacco: Never    Tobacco comments:     secondhand smoke exposure   Vaping Use    Vaping status: Never Used   Substance and Sexual Activity    Alcohol use: Yes     Comment: socially    Drug use: No    Sexual activity: Not on file   Other Topics Concern     Not on file   Social History Narrative    Consumes 2-3 sodas per week     Social Determinants of Health     Financial Resource Strain: Low Risk  (6/3/2022)    Received from [facility]    Overall Financial Resource Strain (CARDIA)     Difficulty of Paying Living Expenses: Not very hard   Food Insecurity: No Food Insecurity (10/9/2023)    Received from [facility]    Hunger Vital Sign     Worried About Running Out of Food in the Last Year: Never true     Ran Out of Food in the Last Year: Never true   Transportation Needs: No Transportation Needs (10/9/2023)    Received from [facility]    PRAPARE - Transportation     Lack of Transportation (Medical): No     Lack of Transportation (Non-Medical): No   Physical Activity: Not on file   Stress: No Stress Concern Present (10/9/2023)    Received from [facility]    Citizen of [place] of Occupational Health - Occupational Stress Questionnaire     Feeling of Stress : Only a little   Social Connections: Unknown (10/9/2023)    Received from [facility]    Social Connection and Isolation Panel [NHANES]     Frequency of Communication with Friends and Family: More than three times a week     Frequency of Social Gatherings with Friends and Family: Twice a week     Attends Oriental orthodox Services: More than 4 times per year     Active Member of Clubs or Organizations: No     Attends Club or Organization Meetings: Never     Marital Status: Not on file   Intimate Partner Violence: Not At Risk (10/9/2023)    Received from [facility]    Humiliation, Afraid, Rape, and Kick questionnaire     Fear of Current or Ex-Partner: No     Emotionally Abused: No     Physically Abused: No     Sexually Abused: No   Housing Stability: Low Risk  (10/9/2023)    Received from [facility]    Housing Stability Vital Sign     Unable to Pay for Housing in the Last Year: No     Number of Places  Lived in the Last Year: 1     Unstable Housing in the Last Year: No      Family History   Problem Relation Age of Onset    Coronary artery disease Mother     Diabetes Mother         DM    Stomach cancer Father     Cancer Sister         gallbladder    No Known Problems Daughter     No Known Problems Maternal Grandmother     No Known Problems Maternal Grandfather     No Known Problems Paternal Grandmother     No Known Problems Paternal Grandfather     No Known Problems Sister     No Known Problems Sister     No Known Problems Sister     No Known Problems Sister     No Known Problems Daughter     No Known Problems Son      Past Surgical History:   Procedure Laterality Date    BLADDER SUSPENSION       SECTION      COLONOSCOPY      GA SEPTOPLASTY/SUBMUCOUS RESECJ W/WO CARTILAGE GRF N/A 2019    Procedure: SEPTOPLASTY with Collumellar Strut Reconstruction;  Surgeon: Chuy Muñoz DO;  Location: [facility];  Service: ENT    GA STRTCTC CPTR ASSTD PX EXTRADURAL CRANIAL Left 2019    Procedure: FUNCTIONAL ENDOSCOPIC SINUS SURGERY (FESS) IMAGED GUIDED;  Surgeon: Chuy Muñoz DO;  Location: [facility];  Service: ENT       Current Outpatient Medications:     albuterol (PROVENTIL HFA,VENTOLIN HFA) 90 mcg/act inhaler, TAKE 2 PUFFS BY MOUTH EVERY 6 HOURS AS NEEDED FOR WHEEZE OR FOR SHORTNESS OF BREATH (Patient not taking: Reported on 2023), Disp: 8 g, Rfl: 0    atorvastatin (LIPITOR) 10 mg tablet, Take 1 tablet (10 mg total) by mouth daily (Patient not taking: Reported on 2023), Disp: 30 tablet, Rfl: 2    b complex vitamins capsule, Take 1 capsule by mouth daily, Disp: , Rfl:     brompheniramine-pseudoephedrine-DM 30-2-10 MG/5ML syrup, Take 5 mL by mouth 4 (four) times a day as needed for congestion or cough (Patient not taking: Reported on 2023), Disp: 120 mL, Rfl: 0    buPROPion (WELLBUTRIN XL) 300 mg 24 hr tablet, TAKE 1 TABLET BY MOUTH EVERY DAY IN THE MORNING, Disp: 90 tablet, Rfl: 1     calcium carbonate (OS-GABRIELLE) 600 MG tablet, Take 600 mg by mouth 2 (two) times a day with meals, Disp: , Rfl:     cholecalciferol (VITAMIN D3) 1,000 units tablet, Take 1,000 Units by mouth daily, Disp: , Rfl:     fluticasone (FLONASE) 50 mcg/act nasal spray, 1 spray into each nostril daily (Patient not taking: Reported on 7/17/2023), Disp: 11.1 mL, Rfl: 3    levothyroxine 25 mcg tablet, TAKE 1 TABLET BY MOUTH EVERY DAY, Disp: 90 tablet, Rfl: 0    loratadine (CLARITIN) 10 mg tablet, Take 1 tablet (10 mg total) by mouth daily (Patient not taking: Reported on 7/17/2023), Disp: 30 tablet, Rfl: 3    MAGNESIUM PO, Take 1 tablet by mouth daily, Disp: , Rfl:     naproxen sodium (ALEVE) 220 MG tablet, Take 2 tablets (440 mg total) by mouth every 12 (twelve) hours as needed for mild pain, Disp: , Rfl:     Omega-3 Fatty Acids (FISH OIL) 1,000 mg, Take by mouth, Disp: , Rfl:     phenazopyridine (PYRIDIUM) 100 mg tablet, Take 1 tablet (100 mg total) by mouth 3 (three) times a day as needed for bladder spasms (Patient not taking: Reported on 7/17/2023), Disp: 10 tablet, Rfl: 0    predniSONE 10 mg tablet, Use 5 tablets for 2 days. Use 4 tablets for 2 days. Use 3 tablets for 2 days. Use 2 tablets for 2 days. Use 1 tablet for 2 days. (Patient not taking: Reported on 7/17/2023), Disp: 30 tablet, Rfl: 0    Premarin vaginal cream, APPLY 0.5 APPLICATOR EVERY NIGHT AT BEDTIME FOR 2 WKS AND THEN EVERY OTHER NIGHT AS NEEDED., Disp: , Rfl:     SUMAtriptan (IMITREX) 50 mg tablet, Take 1 tablet (50 mg total) by mouth once as needed for migraine for up to 1 dose May repeat in 1 hour (Patient not taking: Reported on 7/17/2023), Disp: 9 tablet, Rfl: 0    vitamin B-12 (VITAMIN B-12) 1,000 mcg tablet, Take by mouth, Disp: , Rfl:   Allergies   Allergen Reactions    Bactrim [Sulfamethoxazole-Trimethoprim] Hives and Rash    Sulfa Antibiotics Hives and Rash    Pregabalin     Cefuroxime GI Intolerance    Ciprofloxacin Nausea Only    Fiorinal  [Butalbital-Aspirin-Caffeine] Headache     Can take meloxican       Labs:  No visits with results within 6 Month(s) from this visit.   Latest known visit with results is:   Appointment on 01/27/2022   Component Date Value    WBC 01/27/2022 5.71     RBC 01/27/2022 4.90     Hemoglobin 01/27/2022 14.4     Hematocrit 01/27/2022 44.5     MCV 01/27/2022 91     MCH 01/27/2022 29.4     MCHC 01/27/2022 32.4     RDW 01/27/2022 13.1     Platelets 01/27/2022 256     MPV 01/27/2022 8.8 (L)     Sodium 01/27/2022 142     Potassium 01/27/2022 4.1     Chloride 01/27/2022 105     CO2 01/27/2022 30     ANION GAP 01/27/2022 7     BUN 01/27/2022 20     Creatinine 01/27/2022 0.90     Glucose, Fasting 01/27/2022 91     Calcium 01/27/2022 8.5     AST 01/27/2022 26     ALT 01/27/2022 44     Alkaline Phosphatase 01/27/2022 65     Total Protein 01/27/2022 7.3     Albumin 01/27/2022 3.9     Total Bilirubin 01/27/2022 0.54     eGFR 01/27/2022 69     Cholesterol 01/27/2022 255 (H)     Triglycerides 01/27/2022 132     HDL, Direct 01/27/2022 45 (L)     LDL Calculated 01/27/2022 184 (H)     TSH 3RD GENERATON 01/27/2022 2.601      Imaging: No results found.    Review of Systems:  Review of Systems   Constitutional:  Positive for activity change and fatigue. Negative for diaphoresis and fever.   HENT:  Negative for hearing loss, nosebleeds and trouble swallowing.    Eyes:  Negative for visual disturbance.   Respiratory:  Positive for chest tightness. Negative for apnea, cough, choking, shortness of breath, wheezing and stridor.    Cardiovascular:  Positive for chest pain. Negative for palpitations and leg swelling.   Gastrointestinal:  Positive for abdominal pain. Negative for anal bleeding, blood in stool, constipation, diarrhea, nausea and vomiting.   Endocrine: Negative for cold intolerance and heat intolerance.   Genitourinary:  Negative for difficulty urinating and hematuria.   Musculoskeletal:  Positive for arthralgias. Negative for gait  problem and myalgias.   Skin:  Negative for pallor and rash.   Allergic/Immunologic: Negative for immunocompromised state.   Neurological:  Positive for headaches. Negative for dizziness, syncope, speech difficulty and weakness.   Hematological:  Does not bruise/bleed easily.   Psychiatric/Behavioral:  Negative for sleep disturbance. The patient is nervous/anxious.        Physical Exam:  Physical Exam  Vitals reviewed.   Constitutional:       General: She is not in acute distress.     Appearance: Normal appearance. She is normal weight. She is not ill-appearing, toxic-appearing or diaphoretic.   HENT:      Head: Normocephalic.   Eyes:      General: No scleral icterus.     Conjunctiva/sclera: Conjunctivae normal.   Neck:      Vascular: No carotid bruit.   Cardiovascular:      Rate and Rhythm: Normal rate and regular rhythm.      Pulses: Normal pulses.      Heart sounds: Normal heart sounds. No murmur heard.     No friction rub. No gallop.   Pulmonary:      Effort: Pulmonary effort is normal. No respiratory distress.      Breath sounds: Normal breath sounds. No stridor. No wheezing, rhonchi or rales.   Chest:      Chest wall: No tenderness.   Abdominal:      General: Abdomen is flat. Bowel sounds are normal.      Palpations: Abdomen is soft.      Tenderness: There is no abdominal tenderness.   Musculoskeletal:      Right lower leg: No edema.      Left lower leg: No edema.   Skin:     General: Skin is warm and dry.      Capillary Refill: Capillary refill takes less than 2 seconds.      Coloration: Skin is not jaundiced or pale.      Findings: No bruising or erythema.   Neurological:      Mental Status: She is alert and oriented to person, place, and time.   Psychiatric:         Mood and Affect: Mood normal.         Behavior: Behavior normal.         Thought Content: Thought content normal.         Judgment: Judgment normal.         Discussion/Summary: Chest pain syndrome, at rest and exertional.  With positive stress  test for symptoms but no ischemic changes on EKG or echocardiogram.  Recent CTA revealed no obstructive epicardial coronary disease.  Possibility of microvascular angina given her symptoms.  Consideration for catheterization with vasoactive challenge.  We discussed the pros and cons of this approach.  Patient will actually like to proceed with the testing, however patient tells me that she did have a bad experience with her  when he was ill at [facility].  And she is not certain she wants to undergo procedure there.  I left the door open, she will think about it.  I did assure her that she will receive excellent and state-of-the-art care.  The patient will benefit for lipid-lowering therapy, long-term benefits of lipid-lowering therapy specific statins including reduction of myocardial infarction.  Reduction in CVA and reduction of need for revascularization were explained to the patient as well has a long-term safety of these drugs.  Patient is cleared for cholecystectomy from the cardiac point of view.    This note was completed in part utilizing [employer] direct voice recognition software.   Grammatical errors, random word insertion, spelling mistakes, and incomplete sentences may be an occasional consequence of the system secondary to software limitations, ambient noise and hardware issues. At the time of dictation, efforts were made to edit, clarify and /or correct errors.  Please read the chart carefully and recognize, using context, where substitutions have occurred.  If you have any questions or concerns about the context, text or information contained within the body of this dictation, please contact myself, the provider, for further clarification.

## (undated) DEVICE — PATIENT TRACKER 9734887XOM NON-INVASIVE

## (undated) DEVICE — INTENDED FOR TISSUE SEPARATION, AND OTHER PROCEDURES THAT REQUIRE A SHARP SURGICAL BLADE TO PUNCTURE OR CUT.: Brand: BARD-PARKER ® CARBON RIB-BACK BLADES

## (undated) DEVICE — SYRINGE 10ML LL CONTROL TOP

## (undated) DEVICE — SCD SEQUENTIAL COMPRESSION COMFORT SLEEVE MEDIUM KNEE LENGTH: Brand: KENDALL SCD

## (undated) DEVICE — NEEDLE 18 G X 1 1/2

## (undated) DEVICE — NEURO PATTIES 1/2 X 3

## (undated) DEVICE — NEEDLE SPINAL 25G X 3.5 IN QUINCKE

## (undated) DEVICE — SPLINT 1524050 5PK PAIR DOYLE II AIRWAY: Brand: DOYLE II ™

## (undated) DEVICE — SUT ETHILON 2-0 FS 18 IN 664H

## (undated) DEVICE — TUBING SUCTION 5MM X 12 FT

## (undated) DEVICE — INSTRUMENT TRACKER 9733533XOM ENT 1PK

## (undated) DEVICE — SUT MONOCRYL 5-0 P-3 18 IN Y493G

## (undated) DEVICE — TUBING 1895522 5PK STRAIGHTSHOT TO XPS: Brand: STRAIGHTSHOT®

## (undated) DEVICE — GLOVE SRG BIOGEL ECLIPSE 7.5

## (undated) DEVICE — STERILE NASAL PACK: Brand: CARDINAL HEALTH

## (undated) DEVICE — NEEDLE 25G X 1 1/2

## (undated) DEVICE — PENCIL ELECTROSURG E-Z CLEAN -0035H

## (undated) DEVICE — SHEATH 1912000 5PK 4MM/0DEG STORZ XOMED: Brand: ENDO-SCRUB®

## (undated) DEVICE — ELECTRODE BLADE MOD E-Z CLEAN  2.75IN 7CM -0012AM

## (undated) DEVICE — SPECIMEN CONTAINER STERILE PEEL PACK

## (undated) DEVICE — SUT PLAIN 4-0 SC-1 18 IN 1828H

## (undated) DEVICE — BLADE 1884080EM TRICUT 4MMX13CM M4 ROHS: Brand: FUSION®

## (undated) DEVICE — SHEATH 1912010 5PK 4MM/30DEG STORZ XOMED: Brand: ENDO-SCRUB®

## (undated) DEVICE — SUT CHROMIC 4-0 PS-4 18 IN 1643G

## (undated) DEVICE — 2000CC GUARDIAN II: Brand: GUARDIAN

## (undated) DEVICE — SYRINGE 20ML LL

## (undated) DEVICE — SPECIMEN SOCK - SHORT: Brand: MEDI-VAC

## (undated) DEVICE — ANTI-FOG SOLUTION WITH FOAM PAD: Brand: DEVON